# Patient Record
Sex: FEMALE | Race: ASIAN | NOT HISPANIC OR LATINO | Employment: UNEMPLOYED | URBAN - METROPOLITAN AREA
[De-identification: names, ages, dates, MRNs, and addresses within clinical notes are randomized per-mention and may not be internally consistent; named-entity substitution may affect disease eponyms.]

---

## 2019-06-28 ENCOUNTER — TRANSCRIBE ORDERS (OUTPATIENT)
Dept: ADMINISTRATIVE | Age: 29
End: 2019-06-28

## 2019-06-28 ENCOUNTER — APPOINTMENT (OUTPATIENT)
Dept: LAB | Age: 29
End: 2019-06-28
Attending: PREVENTIVE MEDICINE

## 2019-06-28 DIAGNOSIS — Z02.1 PHYSICAL EXAM, PRE-EMPLOYMENT: ICD-10-CM

## 2019-06-28 DIAGNOSIS — Z02.1 PHYSICAL EXAM, PRE-EMPLOYMENT: Primary | ICD-10-CM

## 2019-06-28 PROCEDURE — 36415 COLL VENOUS BLD VENIPUNCTURE: CPT

## 2019-06-28 PROCEDURE — 86787 VARICELLA-ZOSTER ANTIBODY: CPT

## 2019-06-28 PROCEDURE — 86480 TB TEST CELL IMMUN MEASURE: CPT

## 2019-07-01 LAB
GAMMA INTERFERON BACKGROUND BLD IA-ACNC: 0.03 IU/ML
M TB IFN-G BLD-IMP: NEGATIVE
M TB IFN-G CD4+ BCKGRND COR BLD-ACNC: -0.01 IU/ML
M TB IFN-G CD4+ BCKGRND COR BLD-ACNC: 0 IU/ML
MITOGEN IGNF BCKGRD COR BLD-ACNC: >10 IU/ML

## 2019-07-02 LAB — VZV IGG SER IA-ACNC: NORMAL

## 2019-10-24 ENCOUNTER — AMB VIDEO VISIT (OUTPATIENT)
Dept: OTHER | Facility: HOSPITAL | Age: 29
End: 2019-10-24
Payer: COMMERCIAL

## 2019-10-24 PROCEDURE — 99201 PR OFFICE OUTPATIENT NEW 10 MINUTES: CPT | Performed by: FAMILY MEDICINE

## 2019-10-25 NOTE — CARE ANYWHERE EVISITS
Visit Summary for Cesia Funez - Gender: Female - Date of Birth: [id number]  Date: [id number] - Duration: 3 minutes  Patient: Enedelia Padilla Ellakeena  Provider: Sarah Perez    Patient Contact Information  Address  [address]; [place] [address]      Visit Topics  Eyelid inflammation  [Added By: Self - 2019-10-24]    Conversation Transcripts  [0A][0A] [Notification] Naya Dougherty, [employer] Staff, will help you prepare for your visit  She is assisting Sarah Perez Family Physician [0A][Annie Kimmie Badder there, and thank you for connecting  While you are waiting for the doctor, are there any   questions I can answer? If you have questions about billing, insurance or technical issues, please contact customer service  [0A][Notification] Naya Dougherty has left the room  [0A][Notification] Naya Dougherty has left the room  [0A][Notification] You are   connected with Sarah Perez Family Physician [0A][Notification] Cesia Funez is located in [address] [place] [address]  [0A][Notification] Cesia Funez has shared health history  Art Alanis  [0A][Notification] Sarah Perez has added a diagnosis/procedure code (see the   "Visit Notes" tab)  [0A][Notification] Sarah Perez has added a prescription (see the "Visit Notes" tab)  [0A]    Diagnosis  Rash and other nonspecific skin eruption    Procedures    Medications Prescribed    prednisone  Dose : 2 tablets  Route : oral  Frequency : every day  Refills : 0  Instructions to the Pharmacist : Substitutions allowed      Provider Notes  [0A][0A] [0A]We strongly encourage you to share the following record of today's visit with your primary care physician  [0A][0A][0A][0A]Contact phone number[0A][0A]Mode of communication: [place]: For more than a day patient has had irritated   bilateral eyelid with dryness of lids crusting of lashes in the morning, and redness has been going on for 1 year  No visual disturbance, no pain in eye, itching or burning, no trauma or exposure to flying debris   No c/o fever or other URI symptom  Pt   does not wear contacts  [0A][0A][0A][0A]PMH:  None[0A][0A]PSH:  None[0A][0A]Meds:  Sulfa wash[0A][0A]Allergies: NKDA[0A][0A][0A][0A]PE: [0A][0A]Gen: Well appearing, NAD[0A][0A]Eyes: Visually there is no injection of bulbar or palpebral conjunctiva  bilateral upper and lower eyelid redness and dry skin Extraocular movements are intact  There is no discharge noted  [0A][0A][0A][0A]Assessment:  dermatitis eyelids[0A][0A][0A][0A]Plan: [0A][0A]1  Discussed treatment options  I am sending you a   prescription as described below  [0A]PRednisone 20mg 2 tabs daily for 5 days   [0A]2  Discussed precautions including soap and water hand washing   [0A][0A]Follow up:[0A][0A]1  If there are any questions or problems with the prescription, call   [phone] anytime for assistance  [0A][0A]2  Please re-connect for another online visit or see an in-person provider should symptoms worsen or persist for more than 2-3 days  [0A][0A]3  Please print a copy of this note and send it to your regular   doctor, or take it to your next visit so it may be included in your medical record   [0A][0A][0A][0A]Patient voiced understanding and agreement with plan [0A][0A]Please see your PCP on an annual basis   [0A]    Electronically signed by: Naresh Don(NPI [id number])

## 2020-08-03 ENCOUNTER — CLINICAL SUPPORT (OUTPATIENT)
Dept: FAMILY MEDICINE CLINIC | Facility: CLINIC | Age: 30
End: 2020-08-03
Payer: COMMERCIAL

## 2020-08-03 DIAGNOSIS — Z11.1 SCREENING-PULMONARY TB: Primary | ICD-10-CM

## 2020-08-03 PROCEDURE — 86580 TB INTRADERMAL TEST: CPT

## 2020-08-05 LAB
INDURATION: NORMAL MM
TB SKIN TEST: NEGATIVE

## 2020-10-29 ENCOUNTER — TELEMEDICINE (OUTPATIENT)
Dept: FAMILY MEDICINE CLINIC | Facility: CLINIC | Age: 30
End: 2020-10-29
Payer: COMMERCIAL

## 2020-10-29 DIAGNOSIS — E53.8 B12 DEFICIENCY: ICD-10-CM

## 2020-10-29 DIAGNOSIS — R01.1 MURMUR, CARDIAC: ICD-10-CM

## 2020-10-29 DIAGNOSIS — R76.8 POSITIVE ANA (ANTINUCLEAR ANTIBODY): Primary | ICD-10-CM

## 2020-10-29 DIAGNOSIS — R00.2 PALPITATION: ICD-10-CM

## 2020-10-29 DIAGNOSIS — E55.9 VITAMIN D DEFICIENCY: ICD-10-CM

## 2020-10-29 DIAGNOSIS — E61.1 IRON DEFICIENCY: ICD-10-CM

## 2020-10-29 PROCEDURE — 99213 OFFICE O/P EST LOW 20 MIN: CPT | Performed by: FAMILY MEDICINE

## 2020-11-02 ENCOUNTER — TELEPHONE (OUTPATIENT)
Dept: OBGYN CLINIC | Facility: CLINIC | Age: 30
End: 2020-11-02

## 2020-11-03 ENCOUNTER — OFFICE VISIT (OUTPATIENT)
Dept: OBGYN CLINIC | Facility: CLINIC | Age: 30
End: 2020-11-03
Payer: COMMERCIAL

## 2020-11-03 VITALS
DIASTOLIC BLOOD PRESSURE: 72 MMHG | TEMPERATURE: 98.3 F | HEIGHT: 63 IN | WEIGHT: 138 LBS | SYSTOLIC BLOOD PRESSURE: 122 MMHG | BODY MASS INDEX: 24.45 KG/M2

## 2020-11-03 DIAGNOSIS — Z12.4 SCREENING FOR MALIGNANT NEOPLASM OF THE CERVIX: ICD-10-CM

## 2020-11-03 DIAGNOSIS — N91.2 AMENORRHEA: ICD-10-CM

## 2020-11-03 DIAGNOSIS — Z01.419 WOMEN'S ANNUAL ROUTINE GYNECOLOGICAL EXAMINATION: Primary | ICD-10-CM

## 2020-11-03 LAB — SL AMB POCT URINE HCG: POSITIVE

## 2020-11-03 PROCEDURE — 81025 URINE PREGNANCY TEST: CPT | Performed by: OBSTETRICS & GYNECOLOGY

## 2020-11-03 PROCEDURE — G0145 SCR C/V CYTO,THINLAYER,RESCR: HCPCS | Performed by: OBSTETRICS & GYNECOLOGY

## 2020-11-03 PROCEDURE — 87624 HPV HI-RISK TYP POOLED RSLT: CPT | Performed by: OBSTETRICS & GYNECOLOGY

## 2020-11-03 PROCEDURE — 3008F BODY MASS INDEX DOCD: CPT | Performed by: FAMILY MEDICINE

## 2020-11-03 PROCEDURE — S0610 ANNUAL GYNECOLOGICAL EXAMINA: HCPCS | Performed by: OBSTETRICS & GYNECOLOGY

## 2020-11-03 RX ORDER — MULTIVIT-MIN/IRON FUM/FOLIC AC 7.5 MG-4
1 TABLET ORAL DAILY
COMMUNITY
End: 2022-03-09 | Stop reason: ALTCHOICE

## 2020-11-03 RX ORDER — CETIRIZINE HYDROCHLORIDE 10 MG/1
10 TABLET ORAL DAILY
COMMUNITY
End: 2022-03-09 | Stop reason: ALTCHOICE

## 2020-11-03 RX ORDER — CHLORAL HYDRATE 500 MG
1000 CAPSULE ORAL DAILY
COMMUNITY
End: 2022-03-09 | Stop reason: ALTCHOICE

## 2020-11-03 RX ORDER — VALACYCLOVIR HYDROCHLORIDE 500 MG/1
500 TABLET, FILM COATED ORAL AS NEEDED
COMMUNITY
End: 2022-08-03 | Stop reason: SDUPTHER

## 2020-11-04 LAB
HPV HR 12 DNA CVX QL NAA+PROBE: NEGATIVE
HPV16 DNA CVX QL NAA+PROBE: NEGATIVE
HPV18 DNA CVX QL NAA+PROBE: NEGATIVE

## 2020-11-05 ENCOUNTER — TELEPHONE (OUTPATIENT)
Dept: OBGYN CLINIC | Facility: CLINIC | Age: 30
End: 2020-11-05

## 2020-11-05 ENCOUNTER — LAB (OUTPATIENT)
Dept: LAB | Facility: CLINIC | Age: 30
End: 2020-11-05
Payer: COMMERCIAL

## 2020-11-05 DIAGNOSIS — E61.1 IRON DEFICIENCY: ICD-10-CM

## 2020-11-05 DIAGNOSIS — R00.2 PALPITATION: ICD-10-CM

## 2020-11-05 DIAGNOSIS — R76.8 POSITIVE ANA (ANTINUCLEAR ANTIBODY): ICD-10-CM

## 2020-11-05 DIAGNOSIS — N91.2 AMENORRHEA: Primary | ICD-10-CM

## 2020-11-05 DIAGNOSIS — R01.1 MURMUR, CARDIAC: ICD-10-CM

## 2020-11-05 DIAGNOSIS — E55.9 VITAMIN D DEFICIENCY: ICD-10-CM

## 2020-11-05 DIAGNOSIS — E53.8 B12 DEFICIENCY: ICD-10-CM

## 2020-11-05 DIAGNOSIS — Z34.81 ENCOUNTER FOR SUPERVISION OF OTHER NORMAL PREGNANCY, FIRST TRIMESTER: Primary | ICD-10-CM

## 2020-11-05 LAB
25(OH)D3 SERPL-MCNC: 21.5 NG/ML (ref 30–100)
ANION GAP SERPL CALCULATED.3IONS-SCNC: 11 MMOL/L (ref 4–13)
BUN SERPL-MCNC: 9 MG/DL (ref 5–25)
CALCIUM SERPL-MCNC: 9 MG/DL (ref 8.3–10.1)
CHLORIDE SERPL-SCNC: 99 MMOL/L (ref 100–108)
CO2 SERPL-SCNC: 24 MMOL/L (ref 21–32)
CREAT SERPL-MCNC: 0.6 MG/DL (ref 0.6–1.3)
FERRITIN SERPL-MCNC: 15 NG/ML (ref 8–388)
GFR SERPL CREATININE-BSD FRML MDRD: 121 ML/MIN/1.73SQ M
GLUCOSE P FAST SERPL-MCNC: 83 MG/DL (ref 65–99)
MAGNESIUM SERPL-MCNC: 1.9 MG/DL (ref 1.6–2.6)
POTASSIUM SERPL-SCNC: 3.5 MMOL/L (ref 3.5–5.3)
SODIUM SERPL-SCNC: 134 MMOL/L (ref 136–145)
VIT B12 SERPL-MCNC: 458 PG/ML (ref 100–900)

## 2020-11-05 PROCEDURE — 82306 VITAMIN D 25 HYDROXY: CPT

## 2020-11-05 PROCEDURE — 86039 ANTINUCLEAR ANTIBODIES (ANA): CPT

## 2020-11-05 PROCEDURE — 83735 ASSAY OF MAGNESIUM: CPT

## 2020-11-05 PROCEDURE — 82728 ASSAY OF FERRITIN: CPT

## 2020-11-05 PROCEDURE — 80048 BASIC METABOLIC PNL TOTAL CA: CPT

## 2020-11-05 PROCEDURE — 82607 VITAMIN B-12: CPT

## 2020-11-05 PROCEDURE — 36415 COLL VENOUS BLD VENIPUNCTURE: CPT

## 2020-11-05 PROCEDURE — 86038 ANTINUCLEAR ANTIBODIES: CPT

## 2020-11-05 RX ORDER — PNV NO.95/FERROUS FUM/FOLIC AC 28MG-0.8MG
1 TABLET ORAL DAILY
Qty: 30 TABLET | Refills: 12 | Status: CANCELLED | OUTPATIENT
Start: 2020-11-05

## 2020-11-05 RX ORDER — PNV NO.95/FERROUS FUM/FOLIC AC 28MG-0.8MG
1 TABLET ORAL DAILY
Qty: 100 TABLET | Refills: 3 | Status: SHIPPED | OUTPATIENT
Start: 2020-11-05 | End: 2021-11-01 | Stop reason: SDUPTHER

## 2020-11-06 ENCOUNTER — TELEPHONE (OUTPATIENT)
Dept: OBGYN CLINIC | Facility: CLINIC | Age: 30
End: 2020-11-06

## 2020-11-06 DIAGNOSIS — N91.2 AMENORRHEA: ICD-10-CM

## 2020-11-06 LAB
ANA HOMOGEN SER QL IF: NORMAL
ANA HOMOGEN TITR SER: NORMAL {TITER}
RYE IGE QN: POSITIVE

## 2020-11-09 ENCOUNTER — TELEPHONE (OUTPATIENT)
Dept: OBGYN CLINIC | Facility: CLINIC | Age: 30
End: 2020-11-09

## 2020-11-09 LAB
LAB AP GYN PRIMARY INTERPRETATION: NORMAL
LAB AP LMP: NORMAL
Lab: NORMAL

## 2020-11-10 RX ORDER — PNV NO.95/FERROUS FUM/FOLIC AC 28MG-0.8MG
1 TABLET ORAL DAILY
Qty: 100 TABLET | Refills: 3 | Status: CANCELLED | OUTPATIENT
Start: 2020-11-10 | End: 2021-02-18

## 2020-11-10 RX ORDER — PRENATAL VIT 49/IRON FUM/FOLIC 6.75-0.2MG
TABLET ORAL
Status: CANCELLED | OUTPATIENT
Start: 2020-11-10

## 2020-11-17 ENCOUNTER — TELEPHONE (OUTPATIENT)
Dept: OBGYN CLINIC | Facility: CLINIC | Age: 30
End: 2020-11-17

## 2020-11-17 DIAGNOSIS — O21.9 NAUSEA/VOMITING IN PREGNANCY: Primary | ICD-10-CM

## 2020-11-17 RX ORDER — DOXYLAMINE SUCCINATE AND PYRIDOXINE HYDROCHLORIDE, DELAYED RELEASE TABLETS 10 MG/10 MG 10; 10 MG/1; MG/1
2 TABLET, DELAYED RELEASE ORAL
Qty: 100 TABLET | Refills: 1 | Status: SHIPPED | OUTPATIENT
Start: 2020-11-17 | End: 2021-01-04 | Stop reason: ALTCHOICE

## 2020-11-18 ENCOUNTER — TELEPHONE (OUTPATIENT)
Dept: OBGYN CLINIC | Facility: CLINIC | Age: 30
End: 2020-11-18

## 2020-11-18 DIAGNOSIS — O21.9 NAUSEA/VOMITING IN PREGNANCY: Primary | ICD-10-CM

## 2020-11-18 RX ORDER — METOCLOPRAMIDE 10 MG/1
10 TABLET ORAL 3 TIMES DAILY
Qty: 30 TABLET | Refills: 1 | Status: SHIPPED | OUTPATIENT
Start: 2020-11-18 | End: 2020-12-10

## 2020-11-19 ENCOUNTER — HOSPITAL ENCOUNTER (OUTPATIENT)
Dept: ULTRASOUND IMAGING | Facility: HOSPITAL | Age: 30
Discharge: HOME | End: 2020-11-19
Attending: OBSTETRICS & GYNECOLOGY
Payer: COMMERCIAL

## 2020-11-19 DIAGNOSIS — N91.2 AMENORRHEA: ICD-10-CM

## 2020-11-19 PROCEDURE — 76801 OB US < 14 WKS SINGLE FETUS: CPT

## 2020-11-23 ENCOUNTER — TELEPHONE (OUTPATIENT)
Dept: OBGYN CLINIC | Facility: CLINIC | Age: 30
End: 2020-11-23

## 2020-11-23 DIAGNOSIS — O20.0 THREATENED ABORTION: Primary | ICD-10-CM

## 2020-11-25 ENCOUNTER — LAB (OUTPATIENT)
Dept: LAB | Facility: CLINIC | Age: 30
End: 2020-11-25
Payer: COMMERCIAL

## 2020-11-25 DIAGNOSIS — O20.0 THREATENED ABORTION: ICD-10-CM

## 2020-11-25 LAB
B-HCG SERPL-ACNC: ABNORMAL MIU/ML
PROGEST SERPL-MCNC: 16.2 NG/ML

## 2020-11-25 PROCEDURE — 84144 ASSAY OF PROGESTERONE: CPT

## 2020-11-25 PROCEDURE — 36415 COLL VENOUS BLD VENIPUNCTURE: CPT

## 2020-11-25 PROCEDURE — 84702 CHORIONIC GONADOTROPIN TEST: CPT

## 2020-12-10 DIAGNOSIS — O21.9 NAUSEA/VOMITING IN PREGNANCY: ICD-10-CM

## 2020-12-10 RX ORDER — METOCLOPRAMIDE 10 MG/1
TABLET ORAL
Qty: 30 TABLET | Refills: 1 | Status: SHIPPED | OUTPATIENT
Start: 2020-12-10 | End: 2020-12-11 | Stop reason: SDUPTHER

## 2020-12-11 DIAGNOSIS — O21.9 NAUSEA/VOMITING IN PREGNANCY: ICD-10-CM

## 2020-12-12 RX ORDER — METOCLOPRAMIDE 10 MG/1
5 TABLET ORAL 3 TIMES DAILY
Qty: 30 TABLET | Refills: 1 | Status: SHIPPED | OUTPATIENT
Start: 2020-12-12 | End: 2020-12-27

## 2020-12-16 ENCOUNTER — TELEPHONE (OUTPATIENT)
Dept: OBGYN CLINIC | Facility: CLINIC | Age: 30
End: 2020-12-16

## 2020-12-16 ENCOUNTER — HOSPITAL ENCOUNTER (OUTPATIENT)
Dept: ULTRASOUND IMAGING | Facility: HOSPITAL | Age: 30
Discharge: HOME | End: 2020-12-16
Attending: OBSTETRICS & GYNECOLOGY
Payer: COMMERCIAL

## 2020-12-16 ENCOUNTER — DOCUMENTATION (OUTPATIENT)
Dept: OTHER | Facility: HOSPITAL | Age: 30
End: 2020-12-16

## 2020-12-16 DIAGNOSIS — O20.0 THREATENED ABORTION: ICD-10-CM

## 2020-12-16 PROCEDURE — 76817 TRANSVAGINAL US OBSTETRIC: CPT

## 2020-12-16 PROCEDURE — 76816 OB US FOLLOW-UP PER FETUS: CPT

## 2020-12-16 NOTE — TELEPHONE ENCOUNTER
Patient called and is wondering if you still want her to go get blood work done for her blood type even though she does not want to get a d & c done

## 2020-12-16 NOTE — TELEPHONE ENCOUNTER
Spoke with the patient results discussed option given for D&C versus repeat ultrasound in 10 days if still confirm nonviable pregnancy then to return to office to discuss option possible medical or surgical management for her missed AB to have an ultrasound repeated December 28 to confirm status of the pregnancy and to be seen at 1:00 p m   To discuss management option all patient questions answer

## 2020-12-16 NOTE — TELEPHONE ENCOUNTER
Patient called stating that she reviewed her ultrasound and noticed that she does not have a viable pregnancy  She stated it has been around 10 weeks since she had her period, and was not sure what she needs to do  Asked to be called back to discuss

## 2020-12-17 ENCOUNTER — NURSE TRIAGE (OUTPATIENT)
Dept: OTHER | Facility: OTHER | Age: 30
End: 2020-12-17

## 2020-12-18 ENCOUNTER — APPOINTMENT (OUTPATIENT)
Dept: LAB | Facility: CLINIC | Age: 30
End: 2020-12-18
Payer: COMMERCIAL

## 2020-12-18 DIAGNOSIS — O03.9 MISCARRIAGE: Primary | ICD-10-CM

## 2020-12-18 DIAGNOSIS — O03.9 MISCARRIAGE: ICD-10-CM

## 2020-12-18 LAB
ABO GROUP BLD: NORMAL
B-HCG SERPL-ACNC: 9159 MIU/ML
BLD GP AB SCN SERPL QL: NEGATIVE
RH BLD: POSITIVE
SPECIMEN EXPIRATION DATE: NORMAL

## 2020-12-18 PROCEDURE — 86850 RBC ANTIBODY SCREEN: CPT

## 2020-12-18 PROCEDURE — 84702 CHORIONIC GONADOTROPIN TEST: CPT

## 2020-12-18 PROCEDURE — 86901 BLOOD TYPING SEROLOGIC RH(D): CPT

## 2020-12-18 PROCEDURE — 36415 COLL VENOUS BLD VENIPUNCTURE: CPT

## 2020-12-18 PROCEDURE — 86900 BLOOD TYPING SEROLOGIC ABO: CPT

## 2020-12-18 NOTE — TELEPHONE ENCOUNTER
Spoke with pt and order put in for type and screen  She will try and go today to have done  She states she is only having spotting at this time  Advised to call if any further question or concerns

## 2020-12-21 ENCOUNTER — OFFICE VISIT (OUTPATIENT)
Dept: OBGYN CLINIC | Facility: CLINIC | Age: 30
End: 2020-12-21
Payer: COMMERCIAL

## 2020-12-21 VITALS
WEIGHT: 138.4 LBS | BODY MASS INDEX: 24.52 KG/M2 | SYSTOLIC BLOOD PRESSURE: 120 MMHG | DIASTOLIC BLOOD PRESSURE: 76 MMHG | HEIGHT: 63 IN

## 2020-12-21 DIAGNOSIS — O03.4 INCOMPLETE MISCARRIAGE: Primary | ICD-10-CM

## 2020-12-21 PROCEDURE — 3008F BODY MASS INDEX DOCD: CPT | Performed by: OBSTETRICS & GYNECOLOGY

## 2020-12-21 PROCEDURE — 1036F TOBACCO NON-USER: CPT | Performed by: OBSTETRICS & GYNECOLOGY

## 2020-12-21 PROCEDURE — 99213 OFFICE O/P EST LOW 20 MIN: CPT | Performed by: OBSTETRICS & GYNECOLOGY

## 2020-12-21 RX ORDER — MISOPROSTOL 200 UG/1
TABLET ORAL
Qty: 4 TABLET | Refills: 0 | Status: SHIPPED | OUTPATIENT
Start: 2020-12-21 | End: 2021-01-04 | Stop reason: ALTCHOICE

## 2020-12-30 ENCOUNTER — TRANSCRIBE ORDERS (OUTPATIENT)
Dept: LAB | Facility: CLINIC | Age: 30
End: 2020-12-30

## 2020-12-30 ENCOUNTER — HOSPITAL ENCOUNTER (OUTPATIENT)
Dept: NON INVASIVE DIAGNOSTICS | Facility: CLINIC | Age: 30
Discharge: HOME | End: 2020-12-30
Payer: COMMERCIAL

## 2020-12-30 DIAGNOSIS — R01.1 MURMUR, CARDIAC: ICD-10-CM

## 2020-12-30 DIAGNOSIS — O03.4 INCOMPLETE MISCARRIAGE: Primary | ICD-10-CM

## 2020-12-30 PROCEDURE — 93306 TTE W/DOPPLER COMPLETE: CPT

## 2020-12-30 PROCEDURE — 93306 TTE W/DOPPLER COMPLETE: CPT | Performed by: INTERNAL MEDICINE

## 2021-01-04 ENCOUNTER — OFFICE VISIT (OUTPATIENT)
Dept: OBGYN CLINIC | Facility: CLINIC | Age: 31
End: 2021-01-04
Payer: COMMERCIAL

## 2021-01-04 VITALS
WEIGHT: 142 LBS | DIASTOLIC BLOOD PRESSURE: 76 MMHG | BODY MASS INDEX: 25.16 KG/M2 | TEMPERATURE: 97.9 F | SYSTOLIC BLOOD PRESSURE: 122 MMHG | HEIGHT: 63 IN

## 2021-01-04 DIAGNOSIS — O03.9 COMPLETE MISCARRIAGE: Primary | ICD-10-CM

## 2021-01-04 PROCEDURE — 99213 OFFICE O/P EST LOW 20 MIN: CPT | Performed by: OBSTETRICS & GYNECOLOGY

## 2021-01-04 RX ORDER — PYRIDOXINE HCL (VITAMIN B6) 50 MG
50 TABLET ORAL DAILY
COMMUNITY
End: 2022-03-09 | Stop reason: ALTCHOICE

## 2021-01-04 NOTE — PROGRESS NOTES
Assessment/Plan:     Diagnoses and all orders for this visit:    Complete miscarriage    Other orders  -     VITAMIN D PO; Take by mouth  -     vitamin B-12 (CYANOCOBALAMIN) 100 MCG TABS; Take 50 mcg by mouth daily  -     Iron-Vitamin C (IRON 100/C PO); Take by mouth       57-year-old female  complete miscarriage  Acne/eczema  Vegetarian  Blood type  positive  Plan  Desired natural planning for contraception  To continue prenatal vitamin daily  Return to office for annual exam    Subjective:      Patient ID: Todd Castro is a 26 y o  female      HPI  57-year-old female presents to the office today follow-up on miscarriage  Patient was seen December 21 given misoprostol desire medical miscarriage patient has spontaneous miscarriage and passed on her own product of conception December 22nd  Started to have heavy bleeding passing clots then her bleeding slowed down denies any active bleeding currently denies any pelvic pain denies any nausea vomiting diarrhea or constipation denies any urgency frequency or dysuria blood type is positive  Bedside ultrasound performed confirm passing products of conception  Speculum apply cervix closed long posterior no active bleeding noted  Option given for contraception different contraception option explained and discussed with patient in details with risk benefit side effect patient is considering possible pregnancy in the next 3 months consider natural planning for contraception and to continue prenatal vitamin daily all questions answered and patient was satisfied  The following portions of the patient's history were reviewed and updated as appropriate: allergies, current medications, past family history, past medical history, past social history, past surgical history and problem list     Review of Systems      Objective:      /76 (BP Location: Left arm, Patient Position: Sitting, Cuff Size: Adult)   Temp 97 9 °F (36 6 °C) (Temporal)   Ht 5' 3" (1 6 m)   Wt 64 4 kg (142 lb)   BMI 25 15 kg/m²          Physical Exam  Vitals signs and nursing note reviewed  Constitutional:       Appearance: Normal appearance  She is well-developed  Abdominal:      Palpations: Abdomen is soft  Hernia: No hernia is present  Genitourinary:     Vagina: Normal  No vaginal discharge, erythema, tenderness or bleeding  Cervix: No cervical motion tenderness, discharge or friability  Uterus: Not enlarged and not tender  Adnexa:         Right: No mass or tenderness  Left: No mass or tenderness  Neurological:      Mental Status: She is alert and oriented to person, place, and time

## 2021-01-25 ENCOUNTER — OFFICE VISIT (OUTPATIENT)
Dept: FAMILY MEDICINE CLINIC | Facility: CLINIC | Age: 31
End: 2021-01-25
Payer: COMMERCIAL

## 2021-01-25 ENCOUNTER — OFFICE VISIT (OUTPATIENT)
Dept: DERMATOLOGY | Facility: CLINIC | Age: 31
End: 2021-01-25
Payer: COMMERCIAL

## 2021-01-25 VITALS
TEMPERATURE: 98.8 F | HEART RATE: 82 BPM | WEIGHT: 143 LBS | OXYGEN SATURATION: 99 % | BODY MASS INDEX: 25.34 KG/M2 | DIASTOLIC BLOOD PRESSURE: 82 MMHG | RESPIRATION RATE: 16 BRPM | SYSTOLIC BLOOD PRESSURE: 126 MMHG | HEIGHT: 63 IN

## 2021-01-25 VITALS
SYSTOLIC BLOOD PRESSURE: 112 MMHG | TEMPERATURE: 96.8 F | WEIGHT: 141.9 LBS | DIASTOLIC BLOOD PRESSURE: 80 MMHG | BODY MASS INDEX: 25.14 KG/M2 | HEIGHT: 63 IN

## 2021-01-25 DIAGNOSIS — L70.0 ACNE CYSTICA: Primary | ICD-10-CM

## 2021-01-25 DIAGNOSIS — R35.0 URINARY FREQUENCY: Primary | ICD-10-CM

## 2021-01-25 LAB
SL AMB  POCT GLUCOSE, UA: NORMAL
SL AMB LEUKOCYTE ESTERASE,UA: NORMAL
SL AMB POCT BILIRUBIN,UA: NORMAL
SL AMB POCT BLOOD,UA: NORMAL
SL AMB POCT CLARITY,UA: CLEAR
SL AMB POCT COLOR,UA: YELLOW
SL AMB POCT KETONES,UA: NORMAL
SL AMB POCT NITRITE,UA: NORMAL
SL AMB POCT PH,UA: 8
SL AMB POCT SPECIFIC GRAVITY,UA: 1.01
SL AMB POCT URINE HCG: NEGATIVE
SL AMB POCT URINE PROTEIN: NORMAL
SL AMB POCT UROBILINOGEN: NORMAL

## 2021-01-25 PROCEDURE — 81025 URINE PREGNANCY TEST: CPT | Performed by: DERMATOLOGY

## 2021-01-25 PROCEDURE — 3008F BODY MASS INDEX DOCD: CPT | Performed by: NURSE PRACTITIONER

## 2021-01-25 PROCEDURE — 81003 URINALYSIS AUTO W/O SCOPE: CPT | Performed by: NURSE PRACTITIONER

## 2021-01-25 PROCEDURE — 1036F TOBACCO NON-USER: CPT | Performed by: NURSE PRACTITIONER

## 2021-01-25 PROCEDURE — 99204 OFFICE O/P NEW MOD 45 MIN: CPT | Performed by: DERMATOLOGY

## 2021-01-25 PROCEDURE — 99213 OFFICE O/P EST LOW 20 MIN: CPT | Performed by: NURSE PRACTITIONER

## 2021-01-25 RX ORDER — METOCLOPRAMIDE 10 MG/1
TABLET ORAL
COMMUNITY
Start: 2020-11-18 | End: 2022-01-17 | Stop reason: SDUPTHER

## 2021-01-25 RX ORDER — SPIRONOLACTONE 25 MG/1
25 TABLET ORAL DAILY
Qty: 30 TABLET | Refills: 2 | Status: SHIPPED | OUTPATIENT
Start: 2021-01-25 | End: 2022-02-18

## 2021-01-25 NOTE — PROGRESS NOTES
Assessment/Plan:      Diagnoses and all orders for this visit:    Urinary frequency  -     POCT urine dip  -     TSH, 3rd generation with Free T4 reflex; Future      Patient reports increased urinary frequency and urgency for the past 4 days  Denies any fever, dysuria, hematuria, pelvic pain, or lower back pain  Urinalysis done and reviewed in the office and was normal    Patient had a urine HCG done this am at the dermatologist and it was negative  Dermatologist ordered a CBC and CMP  TSH ordered  Will follow-up results with the patient  Patient instructed to follow-up sooner if symptoms get worse or do not get better  Subjective:     Patient ID: Watson Guo is a 33 y o  female  Patient reports increased urinary frequency and urgency for the past 4 days  Denies any fever, dysuria, hematuria, pelvic pain, or lower back pain  Patient denies taking anything OTC for her symptoms  Patient reports that her symptoms are not going away  Review of Systems   Constitutional: Negative for chills and fever  HENT: Negative for congestion, ear pain and sore throat  Respiratory: Negative for cough, chest tightness and shortness of breath  Cardiovascular: Negative for chest pain  Gastrointestinal: Negative for abdominal pain, diarrhea, nausea and vomiting  Genitourinary:        As noted in HPI  Skin: Negative for rash  Neurological: Negative for dizziness, syncope, light-headedness and headaches  Objective:     Physical Exam  Vitals signs reviewed  Constitutional:       General: She is not in acute distress  Appearance: She is not diaphoretic  Cardiovascular:      Rate and Rhythm: Normal rate and regular rhythm  Pulses: Normal pulses  Heart sounds: Normal heart sounds  Pulmonary:      Effort: Pulmonary effort is normal  No respiratory distress  Breath sounds: Normal breath sounds  No wheezing  Abdominal:      General: There is no distension  Palpations: Abdomen is soft  There is no mass  Tenderness: There is no abdominal tenderness  Comments: No flank tenderness noted on palpation bilaterally  Musculoskeletal:      Comments: Gait wnl  Skin:     Findings: No rash  Neurological:      Mental Status: She is alert and oriented to person, place, and time     Psychiatric:         Mood and Affect: Mood normal

## 2021-01-25 NOTE — PROGRESS NOTES
[facility] Note     Patient Name: Verner Leech  Encounter Date: 1/25/2021     Have you been cared for by a [facility] Dermatologist in the last 3 years and, if so, which one? No    · Have you traveled outside of the [address] [place] [address] in the past 3 months or outside of the [facility] area in the last 2 weeks? No     May we call your Preferred Phone number to discuss your specific medical information? Yes     May we leave a detailed message that includes your specific medical information? Yes      Today's Chief Concerns:   Concern #1: Acne    Past Medical History:  Have you personally ever had or currently have any of the following? · Skin cancer (such as Melanoma, Basal Cell Carcinoma, Squamous Cell Carcinoma? (If Yes, please provide more detail)- No  · Eczema: YES  · Psoriasis: No  · HIV/AIDS: No  · Hepatitis B or C: No  · Tuberculosis: No  · Systemic Immunosuppression such as Diabetes, Biologic or Immunotherapy, Chemotherapy, Organ Transplantation, Bone Marrow Transplantation (If YES, please provide more detail): No  · Radiation Treatment (If YES, please provide more detail): No  · Any other major medical conditions/concerns? (If Yes, which types)- No    Social History:     What is/was your primary occupation? student     What are your hobbies/past-times? denies    Family History:  Have any of your "first degree relatives" (parent, brother, sister, or child) had any of the following       · Skin cancer such as Melanoma or Merkel Cell Carcinoma or Pancreatic Cancer? No  · Eczema, Asthma, Hay Fever or Seasonal Allergies: YES, eczema: father, allergies: sister  · Psoriasis or Psoriatic Arthritis: No  · Do any other medical conditions seem to run in your family? If Yes, what condition and which relatives?   YES, hypertension: father    Current Medications:       Current Outpatient Medications:     cetirizine (ZyrTEC) 10 mg tablet, Take 10 mg by mouth daily, Disp: , Rfl:     Iron-Vitamin C (IRON 100/C PO), Take by mouth, Disp: , Rfl:     Multiple Vitamins-Minerals (multivitamin with minerals) tablet, Take 1 tablet by mouth daily, Disp: , Rfl:     Omega-3 Fatty Acids (fish oil) 1,000 mg, Take 1,000 mg by mouth daily, Disp: , Rfl:     Prenatal Vit-Fe Fumarate-FA (prenatal vitamin) 28-0 8 mg, Take 1 tablet by mouth daily, Disp: 100 tablet, Rfl: 3    valACYclovir (VALTREX) 500 mg tablet, Take 500 mg by mouth 2 (two) times a day, Disp: , Rfl:     vitamin B-12 (CYANOCOBALAMIN) 100 MCG TABS, Take 50 mcg by mouth daily, Disp: , Rfl:     VITAMIN D PO, Take by mouth, Disp: , Rfl:       Review of Systems:  Have you recently had or currently have any of the following? If YES, what are you doing for the problem? · Fever, chills or unintended weight loss: No  · Sudden loss or change in your vision: No  · Nausea, vomiting or blood in your stool: No  · Painful or swollen joints: No  · Wheezing or cough: No  · Changing mole or non-healing wound: No  · Nosebleeds: No  · Excessive sweating: No  · Easy or prolonged bleeding? No  · Over the last 2 weeks, how often have you been bothered by the following problems? · Taking little interest or pleasure in doing things: 1 - Not at All  · Feeling down, depressed, or hopeless: 1 - Not at All  · Rapid heartbeat with epinephrine:  No    · FEMALES ONLY:    · Are you pregnant or planning to become pregnant? No  · Are you currently or planning to be nursing or breast feeding? No    · Any known allergies? · No Known Allergies      Physical Exam:     Was a chaperone (Derm Clinical Assistant) present throughout the entire Physical Exam? Yes     Did the Dermatology Team specifically  the patient on the importance of a Full Skin Exam to be sure that nothing is missed clinically?  Yes}  o Did the patient ultimately request or accept a Full Skin Exam?  NO  o Did the patient specifically refuse to have the areas "under-the-bra" examined by the Dermatologist? Charlene galan Did the patient specifically refuse to have the areas "under-the-underwear" examined by the Dermatologist? YES    CONSTITUTIONAL:   Vitals:    01/25/21 0930   Temp: (!) 96 8 °F (36 °C)   TempSrc: Temporal   Weight: 64 4 kg (141 lb 14 4 oz)   Height: 5' 3" (1 6 m)       PSYCH: Normal mood and affect  EYES: Normal conjunctiva  ENT: Normal lips and oral mucosa  CARDIOVASCULAR: No edema  RESPIRATORY: Normal respirations  HEME/LYMPH/IMMUNO:  No regional lymphadenopathy except as noted below in "ASSESSMENT AND PLAN BY DIAGNOSIS"    SKIN:  FULL ORGAN SYSTEM EXAM  Hair, Scalp, Ears, Face Normal except as noted below in Assessment   Neck Normal except as noted below in Assessment   Right Arm/Hand/Fingers Normal except as noted below in Assessment   Left Arm/Hand/Fingers Normal except as noted below in Assessment   Groin/Genitalia/Buttocks NOT EXAMINED   Right Leg Normal except as noted below in Assessment   Left Leg Normal except as noted below in Assessment        Assessment and Plan by Diagnosis:    History of Present Condition:     Duration:  How long has this been an issue for you?    o  10 years   Location Affected:  Where on the body is this affecting you?    o  face   Quality:  Is there any bleeding, pain, itch, burning/irritation, or redness associated with the skin lesion? o  denies   Severity:  Describe any bleeding, pain, itch, burning/irritation, or redness on a scale of 1 to 10 (with 10 being the worst)  o  1   Timing:  Does this condition seem to be there pretty constantly or do you notice it more at specific times throughout the day? o  constant   Context:  Have you ever noticed that this condition seems to be associated with specific activities you do?    o  menstrual cycle   Modifying Factors:    o Anything that seems to make the condition worse?    -  denies  o What have you tried to do to make the condition better?     -  IPL laser treatment   Associated Signs and Symptoms:  Does this skin lesion seem to be associated with any of the following:  o  SL AMB DERM SIGNS AND SYMPTOMS: Redness and Skin color changes     1  ACNE VULGARIS ("COMMON ACNE")    Physical Exam:   Psychiatric/Mood: normal   Anatomic Location Affected:  face   Morphological Description:  o Open/Closed Comedones:  - Many ("Severe")  o Inflammatory Papules/Pustules:  - Many ("Severe")  o Nodules:  - Many ("Severe")  o Scarring:  - Several ("Moderate")  o Excoriations:  - Few ("Mild")  o Local Skin Redness/Erythema:  - Several ("Moderate")  o Local Skin Dryness/Scaling:  - Few ("Mild")  o Local Skin Dyspigmentation:  - Several ("Moderate")   Pertinent Positives:   Pertinent Negatives: Additional History of Present Condition:  Patient states she has been struggling with acne for about 10 years  She has tried various face washes and topicals on the skin with minimal success  Patient has had IPL treatments in 2012 which have helped  Chemical peels do not seem to help her skin  Patient has tried topical clindamycin and an oral antibiotic  She has also tried getting facials and dermabrasions done as well as topical retinoids and spironolactone  Assessment and Plan:   We reviewed the causes of acne, the kinds of acne, and the expected clinical course   We discussed treatment options ranging from over-the-counter products, topical retinoids, antibiotics, BP, hormonal therapies (OCPs/spironolactone), and isotretinoin (Accutane)   We reviewed specific over-the-counter interventions and medications  Recommended typical hygiene measures including water-based facial products, washing regularly with mild cleanser, and refraining from picking and popping any pimples   Recommended non-comedogenic sunscreen use daily   Expectations of therapy discussed  Side effects, risks and benefits of medications discussed   A comprehensive handout on Acne was provided     The phone number to call in case of questions or concerns (and instructions to stop medications in such a scenario) was provided   After lengthy discussion of etiology and treatment options, we decided to implement the following personalized treatment plan:    Based on a thorough discussion of this condition and the management approach to it (including a comprehensive discussion of the known risks, side effects and potential benefits of treatment), the patient (family) agrees to implement the following specific plan:    --------------------------------------------------------------------------------------  YOUR PERSONALIZED ACNE ACTION PLAN    1) ORAL CONTRACEPTIVE PILL:   Screening Questions:  o Do you smoke? No  o Are you pregnant, or could be pregnant, or trying to become pregnant? No  o Do you have a personal history of breast cancer? No  o Do you have any artificial hardware or implants? No  o Do you have a condition called Factor 5 Leiden deficiency? No  o Do you have any known family history of clotting problems including Factor 5 Leiden deficiency or pulmonary embolus or deep vein thrombosus? No  o Do you have regularly have migraine headaches specifically with aura or due to flashing lights? No  o Do you have any vaginal bleeding other than that associated with your menstrual cycle? No   Spironolactone 25 mg   Females:  o Point of care urine pregnancy test:  Done TODAY  - Results: Negative  o Blood pressure checked:    - Done TODAY; Results: 112/80    2) ORAL ISOTRETINOIN:     Isotretinoin 10 mg daily, 20 mg daily, 40 mg daily, 60 mg daily, 80 mg daily   Labs reviewed   o Reviewed TODAY  - Within normal limits and reviewed with patient  - Abnormal   Females:  o Point of care urine pregnancy test:  Done TODAY  - Results: Negative    Patient confirmed in [employer]:  #[id number]  o Done TODAY  - 2 forms of contraception:  Oral contraceptives and male condom      ACNE:  WHAT ZIT ALL ABOUT? WHY DO I HAVE ACNE/PIMPLES?   Your skin is made of layers  To keep the skin from becoming dry and cracked, the skin needs oil  The oil is made in little wells in the deeper layers in the skin  People with acne have glands that make more oil and are more easily plugged, causing the glands to swell  Hormones, bacteria and your inherited tendency to have acne all play a role  The medical term for pimples is acne or acne vulgaris (vulgaris means common)  Most people get some acne  Acne does not come from being dirty  Instead, it is an expected consequence of changes that occur during normal growth and development  Hormones, bacteria, and your family's tendency to have acne may all play a role  Whiteheads or blackheads are openings of the glands (glands are the oil factories) onto the surface of the skin  Blackheads are not caused by dirt blocking the pores; instead, they result from the oxidation reaction of oil and skin in the pores with the air (like a rust reaction)  WHAT ABOUT STRESS? Stress does not cause acne but it can make it worse  Make sure you get enough sleep and daily exercise! WHAT ABOUT FOODS/DIET? Try to eat a balanced, healthy diet  Some people feel that certain foods worsen their acne  While there aren't many studies available on this question, severe dietary changes are unlikely to help your acne and may be harmful to the health of your skin  If you find that a certain food seems to aggravate your acne, you may consider avoiding that food  Discuss this with your physician! WHAT CAUSES MY ACNE? There are four contributors to acne--the body's natural oil (sebum), clogged pores, bacteria (with the scientific name Propionibacterium acnes, or P  acnes, for short), and the body's reaction to the bacteria living in the clogged pores (which causes inflammation)   Here's what happens:     Sebum is produced in the normal oil-making glands in the deeper layers of the skin and reaches the surface through the skin's pores  An increase in certain hormones occurs around the time of puberty, and these hormones trigger the oil glands to produce increased amounts of sebum   Pores with excess oil tend to become clogged more easily   At the same time, P  acnes--one of the many types of bacteria that normally live on everyone's skin--thrives in the excess oil and causes a skin reaction (inflammation)   If a pore is clogged close to the surface, there is little inflammation  However, this results in the formation of whiteheads (closed comedones) or blackheads (open comedones) at the surface of the skin   A plug that extends to, or forms a little deeper in the pore, or one that enlarges or ruptures may cause more inflammation  The result is red bumps (papules) and pus-filled pimples (pustules)   If plugging happens in the deepest skin layer, the inflammation may be even more severe, resulting in the formation of nodules or cysts  When these types of acne heal, they may leave behind discolored areas or true scars  SKIN HYGIENE:  HOW SHOULD I [address] MY SKIN? Acne does not come from being dirty, however, washing your face is part of taking good care of your skin and will help keep your face clear  Good skin hygiene is, therefore, critical to support any acne treatment plan  Here are several specific suggestions for practicing good skin hygiene and keeping your skin looking its best:     You should wash acne-prone skin TWICE A DAY: Once in the morning and once in the evening  This does include any showers you take that day, so do not overdo it!  Do not scrub the skin with a washcloth or loofah as these can irritate and inflame your acne  Acne does not come from dirt, so it is not necessary to scrub the skin clean  In fact, scrubbing may lead to dryness and irritation that makes the acne even worse and harder for patients to tolerate acne medications      Use a gentle facial moisturizing cleanser (Cetaphil Moisturizing Cleanser or Dove Fragrance-Free bar)  Avoid using soaps like Kaykay Gay, [address], [address], or soft/liquid soaps as these products will dry your skin   Do not use any over-the-counter acne washes without your doctor's specific instruction to do so  These products often contain salicylic acid or benzoyl peroxide  These ingredients can be helpful in clearing oil from the skin and reducing bacteria, but they may also be drying and can add to irritation   Do not use exfoliating products with microbeads or brushes as these can cause irritation to the skin   Facials and other treatments to remove, squeeze, or clean out pores are not recommended  Manipulating the skin in this way can make acne worse and can lead to severe infections and/or scarring  It also increases the likelihood that the skin will not be able to tolerate acne medications   Try not to pop pimples or pick at your acne as this can delay healing and may result in scarring or skin color changes (dark spots) that are often more noticeable than the acne itself  Picking/popping acne can also cause a serious skin infection   Wash or change your pillow case once to twice a week, especially if you use products in your hair   Wash the skin as soon as possible after playing sports or other activities that cause a lot of sweating  Also, pay attention to how your sports equipment (shoulder pads, helmet strap, etc ) might be making your acne worse   When you use makeup, moisturizer, or sunscreen make sure that these products are labeled non-comedogenic, or won't clog pores, or won't cause acne         SHOULD I TREAT MY ACNE? There are a number of other skin conditions that can look like acne   If there is any question about the diagnosis, then the person should be evaluated by a board certified pediatric and adolescent dermatologist   A physician should examine any child with acne who is between the ages of 3and 9years of age, as acne in this mid-childhood age group is not normal and may signal an underlying problem  If a preadolescent (9to 6years of age) or adolescent (15to 25years of age) has mild acne and the condition is not bothersome to the individual, proper and regular skin care (what your doctor may call skin hygiene) may be all that is needed at this point  Many people do, however, need specific acne medications to help their skin look and feel its best  Your doctor will tell you if you are one of these people  If so, you may be advised to use an over-the-counter or prescription medication that is applied to the skin (a topical medication) or if the addition of an oral medication (a medication taken by [employer]) is needed  The good news is that the medications work well when used properly! Some specific factors that may influence the choice of acne therapy include:     Severity  The number and type of skin lesions (papules or comedones) and the degree of inflammation (mild, moderate or severe)   Scarring  Scarring is most common when acne is severe, but it can happen even in children with mild acne   Impact  If a child is experiencing emotional complications because of the acne or is experiencing negative comments from other children   Cost of the acne medications  An acne expert can help to keep out of pocket costs to a minimum by utilizing the correct medications and the least expensive options   The patient's skin type (oily versus dry or combination skin, for example)   Potential side effects of the medication   The ease or overall complexity of the treatment plan or medication  WHAT ACNE TREATMENTS ARE AVAILABLE? Medications for acne try to stop the formation of new pimples by reducing or removing the oil, bacteria, and other things (like dead skin cells) that clog the pores  They can also decrease the inflammation or irritation response of the skin to bacteria   It may take from 6 to 8 weeks (about 2 months!) before you see any improvement and know if the medication is effective  It takes the layers of skin this long to regenerate  Remember, these medications do not cure the condition--the acne improves because of the medication  Therefore, treatment must be continued in order to prevent the return of acne lesions  There are many types of acne treatments  Some are applied to the skin (topical medications) and some are taken by mouth (oral medications)  In most cases of mild acne, the doctor will start with a topical medication  There are many different topical medications that are helpful for acne  If acne is more severe and it does not respond adequately to a topical medication, or if it covers large body surface areas such as the back and/or chest, oral antibiotics such as Doxycycline or Minocycline and/or oral hormone therapy such as Oral Contraceptive Pills or Spironolactone may be prescribed  In the most severe cases, isotretinoin (Accutane) may be used  In general, it is usually best to start with acne medications that are least likely to cause side effects but are at the same time capable of addressing the specific causes for the acne  Some patients have a good result with just one medication, but many will need to use a combination of treatments: two or more different topical agents or an oral medication plus a topical medication  Another treatment used for acne may include corticosteroid injections, which are used to help relieve pain, decrease the size, and encourage the healing of large, inflamed acne nodules  Also, dermatologists sometimes perform acne surgery, using a fine needle, a pointed blade, or an instrument known as a comedone extractor to mechanically clean out clogged pores  One must always weigh the risk for inducing a scar with the potential benefits of any procedure   Prior treatment with topical retinoids can loosen whiteheads and blackheads and make it easier to physically remove such lesions  Heat-based devices, and light and laser therapy are being studied to see whether there is any role for such treatments in mild to moderate acne  At this time, there is not enough evidence to make general recommendations about their use  TOPICAL ACNE MEDICATIONS    WHAT KIND OF TOPICALS ARE THERE?  Benzoyl peroxide (BP) helps to fight inflammation and is anti-microbial (kills bacteria, viruses, and other microorganisms) and is believed to help prevent resistance of bacteria to topical antibiotics  A benzoyl peroxide wash may be recommended for use on large areas such as the chest and/or back  Mild irritation and dryness are common when first using benzoyl peroxide-containing products  Be careful because benzoyl peroxide can bleach towels and clothing!  Retinoids (such as adapalene, tretinoin, or tazarotene) unplug the oil glands by helping peel away the layers of skin and other things plugging the opening of the glands  Mild irritation and dryness are common when first using these products  Facial waxing and other skin procedures can lead to excessive irritation and should be avoided during retinoid therapy   Antibiotics fight bacteria and help decrease inflammation  Topical antibiotics commonly used in acne include clindamycin, erythromycin, and combination agents (such as clindamycin/benzoyl peroxide or erythromycin/benzoyl peroxide)  Mild irritation and dryness are common when first using these products  Typically, topical antibiotics should not be used alone as treatment for acne   Other topical agents include salicylic acid, azelaic acid, dapsone, and sulfacetamide  Mild irritation and dryness can also occur when first using these products  USING YOUR TOPICAL TREATMENTS LIKE A PRO   Apply topical medications only to clean, dry skin  Topical medications may lead to significant dryness of the affected areas   To minimize this, wait 15-20 minutes after washing before applying your topical medication   These medications work deep in the skin to prevent new breakouts  Spot treatment of individual pimples does not do much  When applying topical medications to the face, use the 5-dot method  Start by placing a small pea-sized amount of the medication on your finger  Then, place dots in each of five locations of your face: Mid-forehead, each cheek, nose, and chin  Next, rub the medication into the entire area of skin - not just on individual pimples! Try to avoid the delicate skin around your eyes and corners of your mouth   The medications are not magic! They take weeks if not months to work  Be patient and use your medicine on a daily basis or as directed for six weeks before asking if your skin looks better  Try not to miss more than one or two days each week when using your medications   If you are starting a new medication, then try using it every other night or even every third night   Gradually work up to [place] a day    This will give your skin time to adjust    The same medications often come in various forms or formulations: Creams, ointments, lotions, gels, microspheres, or foams  Use the formulation that has been recommended and don't switch to other forms unless instructed  Some forms (such as alcohol based gels) may be more drying and less tolerable for certain skin types   Sometimes individual medications are not as effective as a combination of two or more agents  The doctor may need to try several medications or combinations before finding the one that is best for that patient   Moisturizer, sunscreen, and make-up may be used in conjunction with topical acne medications  In general, acne medications are applied first so they may directly contact the skin  Ask your physician to review specific application instructions!  It is especially important to always use sunscreen when using a topical retinoid or oral antibiotic   These drugs can make your skin more sensitive to the sun  In general, sunscreen gets applied AFTER any acne medications   Don't stop using your acne medications just because your acne got better  Remember, the acne is better because of the medication, and prevention is the key to treatment  HORMONAL THERAPY  Hormonal treatment is used only in females and usually consists of oral contraceptives (birth control pills)  Spironolactone is also sometimes used  ORAL CONTRACEPTIVE PILLS   This medication is also known as the Birth Control Pill    We use it for hormonal regulation of acne  Take this medication as directed on the medication packet  NOTE: Try to find a regular time in your day to take the pill so that you don't forget  The best time is about half an hour after a meal or snack, or at bedtime  If you do forget to take your daily pill at the regular time, take one as soon as you remember and take the next at your regular scheduled time  WARNING: Do not take this medication until discussing it with your physician if you smoke, are pregnant (or trying to become pregnant or could be pregnant), have a personal history of breast cancer, have any artificial hardware or implants, have a condition called Factor 5 Leiden deficiency, have a family history of clotting problems, regularly have migraine headaches (especially with aura or due to flashing lights), or have any vaginal bleeding other than that associated with your menstrual cycle  ORAL ISOTRETINOIN (used to be called the brand name Jack Thomas)  Isotretinoin, a derivative of vitamin A, is a powerful drug with several significant potential side effects  It is reserved for acne which is severe or when other medications have not worked well enough  It used to be sold under the brand name Accutane but now several versions exist       HAVING PROBLEMS WITH ANY OF YOUR TREATMENTS? You should not be able to see any of the medicines on your face   If you can see a white film on your skin after you apply the medication, there is too much medicine in that area and you need to apply a thinner coat and make sure it is spread evenly on your face  If your skin gets too dry, you can apply a light (non-comedogenic) moisturizer on top of your medicine or you may switch to using the medicine every other day instead of every day  If your skin is still too irritated, you may need to switch to a milder medication  If your skin is red and very itchy, you may be allergic to the medication and you should stop using it  COMMON POSSIBLE SIDE EFFECTS OF MEDICATIONS     Birth Control Pills - nausea; headaches; breast tenderness; feeling bloated; mood changes   Spotting between periods may occur for the first three weeks of the medication, but this is not serious  It may last for two or three cycles  Please call us if the bleeding is heavier than a light flow or lasts for more than a few days  WHEN AND WHERE TO CALL WITH CONCERNS  We are here to help! If you experience any unusual symptoms, then stop taking or using the medication and call our office at [phone] (SKIN)  It is better to be safe than to be sorry! 2  [place] ("SKIN TAG")    Physical Exam:   Anatomic Location Affected:  L inner thigh   Morphological Description:  Pedunculated papule   Pertinent Positives:   Pertinent Negatives: Additional History of Present Condition:  Patient was curious in the removal process for the skin tag      Assessment and Plan:  Based on a thorough discussion of this condition and the management approach to it (including a comprehensive discussion of the known risks, side effects and potential benefits of treatment), the patient (family) agrees to implement the following specific plan:   Discussed cosmetic procedure in skin tag removal    Skin tags are common, soft, harmless skin lesions that are also called, in the appropriate settings, papillomas, fibroepithelial polyps, and soft fibromas  They are made up of loosely arranged collagen fibers and blood vessels surrounded by a thickened or thinned-out epidermis  Skin tags tend to develop in both men and women as we grow older  They are usually found on the skin folds (neck, armpits, groin)  It is not known what specifically causes skin tags  Certain factors, though, do appear to play a role:   Chaffing and irritation from skin rubbing together   High levels of growth factors (as seen, for example, in pregnancy or in acromegaly/gigantism)   Insulin resistance   Human papillomavirus (wart virus)    We discussed that most skin tags do not need to be treated unless they are specifically causing the patient physical distress or limitation or pose a risk for a larger problem such as an infection that forms secondary to excoriation or chronic irritation      We had a thorough discussion of treatment options and specific risks (including that any procedural treatment may not be covered by insurance and would then be the patient's responsibility) and benefits/alternatives including but not limited to the following:   Cryotherapy (freezing)   Shave removal   Surgical excision (snip excision with scissors)   Electrosurgery   Ligation (we do not do this procedure and counseled against it due to risk of tissue necrosis and infection)      Scribe Attestation    I,:  Martha Leger am acting as a scribe while in the presence of the attending physician :       I,:  Yasmin Draper MD personally performed the services described in this documentation    as scribed in my presence :

## 2021-01-29 ENCOUNTER — APPOINTMENT (OUTPATIENT)
Dept: LAB | Facility: CLINIC | Age: 31
End: 2021-01-29
Payer: COMMERCIAL

## 2021-01-29 DIAGNOSIS — L70.0 ACNE CYSTICA: ICD-10-CM

## 2021-01-29 DIAGNOSIS — R35.0 URINARY FREQUENCY: ICD-10-CM

## 2021-01-29 LAB
ALBUMIN SERPL BCP-MCNC: 3.7 G/DL (ref 3.5–5)
ALP SERPL-CCNC: 64 U/L (ref 46–116)
ALT SERPL W P-5'-P-CCNC: 19 U/L (ref 12–78)
ANION GAP SERPL CALCULATED.3IONS-SCNC: 7 MMOL/L (ref 4–13)
AST SERPL W P-5'-P-CCNC: 11 U/L (ref 5–45)
B-HCG SERPL-ACNC: <2 MIU/ML
BASOPHILS # BLD AUTO: 0.03 THOUSANDS/ΜL (ref 0–0.1)
BASOPHILS NFR BLD AUTO: 1 % (ref 0–1)
BILIRUB SERPL-MCNC: 0.19 MG/DL (ref 0.2–1)
BUN SERPL-MCNC: 10 MG/DL (ref 5–25)
CALCIUM SERPL-MCNC: 8.8 MG/DL (ref 8.3–10.1)
CHLORIDE SERPL-SCNC: 104 MMOL/L (ref 100–108)
CHOLEST SERPL-MCNC: 197 MG/DL (ref 50–200)
CO2 SERPL-SCNC: 26 MMOL/L (ref 21–32)
CREAT SERPL-MCNC: 0.66 MG/DL (ref 0.6–1.3)
EOSINOPHIL # BLD AUTO: 0.12 THOUSAND/ΜL (ref 0–0.61)
EOSINOPHIL NFR BLD AUTO: 2 % (ref 0–6)
ERYTHROCYTE [DISTWIDTH] IN BLOOD BY AUTOMATED COUNT: 13.2 % (ref 11.6–15.1)
GFR SERPL CREATININE-BSD FRML MDRD: 116 ML/MIN/1.73SQ M
GLUCOSE P FAST SERPL-MCNC: 93 MG/DL (ref 65–99)
HCT VFR BLD AUTO: 37.5 % (ref 34.8–46.1)
HDLC SERPL-MCNC: 94 MG/DL
HGB BLD-MCNC: 11.8 G/DL (ref 11.5–15.4)
IMM GRANULOCYTES # BLD AUTO: 0.02 THOUSAND/UL (ref 0–0.2)
IMM GRANULOCYTES NFR BLD AUTO: 0 % (ref 0–2)
LDLC SERPL CALC-MCNC: 92 MG/DL (ref 0–100)
LYMPHOCYTES # BLD AUTO: 2.23 THOUSANDS/ΜL (ref 0.6–4.47)
LYMPHOCYTES NFR BLD AUTO: 34 % (ref 14–44)
MCH RBC QN AUTO: 28.4 PG (ref 26.8–34.3)
MCHC RBC AUTO-ENTMCNC: 31.5 G/DL (ref 31.4–37.4)
MCV RBC AUTO: 90 FL (ref 82–98)
MONOCYTES # BLD AUTO: 0.37 THOUSAND/ΜL (ref 0.17–1.22)
MONOCYTES NFR BLD AUTO: 6 % (ref 4–12)
NEUTROPHILS # BLD AUTO: 3.87 THOUSANDS/ΜL (ref 1.85–7.62)
NEUTS SEG NFR BLD AUTO: 57 % (ref 43–75)
NONHDLC SERPL-MCNC: 103 MG/DL
NRBC BLD AUTO-RTO: 0 /100 WBCS
PLATELET # BLD AUTO: 288 THOUSANDS/UL (ref 149–390)
PMV BLD AUTO: 10.2 FL (ref 8.9–12.7)
POTASSIUM SERPL-SCNC: 4.4 MMOL/L (ref 3.5–5.3)
PROT SERPL-MCNC: 7.3 G/DL (ref 6.4–8.2)
RBC # BLD AUTO: 4.15 MILLION/UL (ref 3.81–5.12)
SODIUM SERPL-SCNC: 137 MMOL/L (ref 136–145)
TRIGL SERPL-MCNC: 55 MG/DL
TSH SERPL DL<=0.05 MIU/L-ACNC: 2.56 UIU/ML (ref 0.36–3.74)
WBC # BLD AUTO: 6.64 THOUSAND/UL (ref 4.31–10.16)

## 2021-01-29 PROCEDURE — 80053 COMPREHEN METABOLIC PANEL: CPT

## 2021-01-29 PROCEDURE — 84443 ASSAY THYROID STIM HORMONE: CPT

## 2021-01-29 PROCEDURE — 85025 COMPLETE CBC W/AUTO DIFF WBC: CPT

## 2021-01-29 PROCEDURE — 84702 CHORIONIC GONADOTROPIN TEST: CPT

## 2021-01-29 PROCEDURE — 80061 LIPID PANEL: CPT

## 2021-01-29 PROCEDURE — 36415 COLL VENOUS BLD VENIPUNCTURE: CPT

## 2021-02-03 ENCOUNTER — TELEPHONE (OUTPATIENT)
Dept: DERMATOLOGY | Facility: CLINIC | Age: 31
End: 2021-02-03

## 2021-02-03 NOTE — TELEPHONE ENCOUNTER
Patient calling to see if Provider had a chance to look at her lab results and send in her prescription for accutane, I did not see an order for accutane in her chart and advised her that we would get back to her some time today, her labs were drawn pn 1/29/2021  She would like a call back today with an update of when her script will be sent in  Physical Therapy Daily Progress Note      Visit # 11      Subjective Evaluation    History of Present Illness    Subjective comment: Pt reports that the top of his foot does not hurt as much as it has been.       Objective   See Exercise, Manual, and Modality Logs for complete treatment.       Assessment & Plan     Assessment  Assessment details: Pt tolerated treatment very well. He had no c/o pain on any exercise preformed today. He was able to progress reps on several strengthening exercises without issue.  He is able to work through his exercises with minimal cues.                       Manual Therapy:    0     mins  89799;  Therapeutic Exercise:    40     mins  30539;     Neuromuscular Lakhwinder:    0    mins  06201;    Therapeutic Activity:     0     mins  99116;     Gait Trainin     mins  04164;     Ultrasound:     0     mins  03021;    Work Hardening           0      mins 40971  Iontophoresis               0   mins 07357  E-Stim                          _0_ mins 35875 ( )    Timed Treatment:   40   mins   Total Treatment:     45   mins    Rufus Worthy PTA  Physical Therapist Assistant

## 2021-02-03 NOTE — TELEPHONE ENCOUNTER
Please remind her that females have a 30 day waiting period and need a second negative urine pregnancy test before they can start accutane    Should have a follow up the end of this month

## 2021-02-03 NOTE — TELEPHONE ENCOUNTER
I called and spoke with patient, she confirmed understanding and scheduled an appointment for 3/1/2021

## 2021-03-01 ENCOUNTER — OFFICE VISIT (OUTPATIENT)
Dept: DERMATOLOGY | Facility: CLINIC | Age: 31
End: 2021-03-01
Payer: COMMERCIAL

## 2021-03-01 VITALS — BODY MASS INDEX: 25.33 KG/M2 | TEMPERATURE: 98.3 F | WEIGHT: 143 LBS

## 2021-03-01 DIAGNOSIS — Z79.899 HIGH RISK MEDICATION USE: ICD-10-CM

## 2021-03-01 DIAGNOSIS — Z51.81 MEDICATION MONITORING ENCOUNTER: ICD-10-CM

## 2021-03-01 DIAGNOSIS — L70.0 ACNE VULGARIS: Primary | ICD-10-CM

## 2021-03-01 DIAGNOSIS — L85.3 XEROSIS OF SKIN: ICD-10-CM

## 2021-03-01 DIAGNOSIS — L91.8 INFLAMED SKIN TAG: ICD-10-CM

## 2021-03-01 LAB — SL AMB POCT URINE HCG: NEGATIVE

## 2021-03-01 PROCEDURE — 99213 OFFICE O/P EST LOW 20 MIN: CPT | Performed by: DERMATOLOGY

## 2021-03-01 PROCEDURE — 1036F TOBACCO NON-USER: CPT | Performed by: DERMATOLOGY

## 2021-03-01 PROCEDURE — 81025 URINE PREGNANCY TEST: CPT | Performed by: DERMATOLOGY

## 2021-03-01 PROCEDURE — NC001 PR NO CHARGE: Performed by: DERMATOLOGY

## 2021-03-01 RX ORDER — NORGESTIMATE AND ETHINYL ESTRADIOL 0.25-0.035
KIT ORAL
Qty: 30 TABLET | Refills: 3 | Status: SHIPPED | OUTPATIENT
Start: 2021-03-01 | End: 2021-03-03 | Stop reason: CLARIF

## 2021-03-01 RX ORDER — ISOTRETINOIN 20 MG/1
CAPSULE ORAL
Qty: 60 CAPSULE | Refills: 0 | Status: SHIPPED | OUTPATIENT
Start: 2021-03-01 | End: 2021-03-03 | Stop reason: CLARIF

## 2021-03-01 NOTE — PROGRESS NOTES
[facility] Follow Up Note    Patient Name: Amber Siddiqui  Encounter Date: 03/1/2021    Today's Chief Concerns:  Jovita Gates Concern #1: Acne     Current Medications:    Current Outpatient Medications:     cetirizine (ZyrTEC) 10 mg tablet, Take 10 mg by mouth daily, Disp: , Rfl:     Iron-Vitamin C (IRON 100/C PO), Take by mouth, Disp: , Rfl:     metoclopramide (Reglan) 10 mg tablet, , Disp: , Rfl:     Multiple Vitamins-Minerals (multivitamin with minerals) tablet, Take 1 tablet by mouth daily, Disp: , Rfl:     Omega-3 Fatty Acids (fish oil) 1,000 mg, Take 1,000 mg by mouth daily, Disp: , Rfl:     Prenatal Vit-Fe Fumarate-FA (prenatal vitamin) 28-0 8 mg, Take 1 tablet by mouth daily, Disp: 100 tablet, Rfl: 3    spironolactone (ALDACTONE) 25 mg tablet, Take 1 tablet (25 mg total) by mouth daily, Disp: 30 tablet, Rfl: 2    valACYclovir (VALTREX) 500 mg tablet, Take 500 mg by mouth 2 (two) times a day, Disp: , Rfl:     vitamin B-12 (CYANOCOBALAMIN) 100 MCG TABS, Take 50 mcg by mouth daily, Disp: , Rfl:     VITAMIN D PO, Take by mouth, Disp: , Rfl:     CONSTITUTIONAL:   There were no vitals filed for this visit  Specific Alerts:    Have you been seen by a [facility] Dermatologist in the last 3 years? YES    Are you pregnant or planning to become pregnant? No    Are you currently or planning to be nursing or breast feeding? No    No Known Allergies    May we call your Preferred Phone number to discuss your specific medical information? YES    May we leave a detailed message that includes your specific medical information? YES    Have you traveled outside of the [facility] in the past 3 months? No    Do you currently have a pacemaker or defibrillator? No    Do you have any artificial heart valves, joints, plates, screws, rods, stents, pins, etc? No    Do you require any medications prior to a surgical procedure?  No    Are you taking any medications that cause you to bleed more easily ("blood thinners") No    Have you ever experienced a rapid heartbeat with epinephrine? No      Review of Systems:  Have you recently had or currently have any of the following? · Fever or chills: No  · Night Sweats: No  · Headaches: No  · Weight Gain: No  · Weight Loss: No  · Blurry Vision: No  · Nausea: No  · Vomiting: No  · Diarrhea: No  · Blood in Stool: No  · Abdominal Pain: No  · Itchy Skin: No  · Painful Joints: No  · Swollen Joints: No  · Muscle Pain: No  · Irregular Mole: No  · Sun Burn: No  · Dry Skin: No  · Skin Color Changes: No  · Scar or Keloid: No  · Cold Sores/Fever Blisters: No  · Bacterial Infections/MRSA: No  · Anxiety: No  · Depression: No  · Suicidal or Homicidal Thoughts: No    PSYCH: Normal mood and affect  EYES: Normal conjunctiva  ENT: Normal lips and oral mucosa  CARDIOVASCULAR: No edema  RESPIRATORY: Normal respirations  HEME/LYMPH/IMMUNO:  No regional lymphadenopathy except as noted below in ASSESSMENT AND PLAN BY DIAGNOSIS    FULL ORGAN SYSTEM SKIN EXAM (SKIN)   Hair, Scalp, Ears, Face Normal except as noted below in Assessment       ISOTRETINOIN "ACCUTANE": FEMALE    Age: 33 y o  Weight (in KILOgrams): 143 0lb    Ipledge number: [id number]  Last 4 digits SS: [id number]    Contraception Type 1: hormonal combination oral contraceptive pill  Contraception Type 2: male latex condom  Patient reminded that this must be listed in same order on iPledge   Yes       "Goal Dose" in mg (125 mg x weight in kg): 8,113 mg    Interim month   "Daily Dose" of Isotretinoin the patient has actually been taking since last visit (this is usually what was prescribed): 0 mg   "Total # of Days" patient took Isotretinoin since last visit (this is usually 30):  0 days   "Total Monthly Dose" in mg since last visit (this equals "Daily Dose" x "Total # of Days"): 0 mg    Cumulative dosage   "Total cumulative Dose" in mg (add the above "Total Monthly Dose" with "Total Cumulative Dose" from last visit: 0 mg        Symptoms:Patient has not yet started accutane  Physical Exam   Psychiatric/Mood: Good    Anatomic Location Affected: Face   Morphological Description:  o Open/Closed Comedones:  - Several ("Moderate")  o Inflammatory Papules/Pustules:  - Several ("Moderate")  o Nodules:  - Several ("Moderate")  o Scarring:  - Many ("Severe")  o Excoriations:  - Rare ("Almost Clear")  o Local Skin Redness/Erythema:  - Several ("Moderate")  o Local Skin Dryness/Scaling:  - Few ("Mild")  o Local Skin Dyspigmentation:  - Few ("Mild")   Pertinent Positives:   Pertinent Negatives:    Labs   Date of last labs: 1/29/2021   Completed: YES   Labs reviewed: within normal limits   Pregnancy test: urine pregnancy in office  - Result: negative  - Interpretation- pregnant: No    DIAGNOSES:     Acne Vulgaris   High Risk Medication   Medication Monitoring   Xerosis (see below for patient education)    Assessment and Plan:   Target Total Dose per KILOgram:  125 mg/KILOgram (this may change this on a patient-by-patient basis)   Planned daily dose for next 30 days: 20 mg twice daily (total of 40 mg)   Return to clinic in about 1 month, please review ipledge guidelines in terms of timing (see below for patient education)   Get labs 1-2 days before next appointment: YES   Patient confirmed in iPledge: NO, system down   Patients counseled:  - Cannot donate blood while taking isotretinoin and for 1 month after completing therapy  YES  - Do not share medication with anyone  YES  - Possible side effects discussed, including sun sensitivity, dry lips/eyes/skin, headaches, blurry vision (pseudotumor cerebri), muscle/joint aches, GI upset, bloody stools (IBD including Crohns/Ulcerative Colitis), jaundice, liver dysfunction, lipid abnormalities, bone marrow dysfunction, mood effects, thoughts of hurting oneself or others, and flaring of acne (acne fulminans/SAPPHO)   YES  - Females cannot get pregnant and must be on two forms of birth control while on therapy and at least one month after  YES  - Report any side effects of mood swings or depression and stop medication upon occurrence  YES  - Patient needs to confirm counseling in iPledge prior to picking up prescription  YES      Isotretinoin for Acne   Isotretinoin is a retinoid medication that is taken by mouth to treat severe nodular acne  Typically, it is used once other acne treatments have not worked, such as oral antibiotics  Usually isotretinoin is taken for 4 to 6 months, although the length of treatment can vary from person to person  While most patients acne improves and may even clear with this medication, in 20% of patients acne can come back  This requires additional acne treatment or even a second cycle of isotretinoin  How should I take isotretinoin?  Isotretinoin dosing is weight-based and should be taken exactly as prescribed   If you miss a dose, skip that dose  Do not take 2 doses at the same time   Take with food to help with absorption   All instructions in the iPLEDGE program packet (www [employer]) that was provided must be followed (see below for highlights)   You will get a 30 day supply of isotretinoin at a time  It cannot be automatically refilled  Make certain that you have been given enough medication to last 30 days as pharmacists are unable to refill or make changes within a month   You must return to your dermatologist every month to make sure you are not having any serious side effects from isotretinoin  For female patients, this visit will always include a monthly pregnancy test  Other laboratory studies, including liver function tests, cholesterol and triglycerides, must also be conducted before and during treatment  What should I avoid while taking isotretinoin?  Do not get pregnant from one month prior or 1 month following taking any isotretinoin      Do not donate blood while take isotretinoin or until 1 months after coming off the medication   Do not have cosmetic procedures to smooth your skin, including waxing, dermabrasion, or laser procedures, while taking this medication and for at least 6 months after you stop   Do not share isotretinoin with any other people  It can cause birth defects and other serious health problems   Do not use any other acne medications, including medicated washes, cleansers, creams or antibiotic pills during your treatment with isotretinoin unless expressly directed to by your dermatologist      Initiating isotretinoin & the iPLEDGE Program    The iPLEDGE Program is a strict, government-required program to prevent females from becoming pregnant while on isotretinoin  All females and males must participate  Note: Your provider must follow this program and cannot change any of the requirements   Before starting isotretinoin, your provider will talk to you about the safe use of this medication and you will need to sign consent forms in order to receive treatment   If you fail to keep appointments, you will be unable to get your prescription filled  Calin Duarte For females of childbearing age:      o [address] 21 must be on two specific forms of birth control before starting isotretinoin  Your provider must get 2 negative pregnancy tests, at least 30 days apart, before you can proceed with the medication  The second pregnancy test must be obtained within 5 days of the menstrual cycle  If you choose not to be sexually active during treatment, you still must have the 2 negative pregnancy tests    o You must answer a series of questions either online or by phone every month prior to getting the prescription  o Monthly prescriptions must be filled within 7 days of that month's pregnancy test   It is important that you notify your physician well before the 7th day if there are any unforeseen delays, such as prior authorizations    o For more information, visit: [email or web address]    What are the possible side effects of isotretinoin? What should I do? Most side effects from isotretinoin are mild and can be easily improved with simple remedies  Others are more concerning   Dry skin and eyes, chapped lips and dry nose that may lead to nosebleeds  o Dry Skin: Apply sensitive skin moisturizers to dry skin at least two times a day  You may need sunscreen (SPF 30) in the morning and to reapply when outside  o Dry Eyes: Use saline eye drops or artificial tears  o Dry Nose/Nosebleeds: Use saline nasal spray (ex  Ayr) during the day and at bedtime  To stop nosebleeds, hold pressure  If this does not work, call your dermatologist     o Chapped lips: apply petrolatum-based lip balms routinely  Avoid anything medicated   Contact your dermatologist for excessive dryness, cracks, tenderness or pain   Increased blood fats and cholesterol (usually in patients with a personal or family history of cholesterol or triglyceride problems)   Vision problems affecting your ability to see in the dark and drive at night   Bone, muscle and tendon aches can occur  Additional stretching before and after activities may help relieve aches  If you are otherwise healthy, consider the use of ibuprofen or naproxen  If you are unsure if you can use these pain medications, ask your doctor first  Also, call your doctor if you experience severe back pain, joint pain, or a broken bone    Changes in mood, including anxiety, depressive symptoms or suicidal thoughts which may or may not be temporary  Notify your doctor if your or a family member have suffered from these conditions or if you have any concerns during treatment   Serious birth defects or miscarriage can occur while taking this medication and for one month after taking your last dose of isotretinoin  You must not get pregnant while taking isotretinoin   Once the medication is out of your system, 30 days, there is no effect on the baby   Increased pressure in the brain  Call your doctor right away if you experience bad headache, blurred vision, dizziness, nausea or vomiting, or seizures   Skin rash - call your doctor right away if you develop any rashes or blisters on the skin   Liver damage - call your doctor right away if you experience severe stomach, chest or bowel pain, painful swallowing, diarrhea, blood in your stool, yellowing of your skin or eyes, or dark urine   Gastrointestinal bleeding  If you experience unusual abdominal pain or red or black/tarry stools, call your doctor immediately  You should also notify your doctor if you or a family member has a history of ulcerative colitis or Crohns disease   Worsening acne  Mild worsening of acne can occur in the first few weeks of using isotreinoin  If your acne is getting significantly worse, call your doctor  This may require temporarily stopping the isotretinoin and possibly adding other medications  XEROSIS ("DRY SKIN")    Dry skin refers to skin that feels dry to touch  Dry skin has a dull surface with a rough, scaly quality  The skin is less pliable and cracked  When dryness is severe, the skin may become inflamed and fissured  Although any body site can be dry, dry skin tends to affect the shins more than any other site  Dry skin is lacking moisture in the outer horny cell layer (stratum corneum) and this results in cracks in the skin surface  Dry skin is also called xerosis, xeroderma or asteatosis (lack of fat)  It can affect males and females of all ages  There is some racial variability in water and lipid content of the skin   Dry skin that starts in early childhood may be one of about 20 types of ichthyosis (fish-scale skin)  There is often a family history of dry skin   Dry skin is commonly seen in people with atopic dermatitis   Nearly everyone > 60 years has dry skin      Dry skin that begins later may be seen in people with certain diseases and conditions   Postmenopausal women   Hypothyroidism   Chronic renal disease    Malnutrition and weight loss    Subclinical dermatitis    Treatment with certain drugs such as oral retinoids, diuretics and epidermal growth factor receptor inhibitors    People exposed to a dry environment may experience dry skin   Low humidity: in desert climates or cool, windy conditions    Excessive air conditioning    Direct heat from a fire or fan heater    Excessive bathing    Contact with soap, detergents and solvents    Inappropriate topical agents such as alcohol    Frictional irritation from rough clothing or abrasives    Dry skin is due to abnormalities in the integrity of the barrier function of the stratum corneum, which is made up of corneocytes   There is an overall reduction in the lipids in the stratum corneum   Ratio of ceramides, cholesterol and free fatty acids may be normal or altered   There may be a reduction in the proliferation of keratinocytes   Keratinocyte subtypes change in dry skin with a decrease in keratins K1, K10 and increase in K5, K14     Involucrin (a protein) may be expressed early, increasing cell stiffness   The result is retention of corneocytes and reduced water-holding capacity  What is the treatment for dry skin? The mainstay of treatment of dry skin and ichthyosis is moisturisers/emollients  They should be applied liberally and often enough to:   Reduce itch    Improve the barrier function    Prevent entry of irritants, bacteria    Reduce transepidermal water loss  When considering which emollient is most suitable, consider:   Severity of the dryness    Tolerance    Personal preference    Cost and availability  Emollients generally work best if applied to damp skin, if pH is below 7 (acidic), and if containing humectants such as urea or propylene glycol    Additional treatments include:   Topical steroid if itchy or there is dermatitis -- choose an emollient base    Topical calcineurin inhibitors if topical steroids are unsuitable  How can dry skin be prevented? Eliminate aggravating factors:   Reduce the frequency of bathing   A humidifier in winter and air conditioner in summer    Compare having a short shower with a prolonged soak in a bath   Use lukewarm, not hot, water   Replace standard soap with a substitute such as a synthetic detergent cleanser, water-miscible emollient, bath oil, anti-pruritic tar oil, colloidal oatmeal etc     Apply an emollient liberally and often, particularly shortly after bathing, and when itchy  The drier the skin, the thicker this should be, especially on the hands  What is the outlook for dry skin? A tendency to dry skin may persist life-long, or it may improve once contributing factors are controlled  INFLAMED ACROCHORDON ("SKIN TAG")    Physical Exam:   Anatomic Location Affected:  Left thigh    Morphological Description:  Red papule   Pertinent Positives:   Pertinent Negatives: Additional History of Present Condition:  Patient following up with an irritated skin tag  Assessment and Plan:  Based on a thorough discussion of this condition and the management approach to it (including a comprehensive discussion of the known risks, side effects and potential benefits of treatment), the patient (family) agrees to implement the following specific plan:   Cryotherapy preformed at visit  Skin tags are common, soft, harmless skin lesions that are also called, in the appropriate settings, papillomas, fibroepithelial polyps, and soft fibromas  They are made up of loosely arranged collagen fibers and blood vessels surrounded by a thickened or thinned-out epidermis  Skin tags tend to develop in both men and women as we grow older  They are usually found on the skin folds (neck, armpits, groin)    It is not known what specifically causes skin tags  Certain factors, though, do appear to play a role:   Chaffing and irritation from skin rubbing together   High levels of growth factors (as seen, for example, in pregnancy or in acromegaly/gigantism)   Insulin resistance   Human papillomavirus (wart virus)    We discussed that most skin tags do not need to be treated unless they are specifically causing the patient physical distress or limitation or pose a risk for a larger problem such as an infection that forms secondary to excoriation or chronic irritation  We had a thorough discussion of treatment options and specific risks (including that any procedural treatment may not be covered by insurance and would then be the patient's responsibility) and benefits/alternatives including but not limited to the following:   Cryotherapy (freezing)   Shave removal   Surgical excision (snip excision with scissors)   Electrosurgery   Ligation (we do not do this procedure and counseled against it due to risk of tissue necrosis and infection)      PROCEDURE:  DESTRUCTION OF BENIGN LESIONS  After a thorough discussion of treatment options and risk/benefits/alternatives (including but not limited to local pain, scarring, dyspigmentation, blistering, and possible superinfection), verbal and written consent were obtained and the aforementioned lesions were treated on with cryotherapy using liquid nitrogen x 1 cycle for 5-10 seconds   TOTAL NUMBER of 1 lesions were treated today on the ANATOMIC LOCATION: Left thigh  The patient tolerated the procedure well, and after-care instructions were provided        Scribe Attestation    I,:  Juan Nur MA am acting as a scribe while in the presence of the attending physician :       I,:  Consuelo Maloney MD personally performed the services described in this documentation    as scribed in my presence :

## 2021-03-03 ENCOUNTER — TELEPHONE (OUTPATIENT)
Dept: DERMATOLOGY | Facility: CLINIC | Age: 31
End: 2021-03-03

## 2021-03-03 DIAGNOSIS — L70.0 ACNE VULGARIS: ICD-10-CM

## 2021-03-03 DIAGNOSIS — Z79.899 HIGH RISK MEDICATION USE: Primary | ICD-10-CM

## 2021-03-03 DIAGNOSIS — Z79.899 HIGH RISK MEDICATION USE: ICD-10-CM

## 2021-03-03 RX ORDER — NORGESTIMATE AND ETHINYL ESTRADIOL 0.25-0.035
KIT ORAL
Qty: 30 TABLET | Refills: 3 | Status: SHIPPED | OUTPATIENT
Start: 2021-03-03 | End: 2021-03-03 | Stop reason: SDUPTHER

## 2021-03-03 RX ORDER — ISOTRETINOIN 20 MG/1
CAPSULE ORAL
Qty: 60 CAPSULE | Refills: 0 | Status: SHIPPED | OUTPATIENT
Start: 2021-03-03 | End: 2021-03-03 | Stop reason: CLARIF

## 2021-03-03 RX ORDER — ISOTRETINOIN 20 MG/1
CAPSULE ORAL
Qty: 60 CAPSULE | Refills: 0 | Status: SHIPPED | OUTPATIENT
Start: 2021-03-03 | End: 2021-03-03 | Stop reason: SDUPTHER

## 2021-03-03 RX ORDER — NORGESTIMATE AND ETHINYL ESTRADIOL 0.25-0.035
KIT ORAL
Qty: 30 TABLET | Refills: 3 | Status: SHIPPED | OUTPATIENT
Start: 2021-03-03 | End: 2022-02-18

## 2021-03-03 RX ORDER — NORGESTIMATE AND ETHINYL ESTRADIOL 0.25-0.035
KIT ORAL
Qty: 30 TABLET | Refills: 3 | Status: SHIPPED | OUTPATIENT
Start: 2021-03-03 | End: 2021-03-03 | Stop reason: CLARIF

## 2021-03-03 RX ORDER — ISOTRETINOIN 20 MG/1
CAPSULE ORAL
Qty: 60 CAPSULE | Refills: 0 | Status: SHIPPED | OUTPATIENT
Start: 2021-03-03 | End: 2021-04-26 | Stop reason: SDUPTHER

## 2021-03-03 NOTE — TELEPHONE ENCOUNTER
Pt called saying she was sorry for calling back again She asked that RX for Accutane be sent to the [place] [id number] pharmacy  She said her insurance has changed and this is the reason for the confusion

## 2021-03-03 NOTE — TELEPHONE ENCOUNTER
Patient changed pharmacies, I have discontinued that previous order and have  Added the medication to the correct pharmacy, Please sign

## 2021-03-03 NOTE — TELEPHONE ENCOUNTER
Pt called requesting that her medication accutane and birth control be sent to the [facility] in [place] she stated that she's having problems with [facility] pharmacy

## 2021-03-05 NOTE — TELEPHONE ENCOUNTER
Fax received from [address] [place] [address] Meds Prior Auth for Absorica 20MG Caps it has been scanned into pt chart  Please review

## 2021-03-17 NOTE — PROGRESS NOTES
Clinical Trial Consent Note  Clinical Trial: GE Ultrasound Performance Evaluation study  : Dr Ander MANCIA (Jjessica)  Mirta Villanueva was approached on 012/16/2020 for consent to enroll into the GE Ultrasound Performance Evaluation study  Consent form was reviewed with the patient, patient was given the opportunity to ask questions  All the patient's questions were answered  The patient verbalized understanding of the study, agreed to proceed with study activities, and signed & dated the informed consent  A copy was provided to the patient for their own future reference  The patient will now begin study as outlined in protocol  See additional documentation for associated visit activities completed  Documentation of Informed Consent Process for Clinical Research Study    Element of Informed Consent Discussed Date Initials/ Person obtaining consent   A statement that the study involves research, an explanation of the purposes of the research and the expected duration of the subject's participation, a description of the procedures to be followed, and identification of any procedures which are experimental 12/16/2020 [address]   A description of any reasonably foreseeable risks or discomforts to the subject  12/16/2020 [facility]   A description of any benefits to the subject or to others which may reasonably be expected from the research  12/16/2020 [address]   A disclosure of appropriate alternative procedures or courses of treatment, if any, that might be advantageous to the subject   12/16/2020 [facility]   A statement describing the extent, if any, to which confidentiality of records identifying the subject will be maintained and that notes the possibility that the Food and Drug Administration may inspect the records 12/16/2020 [address]   For research involving more than minimal risk, an explanation as to whether any compensation and an explanation as to whether any medical treatments are available if injury occurs and, if so, what they consist of, or where further information may be obtained  12/16/2020 [facility]   An explanation of whom to contact for answers to pertinent questions about the research and research subjects' rights, and whom to contact in the event12/16/2020 of a research-related injury to the subject  12/16/2020 [address]   A statement that participation is voluntary, that refusal to participate will involve no penalty or loss of benefits to which the subject is otherwise entitled, and that the subject may discontinue participation at any time without penalty or loss of benefits to which the subject is otherwise entitled  12/16/2020 [facility]   A statement that the particular treatment or procedure may involve risks to the subject (or to the embryo or fetus, if the subject is or may become pregnant) which are currently unforeseeable 12/16/2020 [address]   Anticipated circumstances under which the subject's participation may be terminated by the investigator without regard to the subject's consent  12/16/2020 [facility]   Any additional costs to the subject that may result from participation in the research 12/16/2020 [address]   The consequences of a subjects' decision to withdraw from the research and procedures for orderly termination of participation by the subject  12/16/2020 [facility]   A statement that significant new findings developed during the course of the research which may relate to the subject's willingness to continue participation will be provided to the subject  12/16/2020 [facility]   The approximate number of subjects involved in the study  [address]       Study title:      GE Ultrasound Performance Evaluation Study   This signed and dated document shall serve as certification that all of the below listed required elements of informed consent were provided to the subject or legally authorized representative signing the actual Informed Consent Document, both in written and verbal format       The HIPAA consent is contained within the Informed Consent document and has also been discussed  A copy of the actual signed and dated Informed Consent has also been provided to the subject or legally authorized representative, and the original signed document shall be maintained in the research shadow chart  The patient speaks, reads and understands English?  _X_ Y__N     If NO, Name of :___________________________________      speaks, reads, and understands English?     _X_ Y __N     Process utilized to obtain consent:____________________________________________________    Subject meets eligibility criteria as outline in the current protocol? _X_Y __N    __ N/A - Reason: ___________________________________________________________    The protocol consent was reviewed with the patient and all questions were addressed and answered?    _X_ Y __N      The subject agreed to participate and the consent was signed and dated? __Y __N      Consent was obtained prior to any research procedures being performed?     __Y __N        Date  ICF signed ___[id number]___________________________          Certification of Person Conducting the Consent Process:                                                                                _____SUSAN HAHN________________________________  Printed Name      ___Susan Hahn________________________ __12/16/2020_______  Signature      Date

## 2021-04-01 ENCOUNTER — TELEPHONE (OUTPATIENT)
Dept: DERMATOLOGY | Facility: CLINIC | Age: 31
End: 2021-04-01

## 2021-04-01 ENCOUNTER — APPOINTMENT (OUTPATIENT)
Dept: LAB | Facility: AMBULARY SURGERY CENTER | Age: 31
End: 2021-04-01
Attending: OBSTETRICS & GYNECOLOGY

## 2021-04-01 DIAGNOSIS — O03.4 INCOMPLETE MISCARRIAGE: ICD-10-CM

## 2021-04-01 DIAGNOSIS — L70.0 ACNE VULGARIS: Primary | ICD-10-CM

## 2021-04-01 DIAGNOSIS — Z79.899 HIGH RISK MEDICATION USE: ICD-10-CM

## 2021-04-01 NOTE — TELEPHONE ENCOUNTER
Spoke with patient advised that the additional blood work orders are in her chart  Patient will have blood work done  Patient asking for clarification of dose of Accutane  Advised patient that prescription is written at Accutane 20 mg twice daily  Pt verbalized understanding  Patient also requesting appointment for 4/5/2021 be changed from [facility] to [facility]  I did check and there are no openings  Patient will keep appointment for CV

## 2021-04-01 NOTE — TELEPHONE ENCOUNTER
Received voicemail from patient stating she went for her blood work for Accutane and the only order in her chart was for hCG  Patient is asking if she needs other blood work done for her appointment on Monday? I don't see anything documented at last visit

## 2021-04-01 NOTE — TELEPHONE ENCOUNTER
Yes  there should have been other labs  Probably the assistant forgot to put in as standing orders  I put in the orders for liver function and cholesterol and made them standing orders so she should be good for next time, too

## 2021-04-02 ENCOUNTER — APPOINTMENT (OUTPATIENT)
Dept: LAB | Facility: AMBULARY SURGERY CENTER | Age: 31
End: 2021-04-02
Attending: OBSTETRICS & GYNECOLOGY
Payer: COMMERCIAL

## 2021-04-02 LAB
ALT SERPL W P-5'-P-CCNC: 16 U/L (ref 12–78)
AST SERPL W P-5'-P-CCNC: 18 U/L (ref 5–45)
B-HCG SERPL-ACNC: <2 MIU/ML
CHOLEST SERPL-MCNC: 208 MG/DL (ref 50–200)
TRIGL SERPL-MCNC: 46 MG/DL

## 2021-04-02 PROCEDURE — 84460 ALANINE AMINO (ALT) (SGPT): CPT | Performed by: DERMATOLOGY

## 2021-04-02 PROCEDURE — 82465 ASSAY BLD/SERUM CHOLESTEROL: CPT | Performed by: DERMATOLOGY

## 2021-04-02 PROCEDURE — 36415 COLL VENOUS BLD VENIPUNCTURE: CPT | Performed by: DERMATOLOGY

## 2021-04-02 PROCEDURE — 84478 ASSAY OF TRIGLYCERIDES: CPT | Performed by: DERMATOLOGY

## 2021-04-02 PROCEDURE — 84450 TRANSFERASE (AST) (SGOT): CPT | Performed by: DERMATOLOGY

## 2021-04-02 PROCEDURE — 84702 CHORIONIC GONADOTROPIN TEST: CPT

## 2021-04-05 ENCOUNTER — OFFICE VISIT (OUTPATIENT)
Dept: DERMATOLOGY | Facility: CLINIC | Age: 31
End: 2021-04-05
Payer: COMMERCIAL

## 2021-04-05 ENCOUNTER — DOCUMENTATION (OUTPATIENT)
Dept: DERMATOLOGY | Facility: CLINIC | Age: 31
End: 2021-04-05

## 2021-04-05 VITALS — HEIGHT: 62 IN | WEIGHT: 137 LBS | TEMPERATURE: 98.2 F | BODY MASS INDEX: 25.21 KG/M2

## 2021-04-05 DIAGNOSIS — L70.0 ACNE VULGARIS: Primary | ICD-10-CM

## 2021-04-05 PROCEDURE — 99214 OFFICE O/P EST MOD 30 MIN: CPT | Performed by: STUDENT IN AN ORGANIZED HEALTH CARE EDUCATION/TRAINING PROGRAM

## 2021-04-05 RX ORDER — ISOTRETINOIN 40 MG/1
40 CAPSULE ORAL 2 TIMES DAILY
Qty: 60 CAPSULE | Refills: 0 | Status: SHIPPED | OUTPATIENT
Start: 2021-04-05 | End: 2021-06-23

## 2021-04-05 NOTE — PATIENT INSTRUCTIONS
DIAGNOSES:     Acne Vulgaris   High Risk Medication   Medication Monitoring   Xerosis (see below for patient education)    Assessment and Plan:   Target Total Dose per KILOgram:  125 mg/KILOgram (this may change this on a patient-by-patient basis)   Planned daily dose for next 30 days: 80 mg/day   (please remember to add patient's "Ipledge number" to actual prescription):  [id number]   Return to clinic in about 1 month, please review ipledge guidelines in terms of timing (see below for patient education)   Get labs 1-2 days before next appointment: YES   Patient confirmed in iPledge: YES   Patients counseled:  - Cannot donate blood while taking isotretinoin and for 1 month after completing therapy  YES  - Do not share medication with anyone  YES  - Possible side effects discussed, including sun sensitivity, dry lips/eyes/skin, headaches, blurry vision (pseudotumor cerebri), muscle/joint aches, GI upset, bloody stools (IBD including Crohns/Ulcerative Colitis), jaundice, liver dysfunction, lipid abnormalities, bone marrow dysfunction, mood effects, thoughts of hurting oneself or others, and flaring of acne (acne fulminans/SAPPHO)  YES  - Females cannot get pregnant and must be on two forms of birth control while on therapy and at least one month after  YES  - Report any side effects of mood swings or depression and stop medication upon occurrence  YES  - Patient needs to confirm counseling in iPledge prior to picking up prescription  YES      Isotretinoin for Acne   Isotretinoin is a retinoid medication that is taken by mouth to treat severe nodular acne  Typically, it is used once other acne treatments have not worked, such as oral antibiotics  Usually isotretinoin is taken for 4 to 6 months, although the length of treatment can vary from person to person  While most patients acne improves and may even clear with this medication, in 20% of patients acne can come back   This requires additional acne treatment or even a second cycle of isotretinoin  How should I take isotretinoin?  Isotretinoin dosing is weight-based and should be taken exactly as prescribed   If you miss a dose, skip that dose  Do not take 2 doses at the same time   Take with food to help with absorption   All instructions in the iPLEDGE program packet (www [employer]) that was provided must be followed (see below for highlights)   You will get a 30 day supply of isotretinoin at a time  It cannot be automatically refilled  Make certain that you have been given enough medication to last 30 days as pharmacists are unable to refill or make changes within a month   You must return to your dermatologist every month to make sure you are not having any serious side effects from isotretinoin  For female patients, this visit will always include a monthly pregnancy test  Other laboratory studies, including liver function tests, cholesterol and triglycerides, must also be conducted before and during treatment  What should I avoid while taking isotretinoin?  Do not get pregnant from one month prior or 1 month following taking any isotretinoin   Do not donate blood while take isotretinoin or until 1 months after coming off the medication   Do not have cosmetic procedures to smooth your skin, including waxing, dermabrasion, or laser procedures, while taking this medication and for at least 6 months after you stop   Do not share isotretinoin with any other people  It can cause birth defects and other serious health problems   Do not use any other acne medications, including medicated washes, cleansers, creams or antibiotic pills during your treatment with isotretinoin unless expressly directed to by your dermatologist      Initiating isotretinoin & the iPLEDGE Program    The iPLEDGE Program is a strict, government-required program to prevent females from becoming pregnant while on isotretinoin   All females and males must participate  Note: Your provider must follow this program and cannot change any of the requirements   Before starting isotretinoin, your provider will talk to you about the safe use of this medication and you will need to sign consent forms in order to receive treatment   If you fail to keep appointments, you will be unable to get your prescription filled  Pricila Courser For females of childbearing age:      o Hussein Leblanc must be on two specific forms of birth control before starting isotretinoin  Your provider must get 2 negative pregnancy tests, at least 30 days apart, before you can proceed with the medication  The second pregnancy test must be obtained within 5 days of the menstrual cycle  If you choose not to be sexually active during treatment, you still must have the 2 negative pregnancy tests    o You must answer a series of questions either online or by phone every month prior to getting the prescription  o Monthly prescriptions must be filled within 7 days of that month's pregnancy test   It is important that you notify your physician well before the 7th day if there are any unforeseen delays, such as prior authorizations  o For more information, visit: [email or web address]    What are the possible side effects of isotretinoin? What should I do? Most side effects from isotretinoin are mild and can be easily improved with simple remedies  Others are more concerning   Dry skin and eyes, chapped lips and dry nose that may lead to nosebleeds  o Dry Skin: Apply sensitive skin moisturizers to dry skin at least two times a day  You may need sunscreen (SPF 30) in the morning and to reapply when outside  o Dry Eyes: Use saline eye drops or artificial tears  o Dry Nose/Nosebleeds: Use saline nasal spray (ex  Ayr) during the day and at bedtime  To stop nosebleeds, hold pressure    If this does not work, call your dermatologist     o Chapped lips: apply petrolatum-based lip balms routinely  Avoid anything medicated   Contact your dermatologist for excessive dryness, cracks, tenderness or pain   Increased blood fats and cholesterol (usually in patients with a personal or family history of cholesterol or triglyceride problems)   Vision problems affecting your ability to see in the dark and drive at night   Bone, muscle and tendon aches can occur  Additional stretching before and after activities may help relieve aches  If you are otherwise healthy, consider the use of ibuprofen or naproxen  If you are unsure if you can use these pain medications, ask your doctor first  Also, call your doctor if you experience severe back pain, joint pain, or a broken bone    Changes in mood, including anxiety, depressive symptoms or suicidal thoughts which may or may not be temporary  Notify your doctor if your or a family member have suffered from these conditions or if you have any concerns during treatment   Serious birth defects or miscarriage can occur while taking this medication and for one month after taking your last dose of isotretinoin  You must not get pregnant while taking isotretinoin  Once the medication is out of your system, 30 days, there is no effect on the baby   Increased pressure in the brain  Call your doctor right away if you experience bad headache, blurred vision, dizziness, nausea or vomiting, or seizures   Skin rash - call your doctor right away if you develop any rashes or blisters on the skin   Liver damage - call your doctor right away if you experience severe stomach, chest or bowel pain, painful swallowing, diarrhea, blood in your stool, yellowing of your skin or eyes, or dark urine   Gastrointestinal bleeding  If you experience unusual abdominal pain or red or black/tarry stools, call your doctor immediately   You should also notify your doctor if you or a family member has a history of ulcerative colitis or Crohns disease   Worsening acne  Mild worsening of acne can occur in the first few weeks of using isotreinoin  If your acne is getting significantly worse, call your doctor  This may require temporarily stopping the isotretinoin and possibly adding other medications  XEROSIS ("DRY SKIN")    Dry skin refers to skin that feels dry to touch  Dry skin has a dull surface with a rough, scaly quality  The skin is less pliable and cracked  When dryness is severe, the skin may become inflamed and fissured  Although any body site can be dry, dry skin tends to affect the shins more than any other site  Dry skin is lacking moisture in the outer horny cell layer (stratum corneum) and this results in cracks in the skin surface  Dry skin is also called xerosis, xeroderma or asteatosis (lack of fat)  It can affect males and females of all ages  There is some racial variability in water and lipid content of the skin   Dry skin that starts in early childhood may be one of about 20 types of ichthyosis (fish-scale skin)  There is often a family history of dry skin   Dry skin is commonly seen in people with atopic dermatitis   Nearly everyone > 60 years has dry skin  Dry skin that begins later may be seen in people with certain diseases and conditions   Postmenopausal women   Hypothyroidism   Chronic renal disease    Malnutrition and weight loss    Subclinical dermatitis    Treatment with certain drugs such as oral retinoids, diuretics and epidermal growth factor receptor inhibitors    People exposed to a dry environment may experience dry skin     Low humidity: in desert climates or cool, windy conditions    Excessive air conditioning    Direct heat from a fire or fan heater    Excessive bathing    Contact with soap, detergents and solvents    Inappropriate topical agents such as alcohol    Frictional irritation from rough clothing or abrasives    Dry skin is due to abnormalities in the integrity of the barrier function of the stratum corneum, which is made up of corneocytes   There is an overall reduction in the lipids in the stratum corneum   Ratio of ceramides, cholesterol and free fatty acids may be normal or altered   There may be a reduction in the proliferation of keratinocytes   Keratinocyte subtypes change in dry skin with a decrease in keratins K1, K10 and increase in K5, K14     Involucrin (a protein) may be expressed early, increasing cell stiffness   The result is retention of corneocytes and reduced water-holding capacity  What is the treatment for dry skin? The mainstay of treatment of dry skin and ichthyosis is moisturisers/emollients  They should be applied liberally and often enough to:   Reduce itch    Improve the barrier function    Prevent entry of irritants, bacteria    Reduce transepidermal water loss  When considering which emollient is most suitable, consider:   Severity of the dryness    Tolerance    Personal preference    Cost and availability  Emollients generally work best if applied to damp skin, if pH is below 7 (acidic), and if containing humectants such as urea or propylene glycol  Additional treatments include:   Topical steroid if itchy or there is dermatitis -- choose an emollient base    Topical calcineurin inhibitors if topical steroids are unsuitable  How can dry skin be prevented? Eliminate aggravating factors:   Reduce the frequency of bathing   A humidifier in winter and air conditioner in summer    Compare having a short shower with a prolonged soak in a bath   Use lukewarm, not hot, water   Replace standard soap with a substitute such as a synthetic detergent cleanser, water-miscible emollient, bath oil, anti-pruritic tar oil, colloidal oatmeal etc     Apply an emollient liberally and often, particularly shortly after bathing, and when itchy   The drier the skin, the thicker this should be, especially on the hands     What is the outlook for dry skin? A tendency to dry skin may persist life-long, or it may improve once contributing factors are controlled      SKIN CARE RECOMMENDATIONS     Neutrogena Extra Dry Skin Hydroboost  Bo Posay Hyaluronic Acid Serum  Cerave Nightly Moisturizer

## 2021-04-05 NOTE — PROGRESS NOTES
[facility] Follow Up Note    Patient Name: Eliecer Burns  Encounter Date: 4/5/2021    Today's Chief Concerns:  Nishi Clunes Concern #1:  Accutane Follow Up  Nishi Clunes Concern #2:    Nishi Clunes Concern #3:      Current Medications:    Current Outpatient Medications:     cetirizine (ZyrTEC) 10 mg tablet, Take 10 mg by mouth daily, Disp: , Rfl:     Iron-Vitamin C (IRON 100/C PO), Take by mouth, Disp: , Rfl:     ISOtretinoin (ACCUTANE) 20 MG capsule, IPLEDGE NUMBER: [id number] Take 2 pills by mouth daily  , Disp: 60 capsule, Rfl: 0    Multiple Vitamins-Minerals (multivitamin with minerals) tablet, Take 1 tablet by mouth daily, Disp: , Rfl:     norgestimate-ethinyl estradiol (ORTHO-CYCLEN) 0 25-35 MG-MCG per tablet, Take one pill by mouth daily  , Disp: 30 tablet, Rfl: 3    valACYclovir (VALTREX) 500 mg tablet, Take 500 mg by mouth 2 (two) times a day, Disp: , Rfl:     vitamin B-12 (CYANOCOBALAMIN) 100 MCG TABS, Take 50 mcg by mouth daily, Disp: , Rfl:     VITAMIN D PO, Take by mouth, Disp: , Rfl:     metoclopramide (Reglan) 10 mg tablet, , Disp: , Rfl:     Omega-3 Fatty Acids (fish oil) 1,000 mg, Take 1,000 mg by mouth daily, Disp: , Rfl:     Prenatal Vit-Fe Fumarate-FA (prenatal vitamin) 28-0 8 mg, Take 1 tablet by mouth daily, Disp: 100 tablet, Rfl: 3    spironolactone (ALDACTONE) 25 mg tablet, Take 1 tablet (25 mg total) by mouth daily (Patient not taking: Reported on 3/1/2021), Disp: 30 tablet, Rfl: 2    CONSTITUTIONAL:   Vitals:    04/05/21 0853   Temp: 98 2 °F (36 8 °C)   Weight: 62 1 kg (137 lb)   Height: 5' 2" (1 575 m)       Specific Alerts:    Have you been seen by a [facility] Dermatologist in the last 3 years? YES    Are you pregnant or planning to become pregnant? No    Are you currently or planning to be nursing or breast feeding? No    No Known Allergies    May we call your Preferred Phone number to discuss your specific medical information?  YES    May we leave a detailed message that includes your specific medical information? YES    Have you traveled outside of the [facility] in the past 3 months? No    Do you currently have a pacemaker or defibrillator? No    Do you have any artificial heart valves, joints, plates, screws, rods, stents, pins, etc? No   - If Yes, were any placed within the last 2 years? Do you require any medications prior to a surgical procedure? No   - If Yes, for which procedure? - If Yes, what medications to you require? Are you taking any medications that cause you to bleed more easily ("blood thinners") No    Have you ever experienced a rapid heartbeat with epinephrine? No    Have you ever been treated with "gold" (gold sodium thiomalate) therapy? No    [facility] with wrinkles, "laugh lines," facial volume loss, "double chin," "love handles," age spots, and more  Are you interested in learning today about some of the skin enhancement procedures that we offer? (If Yes, please provide more detail) No    Review of Systems:  Have you recently had or currently have any of the following?     · Fever or chills: No  · Night Sweats: No  · Headaches: No  · Weight Gain: No  · Weight Loss: No  · Blurry Vision: No  · Nausea: No  · Vomiting: No  · Diarrhea: No  · Blood in Stool: No  · Abdominal Pain: No  · Itchy Skin: No  · Painful Joints: No  · Swollen Joints: No  · Muscle Pain: No  · Irregular Mole: No  · Sun Burn: No  · Dry Skin: No  · Skin Color Changes: No  · Scar or Keloid: No  · Cold Sores/Fever Blisters: No  · Bacterial Infections/MRSA: No  · Anxiety: No  · Depression: No  · Suicidal or Homicidal Thoughts: No      PSYCH: Normal mood and affect  EYES: Normal conjunctiva  ENT: Normal lips and oral mucosa  CARDIOVASCULAR: No edema  RESPIRATORY: Normal respirations  HEME/LYMPH/IMMUNO:  No regional lymphadenopathy except as noted below in ASSESSMENT AND PLAN BY DIAGNOSIS    FULL ORGAN SYSTEM SKIN EXAM (SKIN)   Hair, Scalp, Ears, Face Normal except as noted below in Assessment       ISOTRETINOIN "ACCUTANE": FEMALE    Age: 33 y o  Weight (in KILOgrams): 62 1  Pulse: 72  Blood Pressure: 122/78    Ipledge number: [id number]  Last 4 digits SS: [id number]    Contraception Type 1: hormonal combination oral contraceptive pill  Contraception Type 2: male latex condom  Patient reminded that this must be listed in same order on iPledge  Yes       "Goal Dose" in mg (125 mg x weight in kg): 7,762 5 mg    Interim month   "Daily Dose" of Isotretinoin the patient has actually been taking since last visit (this is usually what was prescribed): 40 mg   "Total # of Days" patient took Isotretinoin since last visit (this is usually 30):  30 days   "Total Monthly Dose" in mg since last visit (this equals "Daily Dose" x "Total # of Days"): 1200 mg    Cumulative dosage   "Total cumulative Dose" in mg (add the above "Total Monthly Dose" with "Total Cumulative Dose" from last visit: 1200 mg        Symptoms   Conjunctivitis: No   Night Blindness/ Issues with night vision: No   Focusing Problems: No   Dry Lips/Eyes: No   Dry Skin: No   Rash: No   Nose Bleeds: No   Angular cheilitis (cracking in corner of lips): No   Headaches: No   Photosensitivity: No   Dry Anus: No   Depression: No   Mood Changes: No   Suicidal thoughts: No   Fatigue: No   Weight Loss: No   Muscle Pain/Stiffness: No   Abdominal pain: No   Diarrhea: No   Bloody stools: No    Physical Exam   Psychiatric/Mood: Normal   Anatomic Location Affected:   Face   Morphological Description:  o Open/Closed Comedones:  - Few ("Mild")  o Inflammatory Papules/Pustules:  - Few ("Mild")  o Nodules:  - Few ("Mild")  o Scarring:  - Several ("Moderate")  o Excoriations:  - Rare ("Almost Clear")  o Local Skin Redness/Erythema:  - Few ("Mild")  o Local Skin Dryness/Scaling:  - Few ("Mild")  o Local Skin Dyspigmentation:  - Several ("Moderate")   Pertinent Positives:   Pertinent Negatives:    Labs   Date of last labs: 4/2/2021   Completed: YES   Labs reviewed: within normal limits   Pregnancy test: serum quantitative  - Result: negative  - Interpretation- pregnant: No      Assessment and Plan:    1  Acne Vulgaris  2  High Risk Medication  3  Medication Monitoring  4  Xerosis (see below for patient education)     Target Total Dose per KILOgram:  125 mg/KILOgram (this may change this on a patient-by-patient basis)   Treatment month: 2nd    Planned daily dose for next 30 days: 80 mg/day (40 mg twice daily, increased from 20 mg BID at 1st month)   I-pledge number (please remember to add patient's "Ipledge number" to actual prescription):  [id number]   Return to clinic in about 1 month, please review ipledge guidelines in terms of timing (see below for patient education)   Get labs 1-2 days before next appointment: YES   Patient confirmed in iPledge: YES   Patients counseled:  - Cannot donate blood while taking isotretinoin and for 1 month after completing therapy  YES  - Do not share medication with anyone  YES  - Possible side effects discussed, including sun sensitivity, dry lips/eyes/skin, headaches, blurry vision (pseudotumor cerebri), muscle/joint aches, GI upset, bloody stools (IBD including Crohns/Ulcerative Colitis), jaundice, liver dysfunction, lipid abnormalities, bone marrow dysfunction, mood effects, thoughts of hurting oneself or others, and flaring of acne (acne fulminans/SAPPHO)  YES  - Females cannot get pregnant and must be on two forms of birth control while on therapy and at least one month after  YES  - Report any side effects of mood swings or depression and stop medication upon occurrence  YES  - Patient needs to confirm counseling in iPledge prior to picking up prescription  YES      PATIENT INFORMATION    Isotretinoin for Acne   Isotretinoin is a retinoid medication that is taken by mouth to treat severe nodular acne   Typically, it is used once other acne treatments have not worked, such as oral antibiotics  Usually isotretinoin is taken for 4 to 6 months, although the length of treatment can vary from person to person  While most patients acne improves and may even clear with this medication, in 20% of patients acne can come back  This requires additional acne treatment or even a second cycle of isotretinoin  How should I take isotretinoin?  Isotretinoin dosing is weight-based and should be taken exactly as prescribed   If you miss a dose, skip that dose  Do not take 2 doses at the same time   Take with food to help with absorption   All instructions in the iPLEDGE program packet (www [employer]) that was provided must be followed (see below for highlights)   You will get a 30 day supply of isotretinoin at a time  It cannot be automatically refilled  Make certain that you have been given enough medication to last 30 days as pharmacists are unable to refill or make changes within a month   You must return to your dermatologist every month to make sure you are not having any serious side effects from isotretinoin  For female patients, this visit will always include a monthly pregnancy test  Other laboratory studies, including liver function tests, cholesterol and triglycerides, must also be conducted before and during treatment  What should I avoid while taking isotretinoin?  Do not get pregnant from one month prior or 1 month following taking any isotretinoin   Do not donate blood while take isotretinoin or until 1 months after coming off the medication   Do not have cosmetic procedures to smooth your skin, including waxing, dermabrasion, or laser procedures, while taking this medication and for at least 6 months after you stop   Do not share isotretinoin with any other people  It can cause birth defects and other serious health problems      Do not use any other acne medications, including medicated washes, cleansers, creams or antibiotic pills during your treatment with isotretinoin unless expressly directed to by your dermatologist      Initiating isotretinoin & the iPLEDGE Program    The [facility] Program is a strict, government-required program to prevent females from becoming pregnant while on isotretinoin  All females and males must participate  Note: Your provider must follow this program and cannot change any of the requirements   Before starting isotretinoin, your provider will talk to you about the safe use of this medication and you will need to sign consent forms in order to receive treatment   If you fail to keep appointments, you will be unable to get your prescription filled  Andrea Mora For females of childbearing age:      o Treva Albert must be on two specific forms of birth control before starting isotretinoin  Your provider must get 2 negative pregnancy tests, at least 30 days apart, before you can proceed with the medication  The second pregnancy test must be obtained within 5 days of the menstrual cycle  If you choose not to be sexually active during treatment, you still must have the 2 negative pregnancy tests    o You must answer a series of questions either online or by phone every month prior to getting the prescription  o Monthly prescriptions must be filled within 7 days of that month's pregnancy test   It is important that you notify your physician well before the 7th day if there are any unforeseen delays, such as prior authorizations  o For more information, visit: [email or web address]    What are the possible side effects of isotretinoin? What should I do? Most side effects from isotretinoin are mild and can be easily improved with simple remedies  Others are more concerning   Dry skin and eyes, chapped lips and dry nose that may lead to nosebleeds  o Dry Skin: Apply sensitive skin moisturizers to dry skin at least two times a day   You may need sunscreen (SPF 30) in the morning and to reapply when outside  o Dry Eyes: Use saline eye drops or artificial tears  o Dry Nose/Nosebleeds: Use saline nasal spray (ex  Ayr) during the day and at bedtime  To stop nosebleeds, hold pressure  If this does not work, call your dermatologist     o Chapped lips: apply petrolatum-based lip balms routinely  Avoid anything medicated   Contact your dermatologist for excessive dryness, cracks, tenderness or pain   Increased blood fats and cholesterol (usually in patients with a personal or family history of cholesterol or triglyceride problems)   Vision problems affecting your ability to see in the dark and drive at night   Bone, muscle and tendon aches can occur  Additional stretching before and after activities may help relieve aches  If you are otherwise healthy, consider the use of ibuprofen or naproxen  If you are unsure if you can use these pain medications, ask your doctor first  Also, call your doctor if you experience severe back pain, joint pain, or a broken bone    Changes in mood, including anxiety, depressive symptoms or suicidal thoughts which may or may not be temporary  Notify your doctor if your or a family member have suffered from these conditions or if you have any concerns during treatment   Serious birth defects or miscarriage can occur while taking this medication and for one month after taking your last dose of isotretinoin  You must not get pregnant while taking isotretinoin  Once the medication is out of your system, 30 days, there is no effect on the baby   Increased pressure in the brain  Call your doctor right away if you experience bad headache, blurred vision, dizziness, nausea or vomiting, or seizures   Skin rash - call your doctor right away if you develop any rashes or blisters on the skin      Liver damage - call your doctor right away if you experience severe stomach, chest or bowel pain, painful swallowing, diarrhea, blood in your stool, yellowing of your skin or eyes, or dark urine   Gastrointestinal bleeding  If you experience unusual abdominal pain or red or black/tarry stools, call your doctor immediately  You should also notify your doctor if you or a family member has a history of ulcerative colitis or Crohns disease   Worsening acne  Mild worsening of acne can occur in the first few weeks of using isotreinoin  If your acne is getting significantly worse, call your doctor  This may require temporarily stopping the isotretinoin and possibly adding other medications  XEROSIS ("DRY SKIN")    Dry skin refers to skin that feels dry to touch  Dry skin has a dull surface with a rough, scaly quality  The skin is less pliable and cracked  When dryness is severe, the skin may become inflamed and fissured  Although any body site can be dry, dry skin tends to affect the shins more than any other site  Dry skin is lacking moisture in the outer horny cell layer (stratum corneum) and this results in cracks in the skin surface  Dry skin is also called xerosis, xeroderma or asteatosis (lack of fat)  It can affect males and females of all ages  There is some racial variability in water and lipid content of the skin   Dry skin that starts in early childhood may be one of about 20 types of ichthyosis (fish-scale skin)  There is often a family history of dry skin   Dry skin is commonly seen in people with atopic dermatitis   Nearly everyone > 60 years has dry skin  Dry skin that begins later may be seen in people with certain diseases and conditions   Postmenopausal women   Hypothyroidism   Chronic renal disease    Malnutrition and weight loss    Subclinical dermatitis    Treatment with certain drugs such as oral retinoids, diuretics and epidermal growth factor receptor inhibitors    People exposed to a dry environment may experience dry skin     Low humidity: in desert climates or cool, windy conditions    Excessive air conditioning    Direct heat from a fire or fan heater    Excessive bathing    Contact with soap, detergents and solvents    Inappropriate topical agents such as alcohol    Frictional irritation from rough clothing or abrasives    Dry skin is due to abnormalities in the integrity of the barrier function of the stratum corneum, which is made up of corneocytes   There is an overall reduction in the lipids in the stratum corneum   Ratio of ceramides, cholesterol and free fatty acids may be normal or altered   There may be a reduction in the proliferation of keratinocytes   Keratinocyte subtypes change in dry skin with a decrease in keratins K1, K10 and increase in K5, K14     Involucrin (a protein) may be expressed early, increasing cell stiffness   The result is retention of corneocytes and reduced water-holding capacity  What is the treatment for dry skin? The mainstay of treatment of dry skin and ichthyosis is moisturisers/emollients  They should be applied liberally and often enough to:   Reduce itch    Improve the barrier function    Prevent entry of irritants, bacteria    Reduce transepidermal water loss  When considering which emollient is most suitable, consider:   Severity of the dryness    Tolerance    Personal preference    Cost and availability  Emollients generally work best if applied to damp skin, if pH is below 7 (acidic), and if containing humectants such as urea or propylene glycol  Additional treatments include:   Topical steroid if itchy or there is dermatitis -- choose an emollient base    Topical calcineurin inhibitors if topical steroids are unsuitable  How can dry skin be prevented? Eliminate aggravating factors:   Reduce the frequency of bathing   A humidifier in winter and air conditioner in summer    Compare having a short shower with a prolonged soak in a bath   Use lukewarm, not hot, water      Replace standard soap with a substitute such as a synthetic detergent cleanser, water-miscible emollient, bath oil, anti-pruritic tar oil, colloidal oatmeal etc     Apply an emollient liberally and often, particularly shortly after bathing, and when itchy  The drier the skin, the thicker this should be, especially on the hands  What is the outlook for dry skin? A tendency to dry skin may persist life-long, or it may improve once contributing factors are controlled          Scribe Attestation    I,:  Margarito Wills am acting as a scribe while in the presence of the attending physician :       I,:  Beau Joya MD personally performed the services described in this documentation    as scribed in my presence :

## 2021-04-09 ENCOUNTER — TELEPHONE (OUTPATIENT)
Dept: DERMATOLOGY | Facility: CLINIC | Age: 31
End: 2021-04-09

## 2021-04-26 ENCOUNTER — OFFICE VISIT (OUTPATIENT)
Dept: DERMATOLOGY | Facility: CLINIC | Age: 31
End: 2021-04-26
Payer: COMMERCIAL

## 2021-04-26 ENCOUNTER — LAB (OUTPATIENT)
Dept: LAB | Facility: CLINIC | Age: 31
End: 2021-04-26
Payer: COMMERCIAL

## 2021-04-26 ENCOUNTER — TRANSCRIBE ORDERS (OUTPATIENT)
Dept: LAB | Facility: CLINIC | Age: 31
End: 2021-04-26

## 2021-04-26 ENCOUNTER — TELEPHONE (OUTPATIENT)
Dept: DERMATOLOGY | Facility: CLINIC | Age: 31
End: 2021-04-26

## 2021-04-26 VITALS — WEIGHT: 138 LBS | HEIGHT: 62 IN | TEMPERATURE: 98.3 F | BODY MASS INDEX: 25.4 KG/M2

## 2021-04-26 DIAGNOSIS — L85.3 XEROSIS CUTIS: ICD-10-CM

## 2021-04-26 DIAGNOSIS — L70.0 ACNE VULGARIS: ICD-10-CM

## 2021-04-26 DIAGNOSIS — L70.0 ACNE VULGARIS: Primary | ICD-10-CM

## 2021-04-26 LAB
ALBUMIN SERPL BCP-MCNC: 3.4 G/DL (ref 3.5–5)
ALP SERPL-CCNC: 71 U/L (ref 46–116)
ALT SERPL W P-5'-P-CCNC: 18 U/L (ref 12–78)
AST SERPL W P-5'-P-CCNC: 15 U/L (ref 5–45)
BILIRUB DIRECT SERPL-MCNC: 0.04 MG/DL (ref 0–0.2)
BILIRUB SERPL-MCNC: 0.22 MG/DL (ref 0.2–1)
CHOLEST SERPL-MCNC: 226 MG/DL (ref 50–200)
HCG SERPL QL: NEGATIVE
HDLC SERPL-MCNC: 87 MG/DL
LDLC SERPL CALC-MCNC: 109 MG/DL (ref 0–100)
NONHDLC SERPL-MCNC: 139 MG/DL
PROT SERPL-MCNC: 7.3 G/DL (ref 6.4–8.2)
TRIGL SERPL-MCNC: 149 MG/DL

## 2021-04-26 PROCEDURE — 80076 HEPATIC FUNCTION PANEL: CPT

## 2021-04-26 PROCEDURE — 3008F BODY MASS INDEX DOCD: CPT | Performed by: DERMATOLOGY

## 2021-04-26 PROCEDURE — 84703 CHORIONIC GONADOTROPIN ASSAY: CPT

## 2021-04-26 PROCEDURE — 80061 LIPID PANEL: CPT

## 2021-04-26 PROCEDURE — 1036F TOBACCO NON-USER: CPT | Performed by: DERMATOLOGY

## 2021-04-26 PROCEDURE — 99214 OFFICE O/P EST MOD 30 MIN: CPT | Performed by: DERMATOLOGY

## 2021-04-26 PROCEDURE — 36415 COLL VENOUS BLD VENIPUNCTURE: CPT

## 2021-04-26 RX ORDER — ISOTRETINOIN 30 MG/1
CAPSULE ORAL
Qty: 60 CAPSULE | Refills: 0 | Status: SHIPPED | OUTPATIENT
Start: 2021-04-26 | End: 2021-05-27

## 2021-04-26 NOTE — PATIENT INSTRUCTIONS
ISOTRETINOIN "ACCUTANE": FEMALE:     Acne Vulgaris   High Risk Medication   Medication Monitoring   Xerosis (see below for patient education)    Assessment and Plan:   Target Total Dose per KILOgram:  125 mg/KILOgram (this may change this on a patient-by-patient basis)   Planned daily dose for next 30 days: 60 mg    (please remember to add patient's "Ipledge number" to actual prescription):  [id number]   Return to clinic in about 1 month, please review ipledge guidelines in terms of timing (see below for patient education)   Get labs 1-2 days before next appointment: YES   Patient confirmed in iPledge: YES   Patients counseled:  - Cannot donate blood while taking isotretinoin and for 1 month after completing therapy  YES  - Do not share medication with anyone  YES  - Possible side effects discussed, including sun sensitivity, dry lips/eyes/skin, headaches, blurry vision (pseudotumor cerebri), muscle/joint aches, GI upset, bloody stools (IBD including Crohns/Ulcerative Colitis), jaundice, liver dysfunction, lipid abnormalities, bone marrow dysfunction, mood effects, thoughts of hurting oneself or others, and flaring of acne (acne fulminans/SAPPHO)  YES  - Females cannot get pregnant and must be on two forms of birth control while on therapy and at least one month after  YES  - Report any side effects of mood swings or depression and stop medication upon occurrence  YES  - Patient needs to confirm counseling in iPledge prior to picking up prescription  YES    PLEASE DO HOME PREGNANCY TEST AND SHOW ON CAMERA AT VIRTUAL VISIT       Isotretinoin for Acne   Isotretinoin is a retinoid medication that is taken by mouth to treat severe nodular acne  Typically, it is used once other acne treatments have not worked, such as oral antibiotics  Usually isotretinoin is taken for 4 to 6 months, although the length of treatment can vary from person to person    While most patients acne improves and may even clear with this medication, in 20% of patients acne can come back  This requires additional acne treatment or even a second cycle of isotretinoin  How should I take isotretinoin?  Isotretinoin dosing is weight-based and should be taken exactly as prescribed   If you miss a dose, skip that dose  Do not take 2 doses at the same time   Take with food to help with absorption   All instructions in the iPLEDGE program packet (www [employer]) that was provided must be followed (see below for highlights)   You will get a 30 day supply of isotretinoin at a time  It cannot be automatically refilled  Make certain that you have been given enough medication to last 30 days as pharmacists are unable to refill or make changes within a month   You must return to your dermatologist every month to make sure you are not having any serious side effects from isotretinoin  For female patients, this visit will always include a monthly pregnancy test  Other laboratory studies, including liver function tests, cholesterol and triglycerides, must also be conducted before and during treatment  What should I avoid while taking isotretinoin?  Do not get pregnant from one month prior or 1 month following taking any isotretinoin   Do not donate blood while take isotretinoin or until 1 months after coming off the medication   Do not have cosmetic procedures to smooth your skin, including waxing, dermabrasion, or laser procedures, while taking this medication and for at least 6 months after you stop   Do not share isotretinoin with any other people  It can cause birth defects and other serious health problems      Do not use any other acne medications, including medicated washes, cleansers, creams or antibiotic pills during your treatment with isotretinoin unless expressly directed to by your dermatologist      Initiating isotretinoin & the [place] [facility] Program is a strict, government-required program to prevent females from becoming pregnant while on isotretinoin  All females and males must participate  Note: Your provider must follow this program and cannot change any of the requirements   Before starting isotretinoin, your provider will talk to you about the safe use of this medication and you will need to sign consent forms in order to receive treatment   If you fail to keep appointments, you will be unable to get your prescription filled  Butch Goddard For females of childbearing age:      o Telma Asa must be on two specific forms of birth control before starting isotretinoin  Your provider must get 2 negative pregnancy tests, at least 30 days apart, before you can proceed with the medication  The second pregnancy test must be obtained within 5 days of the menstrual cycle  If you choose not to be sexually active during treatment, you still must have the 2 negative pregnancy tests    o You must answer a series of questions either online or by phone every month prior to getting the prescription  o Monthly prescriptions must be filled within 7 days of that month's pregnancy test   It is important that you notify your physician well before the 7th day if there are any unforeseen delays, such as prior authorizations  o For more information, visit: [email or web address]    What are the possible side effects of isotretinoin? What should I do? Most side effects from isotretinoin are mild and can be easily improved with simple remedies  Others are more concerning   Dry skin and eyes, chapped lips and dry nose that may lead to nosebleeds  o Dry Skin: Apply sensitive skin moisturizers to dry skin at least two times a day  You may need sunscreen (SPF 30) in the morning and to reapply when outside  o Dry Eyes: Use saline eye drops or artificial tears  o Dry Nose/Nosebleeds: Use saline nasal spray (ex  Ayr) during the day and at bedtime  To stop nosebleeds, hold pressure  If this does not work, call your dermatologist     o Chapped lips: apply petrolatum-based lip balms routinely  Avoid anything medicated   Contact your dermatologist for excessive dryness, cracks, tenderness or pain   Increased blood fats and cholesterol (usually in patients with a personal or family history of cholesterol or triglyceride problems)   Vision problems affecting your ability to see in the dark and drive at night   Bone, muscle and tendon aches can occur  Additional stretching before and after activities may help relieve aches  If you are otherwise healthy, consider the use of ibuprofen or naproxen  If you are unsure if you can use these pain medications, ask your doctor first  Also, call your doctor if you experience severe back pain, joint pain, or a broken bone    Changes in mood, including anxiety, depressive symptoms or suicidal thoughts which may or may not be temporary  Notify your doctor if your or a family member have suffered from these conditions or if you have any concerns during treatment   Serious birth defects or miscarriage can occur while taking this medication and for one month after taking your last dose of isotretinoin  You must not get pregnant while taking isotretinoin  Once the medication is out of your system, 30 days, there is no effect on the baby   Increased pressure in the brain  Call your doctor right away if you experience bad headache, blurred vision, dizziness, nausea or vomiting, or seizures   Skin rash - call your doctor right away if you develop any rashes or blisters on the skin   Liver damage - call your doctor right away if you experience severe stomach, chest or bowel pain, painful swallowing, diarrhea, blood in your stool, yellowing of your skin or eyes, or dark urine   Gastrointestinal bleeding  If you experience unusual abdominal pain or red or black/tarry stools, call your doctor immediately   You should also notify your doctor if you or a family member has a history of ulcerative colitis or Crohns disease   Worsening acne  Mild worsening of acne can occur in the first few weeks of using isotreinoin  If your acne is getting significantly worse, call your doctor  This may require temporarily stopping the isotretinoin and possibly adding other medications  XEROSIS ("DRY SKIN")    Dry skin refers to skin that feels dry to touch  Dry skin has a dull surface with a rough, scaly quality  The skin is less pliable and cracked  When dryness is severe, the skin may become inflamed and fissured  Although any body site can be dry, dry skin tends to affect the shins more than any other site  Dry skin is lacking moisture in the outer horny cell layer (stratum corneum) and this results in cracks in the skin surface  Dry skin is also called xerosis, xeroderma or asteatosis (lack of fat)  It can affect males and females of all ages  There is some racial variability in water and lipid content of the skin   Dry skin that starts in early childhood may be one of about 20 types of ichthyosis (fish-scale skin)  There is often a family history of dry skin   Dry skin is commonly seen in people with atopic dermatitis   Nearly everyone > 60 years has dry skin  Dry skin that begins later may be seen in people with certain diseases and conditions   Postmenopausal women   Hypothyroidism   Chronic renal disease    Malnutrition and weight loss    Subclinical dermatitis    Treatment with certain drugs such as oral retinoids, diuretics and epidermal growth factor receptor inhibitors    People exposed to a dry environment may experience dry skin     Low humidity: in desert climates or cool, windy conditions    Excessive air conditioning    Direct heat from a fire or fan heater    Excessive bathing    Contact with soap, detergents and solvents    Inappropriate topical agents such as alcohol    Frictional irritation from rough clothing or abrasives    Dry skin is due to abnormalities in the integrity of the barrier function of the stratum corneum, which is made up of corneocytes   There is an overall reduction in the lipids in the stratum corneum   Ratio of ceramides, cholesterol and free fatty acids may be normal or altered   There may be a reduction in the proliferation of keratinocytes   Keratinocyte subtypes change in dry skin with a decrease in keratins K1, K10 and increase in K5, K14     Involucrin (a protein) may be expressed early, increasing cell stiffness   The result is retention of corneocytes and reduced water-holding capacity  What is the treatment for dry skin? The mainstay of treatment of dry skin and ichthyosis is moisturisers/emollients  They should be applied liberally and often enough to:   Reduce itch    Improve the barrier function    Prevent entry of irritants, bacteria    Reduce transepidermal water loss  When considering which emollient is most suitable, consider:   Severity of the dryness    Tolerance    Personal preference    Cost and availability  Emollients generally work best if applied to damp skin, if pH is below 7 (acidic), and if containing humectants such as urea or propylene glycol  Additional treatments include:   Topical steroid if itchy or there is dermatitis -- choose an emollient base    Topical calcineurin inhibitors if topical steroids are unsuitable  How can dry skin be prevented? Eliminate aggravating factors:   Reduce the frequency of bathing   A humidifier in winter and air conditioner in summer    Compare having a short shower with a prolonged soak in a bath   Use lukewarm, not hot, water   Replace standard soap with a substitute such as a synthetic detergent cleanser, water-miscible emollient, bath oil, anti-pruritic tar oil, colloidal oatmeal etc     Apply an emollient liberally and often, particularly shortly after bathing, and when itchy   The drier the skin, the thicker this should be, especially on the hands  What is the outlook for dry skin? A tendency to dry skin may persist life-long, or it may improve once contributing factors are controlled

## 2021-04-26 NOTE — PROGRESS NOTES
[facility] Follow Up Note    Patient Name: Anita Meléndez  Encounter Date: 04/26/2021    Today's Chief Concerns:  Zuly Wray Concern #1:  Accutane Follow up   Current Medications:    Current Outpatient Medications:     cetirizine (ZyrTEC) 10 mg tablet, Take 10 mg by mouth daily, Disp: , Rfl:     Iron-Vitamin C (IRON 100/C PO), Take by mouth, Disp: , Rfl:     ISOtretinoin (ACCUTANE) 40 MG capsule, Take 1 capsule (40 mg total) by mouth 2 (two) times a day IPledge: [id number], Disp: 60 capsule, Rfl: 0    Multiple Vitamins-Minerals (multivitamin with minerals) tablet, Take 1 tablet by mouth daily, Disp: , Rfl:     norgestimate-ethinyl estradiol (ORTHO-CYCLEN) 0 25-35 MG-MCG per tablet, Take one pill by mouth daily  , Disp: 30 tablet, Rfl: 3    Omega-3 Fatty Acids (fish oil) 1,000 mg, Take 1,000 mg by mouth daily, Disp: , Rfl:     valACYclovir (VALTREX) 500 mg tablet, Take 500 mg by mouth 2 (two) times a day, Disp: , Rfl:     vitamin B-12 (CYANOCOBALAMIN) 100 MCG TABS, Take 50 mcg by mouth daily, Disp: , Rfl:     VITAMIN D PO, Take by mouth, Disp: , Rfl:     ISOtretinoin (ACCUTANE) 20 MG capsule, IPLEDGE NUMBER: [id number] Take 2 pills by mouth daily  (Patient not taking: Reported on 4/26/2021), Disp: 60 capsule, Rfl: 0    metoclopramide (Reglan) 10 mg tablet, , Disp: , Rfl:     Prenatal Vit-Fe Fumarate-FA (prenatal vitamin) 28-0 8 mg, Take 1 tablet by mouth daily, Disp: 100 tablet, Rfl: 3    spironolactone (ALDACTONE) 25 mg tablet, Take 1 tablet (25 mg total) by mouth daily (Patient not taking: Reported on 3/1/2021), Disp: 30 tablet, Rfl: 2    CONSTITUTIONAL:   Vitals:    04/26/21 1301   Temp: 98 3 °F (36 8 °C)   TempSrc: Tympanic   Weight: 62 6 kg (138 lb)   Height: 5' 2" (1 575 m)           Specific Alerts:    Have you been seen by a [facility] in the last 3 years? YES    Are you pregnant or planning to become pregnant?  No    Are you currently or planning to be nursing or breast feeding? No    No Known Allergies    May we call your Preferred Phone number to discuss your specific medical information? YES    May we leave a detailed message that includes your specific medical information? YES    Have you traveled outside of the [facility] in the past 3 months? No    Do you currently have a pacemaker or defibrillator? No    Do you have any artificial heart valves, joints, plates, screws, rods, stents, pins, etc? No   - If Yes, were any placed within the last 2 years? Do you require any medications prior to a surgical procedure? No       Are you taking any medications that cause you to bleed more easily ("blood thinners") No    Have you ever experienced a rapid heartbeat with epinephrine? No    Have you ever been treated with "gold" (gold sodium thiomalate) therapy? No    [facility] can help with wrinkles, "laugh lines," facial volume loss, "double chin," "love handles," age spots, and more  Are you interested in learning today about some of the skin enhancement procedures that we offer? (If Yes, please provide more detail) No    Review of Systems:  Have you recently had or currently have any of the following?     · Fever or chills: No  · Night Sweats: No  · Headaches: No  · Weight Gain: No  · Weight Loss: No  · Blurry Vision: No  · Nausea: No  · Vomiting: No  · Diarrhea: No  · Blood in Stool: No  · Abdominal Pain: No  · Itchy Skin: No  · Painful Joints: YES  · Swollen Joints: No  · Muscle Pain: No  · Irregular Mole: No  · Sun Burn: No  · Dry Skin: YES  · Skin Color Changes: No  · Scar or Keloid: No  · Cold Sores/Fever Blisters: No  · Bacterial Infections/MRSA: No  · Anxiety: No  · Depression: No  · Suicidal or Homicidal Thoughts: No      PSYCH: Normal mood and affect  EYES: Normal conjunctiva  ENT: Normal lips and oral mucosa  CARDIOVASCULAR: No edema  RESPIRATORY: Normal respirations  HEME/LYMPH/IMMUNO:  No regional lymphadenopathy except as noted below in ASSESSMENT AND PLAN BY DIAGNOSIS    FULL ORGAN SYSTEM SKIN EXAM (SKIN)   Hair, Scalp, Ears, Face Normal except as noted below in Assessment   Neck, Cervical Chain Nodes Normal except as noted below in Assessment   Right Arm/Hand/Fingers Normal except as noted below in Assessment   Left Arm/Hand/Fingers Normal except as noted below in Assessment   Chest/Breasts/Axillae Viewed areas Normal except as noted below in Assessment   Abdomen, Umbilicus Normal except as noted below in Assessment   Back/Spine Normal except as noted below in Assessment   Groin/Genitalia/Buttocks Viewed areas Normal except as noted below in Assessment   Right Leg, Foot, Toes Normal except as noted below in Assessment   Left Leg, Foot, Toes Normal except as noted below in Assessment       ISOTRETINOIN "ACCUTANE": FEMALE    Age: 33 y o  Weight (in KILOgrams): 62 6 KG   Pulse: 72  Blood Pressure: 122/58    Ipledge number: [id number]  Last 4 digits SS: [id number]    Contraception Type 1: hormonal combination oral contraceptive pill  Contraception Type 2: male latex condom  Patient reminded that this must be listed in same order on iPledge  Yes       "Goal Dose" in mg (125 mg x weight in kg): 7,762 5 mg    Interim month   "Daily Dose" of Isotretinoin the patient has actually been taking since last visit (this is usually what was prescribed): 80 mg   "Total # of Days" patient took Isotretinoin since last visit (this is usually 30):  30 days   "Total Monthly Dose" in mg since last visit (this equals "Daily Dose" x "Total # of Days"): 2700 mg    Cumulative dosage   "Total cumulative Dose" in mg (add the above "Total Monthly Dose" with "Total Cumulative Dose" from last visit: 3900 mg        Symptoms   Conjunctivitis: No   Night Blindness/ Issues with night vision: No   Focusing Problems: No   Dry Lips/Eyes: YES   Dry Skin: YES   Rash: No   Nose Bleeds: YES   Angular cheilitis (cracking in corner of lips):  YES   Headaches: No   Photosensitivity: No   Dry Anus: No   Depression: No   Mood Changes: No   Suicidal thoughts: No   Fatigue: YES   Weight Loss: No   Muscle Pain/Stiffness: No   Abdominal pain: No   Diarrhea: No   Bloody stools: No    Physical Exam   Psychiatric/Mood: Normal    Anatomic Location Affected: Face    Morphological Description:  o Open/Closed Comedones:  - Several ("Moderate")  o Inflammatory Papules/Pustules:  - Few ("Mild")  o Nodules:  - Few ("Mild")  o Scarring:  - Several ("Moderate")  o Excoriations:  - No evidence ("Clear")  o Local Skin Redness/Erythema:  - Few ("Mild")  o Local Skin Dryness/Scaling:  - Few ("Mild")  o Local Skin Dyspigmentation:  - Few ("Mild")   Pertinent Positives: N/A   Pertinent Negatives: N/A    Labs   Date of last labs: 04/26/2021   Completed: YES   Labs reviewed: within normal limits   Pregnancy test: serum qualitative  - Result: negative  - Interpretation- pregnant: No      Additional History of Present Condition:  Patient is currently on 80 mg daily of accutane  Patient reports she is still having some break outs  DIAGNOSES:     Acne Vulgaris   High Risk Medication   Medication Monitoring   Xerosis (see below for patient education)    Assessment and Plan:   Target Total Dose per KILOgram:  125 mg/KILOgram (this may change this on a patient-by-patient basis)   Planned daily dose for next 30 days: 60 mg - dose reduced due to ache in the achilles tendon with activity    (please remember to add patient's "Ipledge number" to actual prescription):  [id number]   Return to clinic in about 1 month, please review ipledge guidelines in terms of timing (see below for patient education)   Patient confirmed in iPledge: YES   Patients counseled:  - Cannot donate blood while taking isotretinoin and for 1 month after completing therapy  YES  - Do not share medication with anyone   YES  - Possible side effects discussed, including sun sensitivity, dry lips/eyes/skin, headaches, blurry vision (pseudotumor cerebri), muscle/joint aches, GI upset, bloody stools (IBD including Crohns/Ulcerative Colitis), jaundice, liver dysfunction, lipid abnormalities, bone marrow dysfunction, mood effects, thoughts of hurting oneself or others, and flaring of acne (acne fulminans/SAPPHO)  YES  - Females cannot get pregnant and must be on two forms of birth control while on therapy and at least one month after  YES  - Report any side effects of mood swings or depression and stop medication upon occurrence  YES  - Patient needs to confirm counseling in iPledge prior to picking up prescription  YES    1 month virtual f/u with home urine pregnancy test     PLEASE DO HOME PREGNANCY TEST AND SHOW ON CAMERA AT VIRTUAL VISIT     Isotretinoin for Acne   Isotretinoin is a retinoid medication that is taken by mouth to treat severe nodular acne  Typically, it is used once other acne treatments have not worked, such as oral antibiotics  Usually isotretinoin is taken for 4 to 6 months, although the length of treatment can vary from person to person  While most patients acne improves and may even clear with this medication, in 20% of patients acne can come back  This requires additional acne treatment or even a second cycle of isotretinoin  How should I take isotretinoin?  Isotretinoin dosing is weight-based and should be taken exactly as prescribed   If you miss a dose, skip that dose  Do not take 2 doses at the same time   Take with food to help with absorption   All instructions in the iPLEDGE program packet (www [employer]) that was provided must be followed (see below for highlights)   You will get a 30 day supply of isotretinoin at a time  It cannot be automatically refilled  Make certain that you have been given enough medication to last 30 days as pharmacists are unable to refill or make changes within a month     You must return to your dermatologist every month to make sure you are not having any serious side effects from isotretinoin  For female patients, this visit will always include a monthly pregnancy test  Other laboratory studies, including liver function tests, cholesterol and triglycerides, must also be conducted before and during treatment  What should I avoid while taking isotretinoin?  Do not get pregnant from one month prior or 1 month following taking any isotretinoin   Do not donate blood while take isotretinoin or until 1 months after coming off the medication   Do not have cosmetic procedures to smooth your skin, including waxing, dermabrasion, or laser procedures, while taking this medication and for at least 6 months after you stop   Do not share isotretinoin with any other people  It can cause birth defects and other serious health problems   Do not use any other acne medications, including medicated washes, cleansers, creams or antibiotic pills during your treatment with isotretinoin unless expressly directed to by your dermatologist      Initiating isotretinoin & the iPLEDGE Program    The iPLEDGE Program is a strict, government-required program to prevent females from becoming pregnant while on isotretinoin  All females and males must participate  Note: Your provider must follow this program and cannot change any of the requirements   Before starting isotretinoin, your provider will talk to you about the safe use of this medication and you will need to sign consent forms in order to receive treatment   If you fail to keep appointments, you will be unable to get your prescription filled  Lissett Diones For females of childbearing age:      angelia Wyatt must be on two specific forms of birth control before starting isotretinoin  Your provider must get 2 negative pregnancy tests, at least 30 days apart, before you can proceed with the medication  The second pregnancy test must be obtained within 5 days of the menstrual cycle   If you choose not to be sexually active during treatment, you still must have the 2 negative pregnancy tests    o You must answer a series of questions either online or by phone every month prior to getting the prescription  o Monthly prescriptions must be filled within 7 days of that month's pregnancy test   It is important that you notify your physician well before the 7th day if there are any unforeseen delays, such as prior authorizations  o For more information, visit: [email or web address]    What are the possible side effects of isotretinoin? What should I do? Most side effects from isotretinoin are mild and can be easily improved with simple remedies  Others are more concerning   Dry skin and eyes, chapped lips and dry nose that may lead to nosebleeds  o Dry Skin: Apply sensitive skin moisturizers to dry skin at least two times a day  You may need sunscreen (SPF 30) in the morning and to reapply when outside  o Dry Eyes: Use saline eye drops or artificial tears  o Dry Nose/Nosebleeds: Use saline nasal spray (ex  Ayr) during the day and at bedtime  To stop nosebleeds, hold pressure  If this does not work, call your dermatologist     o Chapped lips: apply petrolatum-based lip balms routinely  Avoid anything medicated   Contact your dermatologist for excessive dryness, cracks, tenderness or pain   Increased blood fats and cholesterol (usually in patients with a personal or family history of cholesterol or triglyceride problems)   Vision problems affecting your ability to see in the dark and drive at night   Bone, muscle and tendon aches can occur  Additional stretching before and after activities may help relieve aches  If you are otherwise healthy, consider the use of ibuprofen or naproxen    If you are unsure if you can use these pain medications, ask your doctor first  Also, call your doctor if you experience severe back pain, joint pain, or a broken bone    Changes in mood, including anxiety, depressive symptoms or suicidal thoughts which may or may not be temporary  Notify your doctor if your or a family member have suffered from these conditions or if you have any concerns during treatment   Serious birth defects or miscarriage can occur while taking this medication and for one month after taking your last dose of isotretinoin  You must not get pregnant while taking isotretinoin  Once the medication is out of your system, 30 days, there is no effect on the baby   Increased pressure in the brain  Call your doctor right away if you experience bad headache, blurred vision, dizziness, nausea or vomiting, or seizures   Skin rash - call your doctor right away if you develop any rashes or blisters on the skin   Liver damage - call your doctor right away if you experience severe stomach, chest or bowel pain, painful swallowing, diarrhea, blood in your stool, yellowing of your skin or eyes, or dark urine   Gastrointestinal bleeding  If you experience unusual abdominal pain or red or black/tarry stools, call your doctor immediately  You should also notify your doctor if you or a family member has a history of ulcerative colitis or Crohns disease   Worsening acne  Mild worsening of acne can occur in the first few weeks of using isotreinoin  If your acne is getting significantly worse, call your doctor  This may require temporarily stopping the isotretinoin and possibly adding other medications  XEROSIS ("DRY SKIN")    Dry skin refers to skin that feels dry to touch  Dry skin has a dull surface with a rough, scaly quality  The skin is less pliable and cracked  When dryness is severe, the skin may become inflamed and fissured  Although any body site can be dry, dry skin tends to affect the shins more than any other site  Dry skin is lacking moisture in the outer horny cell layer (stratum corneum) and this results in cracks in the skin surface    Dry skin is also called xerosis, xeroderma or asteatosis (lack of fat)  It can affect males and females of all ages  There is some racial variability in water and lipid content of the skin   Dry skin that starts in early childhood may be one of about 20 types of ichthyosis (fish-scale skin)  There is often a family history of dry skin   Dry skin is commonly seen in people with atopic dermatitis   Nearly everyone > 60 years has dry skin  Dry skin that begins later may be seen in people with certain diseases and conditions   Postmenopausal women   Hypothyroidism   Chronic renal disease    Malnutrition and weight loss    Subclinical dermatitis    Treatment with certain drugs such as oral retinoids, diuretics and epidermal growth factor receptor inhibitors    People exposed to a dry environment may experience dry skin   Low humidity: in desert climates or cool, windy conditions    Excessive air conditioning    Direct heat from a fire or fan heater    Excessive bathing    Contact with soap, detergents and solvents    Inappropriate topical agents such as alcohol    Frictional irritation from rough clothing or abrasives    Dry skin is due to abnormalities in the integrity of the barrier function of the stratum corneum, which is made up of corneocytes   There is an overall reduction in the lipids in the stratum corneum   Ratio of ceramides, cholesterol and free fatty acids may be normal or altered   There may be a reduction in the proliferation of keratinocytes   Keratinocyte subtypes change in dry skin with a decrease in keratins K1, K10 and increase in K5, K14     Involucrin (a protein) may be expressed early, increasing cell stiffness   The result is retention of corneocytes and reduced water-holding capacity  What is the treatment for dry skin? The mainstay of treatment of dry skin and ichthyosis is moisturisers/emollients   They should be applied liberally and often enough to:   Reduce itch  Improve the barrier function    Prevent entry of irritants, bacteria    Reduce transepidermal water loss  When considering which emollient is most suitable, consider:   Severity of the dryness    Tolerance    Personal preference    Cost and availability  Emollients generally work best if applied to damp skin, if pH is below 7 (acidic), and if containing humectants such as urea or propylene glycol  Additional treatments include:   Topical steroid if itchy or there is dermatitis -- choose an emollient base    Topical calcineurin inhibitors if topical steroids are unsuitable  How can dry skin be prevented? Eliminate aggravating factors:   Reduce the frequency of bathing   A humidifier in winter and air conditioner in summer    Compare having a short shower with a prolonged soak in a bath   Use lukewarm, not hot, water   Replace standard soap with a substitute such as a synthetic detergent cleanser, water-miscible emollient, bath oil, anti-pruritic tar oil, colloidal oatmeal etc     Apply an emollient liberally and often, particularly shortly after bathing, and when itchy  The drier the skin, the thicker this should be, especially on the hands  What is the outlook for dry skin? A tendency to dry skin may persist life-long, or it may improve once contributing factors are controlled      Scribe Attestation    I,:  Enrike Durán am acting as a scribe while in the presence of the attending physician :       I,:  Fidencio Coleman MD personally performed the services described in this documentation    as scribed in my presence :

## 2021-04-26 NOTE — TELEPHONE ENCOUNTER
Spoke with patient and sent request to the insurance company to adjust 3/1/21 bill by removing CPT code for skin tag removal  Patient satisfied

## 2021-05-26 ENCOUNTER — TELEMEDICINE (OUTPATIENT)
Dept: DERMATOLOGY | Facility: CLINIC | Age: 31
End: 2021-05-26
Payer: COMMERCIAL

## 2021-05-26 VITALS — HEIGHT: 62 IN | BODY MASS INDEX: 25.4 KG/M2 | WEIGHT: 138 LBS

## 2021-05-26 DIAGNOSIS — L70.0 ACNE VULGARIS: Primary | ICD-10-CM

## 2021-05-26 DIAGNOSIS — L85.3 XEROSIS CUTIS: ICD-10-CM

## 2021-05-26 PROCEDURE — 1036F TOBACCO NON-USER: CPT | Performed by: DERMATOLOGY

## 2021-05-26 PROCEDURE — 3008F BODY MASS INDEX DOCD: CPT | Performed by: DERMATOLOGY

## 2021-05-26 PROCEDURE — 99214 OFFICE O/P EST MOD 30 MIN: CPT | Performed by: DERMATOLOGY

## 2021-05-26 NOTE — PATIENT INSTRUCTIONS
DIAGNOSES:     Acne Vulgaris   High Risk Medication   Medication Monitoring   Xerosis (see below for patient education)    Assessment and Plan:   Target Total Dose per KILOgram:  125 mg/KILOgram (this may change this on a patient-by-patient basis)   Planned daily dose for next 30 days: 60 mg    (please remember to add patient's "Ipledge number" to actual prescription):  [id number]   Return to clinic in about 1 month, please review ipledge guidelines in terms of timing (see below for patient education)   Get labs 1-2 days before next appointment: No   Patient confirmed in iPledge: YES   Patients counseled:  - Cannot donate blood while taking isotretinoin and for 1 month after completing therapy  YES  - Do not share medication with anyone  YES  - Possible side effects discussed, including sun sensitivity, dry lips/eyes/skin, headaches, blurry vision (pseudotumor cerebri), muscle/joint aches, GI upset, bloody stools (IBD including Crohns/Ulcerative Colitis), jaundice, liver dysfunction, lipid abnormalities, bone marrow dysfunction, mood effects, thoughts of hurting oneself or others, and flaring of acne (acne fulminans/SAPPHO)  YES  - Females cannot get pregnant and must be on two forms of birth control while on therapy and at least one month after  YES  - Report any side effects of mood swings or depression and stop medication upon occurrence  YES  - Patient needs to confirm counseling in iPledge prior to picking up prescription  YES      Isotretinoin for Acne   Isotretinoin is a retinoid medication that is taken by mouth to treat severe nodular acne  Typically, it is used once other acne treatments have not worked, such as oral antibiotics  Usually isotretinoin is taken for 4 to 6 months, although the length of treatment can vary from person to person  While most patients acne improves and may even clear with this medication, in 20% of patients acne can come back   This requires additional acne treatment or even a second cycle of isotretinoin  How should I take isotretinoin?  Isotretinoin dosing is weight-based and should be taken exactly as prescribed   If you miss a dose, skip that dose  Do not take 2 doses at the same time   Take with food to help with absorption   All instructions in the iPLEDGE program packet (www [employer]) that was provided must be followed (see below for highlights)   You will get a 30 day supply of isotretinoin at a time  It cannot be automatically refilled  Make certain that you have been given enough medication to last 30 days as pharmacists are unable to refill or make changes within a month   You must return to your dermatologist every month to make sure you are not having any serious side effects from isotretinoin  For female patients, this visit will always include a monthly pregnancy test  Other laboratory studies, including liver function tests, cholesterol and triglycerides, must also be conducted before and during treatment  What should I avoid while taking isotretinoin?  Do not get pregnant from one month prior or 1 month following taking any isotretinoin   Do not donate blood while take isotretinoin or until 1 months after coming off the medication   Do not have cosmetic procedures to smooth your skin, including waxing, dermabrasion, or laser procedures, while taking this medication and for at least 6 months after you stop   Do not share isotretinoin with any other people  It can cause birth defects and other serious health problems   Do not use any other acne medications, including medicated washes, cleansers, creams or antibiotic pills during your treatment with isotretinoin unless expressly directed to by your dermatologist      Initiating isotretinoin & the iPLEDGE Program    The iPLEDGE Program is a strict, government-required program to prevent females from becoming pregnant while on isotretinoin   All females and males must participate  Note: Your provider must follow this program and cannot change any of the requirements   Before starting isotretinoin, your provider will talk to you about the safe use of this medication and you will need to sign consent forms in order to receive treatment   If you fail to keep appointments, you will be unable to get your prescription filled  [employer] For females of childbearing age:      o Gabriella Kang must be on two specific forms of birth control before starting isotretinoin  Your provider must get 2 negative pregnancy tests, at least 30 days apart, before you can proceed with the medication  The second pregnancy test must be obtained within 5 days of the menstrual cycle  If you choose not to be sexually active during treatment, you still must have the 2 negative pregnancy tests    o You must answer a series of questions either online or by phone every month prior to getting the prescription  o Monthly prescriptions must be filled within 7 days of that month's pregnancy test   It is important that you notify your physician well before the 7th day if there are any unforeseen delays, such as prior authorizations  o For more information, visit: [email or web address]    What are the possible side effects of isotretinoin? What should I do? Most side effects from isotretinoin are mild and can be easily improved with simple remedies  Others are more concerning   Dry skin and eyes, chapped lips and dry nose that may lead to nosebleeds  o Dry Skin: Apply sensitive skin moisturizers to dry skin at least two times a day  You may need sunscreen (SPF 30) in the morning and to reapply when outside  o Dry Eyes: Use saline eye drops or artificial tears  o Dry Nose/Nosebleeds: Use saline nasal spray (ex  Ayr) during the day and at bedtime  To stop nosebleeds, hold pressure    If this does not work, call your dermatologist     o Chapped lips: apply petrolatum-based lip balms routinely  Avoid anything medicated   Contact your dermatologist for excessive dryness, cracks, tenderness or pain   Increased blood fats and cholesterol (usually in patients with a personal or family history of cholesterol or triglyceride problems)   Vision problems affecting your ability to see in the dark and drive at night   Bone, muscle and tendon aches can occur  Additional stretching before and after activities may help relieve aches  If you are otherwise healthy, consider the use of ibuprofen or naproxen  If you are unsure if you can use these pain medications, ask your doctor first  Also, call your doctor if you experience severe back pain, joint pain, or a broken bone    Changes in mood, including anxiety, depressive symptoms or suicidal thoughts which may or may not be temporary  Notify your doctor if your or a family member have suffered from these conditions or if you have any concerns during treatment   Serious birth defects or miscarriage can occur while taking this medication and for one month after taking your last dose of isotretinoin  You must not get pregnant while taking isotretinoin  Once the medication is out of your system, 30 days, there is no effect on the baby   Increased pressure in the brain  Call your doctor right away if you experience bad headache, blurred vision, dizziness, nausea or vomiting, or seizures   Skin rash - call your doctor right away if you develop any rashes or blisters on the skin   Liver damage - call your doctor right away if you experience severe stomach, chest or bowel pain, painful swallowing, diarrhea, blood in your stool, yellowing of your skin or eyes, or dark urine   Gastrointestinal bleeding  If you experience unusual abdominal pain or red or black/tarry stools, call your doctor immediately   You should also notify your doctor if you or a family member has a history of ulcerative colitis or Crohns disease   Worsening acne  Mild worsening of acne can occur in the first few weeks of using isotreinoin  If your acne is getting significantly worse, call your doctor  This may require temporarily stopping the isotretinoin and possibly adding other medications  XEROSIS ("DRY SKIN")    Dry skin refers to skin that feels dry to touch  Dry skin has a dull surface with a rough, scaly quality  The skin is less pliable and cracked  When dryness is severe, the skin may become inflamed and fissured  Although any body site can be dry, dry skin tends to affect the shins more than any other site  Dry skin is lacking moisture in the outer horny cell layer (stratum corneum) and this results in cracks in the skin surface  Dry skin is also called xerosis, xeroderma or asteatosis (lack of fat)  It can affect males and females of all ages  There is some racial variability in water and lipid content of the skin   Dry skin that starts in early childhood may be one of about 20 types of ichthyosis (fish-scale skin)  There is often a family history of dry skin   Dry skin is commonly seen in people with atopic dermatitis   Nearly everyone > 60 years has dry skin  Dry skin that begins later may be seen in people with certain diseases and conditions   Postmenopausal women   Hypothyroidism   Chronic renal disease    Malnutrition and weight loss    Subclinical dermatitis    Treatment with certain drugs such as oral retinoids, diuretics and epidermal growth factor receptor inhibitors    People exposed to a dry environment may experience dry skin     Low humidity: in desert climates or cool, windy conditions    Excessive air conditioning    Direct heat from a fire or fan heater    Excessive bathing    Contact with soap, detergents and solvents    Inappropriate topical agents such as alcohol    Frictional irritation from rough clothing or abrasives    Dry skin is due to abnormalities in the integrity of the barrier function of the stratum corneum, which is made up of corneocytes   There is an overall reduction in the lipids in the stratum corneum   Ratio of ceramides, cholesterol and free fatty acids may be normal or altered   There may be a reduction in the proliferation of keratinocytes   Keratinocyte subtypes change in dry skin with a decrease in keratins K1, K10 and increase in K5, K14     Involucrin (a protein) may be expressed early, increasing cell stiffness   The result is retention of corneocytes and reduced water-holding capacity  What is the treatment for dry skin? The mainstay of treatment of dry skin and ichthyosis is moisturisers/emollients  They should be applied liberally and often enough to:   Reduce itch    Improve the barrier function    Prevent entry of irritants, bacteria    Reduce transepidermal water loss  When considering which emollient is most suitable, consider:   Severity of the dryness    Tolerance    Personal preference    Cost and availability  Emollients generally work best if applied to damp skin, if pH is below 7 (acidic), and if containing humectants such as urea or propylene glycol  Additional treatments include:   Topical steroid if itchy or there is dermatitis -- choose an emollient base    Topical calcineurin inhibitors if topical steroids are unsuitable  How can dry skin be prevented? Eliminate aggravating factors:   Reduce the frequency of bathing   A humidifier in winter and air conditioner in summer    Compare having a short shower with a prolonged soak in a bath   Use lukewarm, not hot, water   Replace standard soap with a substitute such as a synthetic detergent cleanser, water-miscible emollient, bath oil, anti-pruritic tar oil, colloidal oatmeal etc     Apply an emollient liberally and often, particularly shortly after bathing, and when itchy   The drier the skin, the thicker this should be, especially on the hands     What is the outlook for dry skin? A tendency to dry skin may persist life-long, or it may improve once contributing factors are controlled

## 2021-05-26 NOTE — PROGRESS NOTES
Virtual Regular Visit      Assessment/Plan:    Problem List Items Addressed This Visit     None      Visit Diagnoses     Acne vulgaris    -  Primary    Xerosis cutis                   Reason for visit is   Chief Complaint   Patient presents with    Virtual Regular Visit        Encounter provider Esther Hawkins MD    Provider located at [address] [place] [address] [address] [place] [address]  [phone]      Recent Visits  No visits were found meeting these conditions  Showing recent visits within past 7 days and meeting all other requirements     Today's Visits  Date Type Provider Dept   05/26/21 Telemedicine Esther Hawkins MD [facility] [address] [place] [address] today's visits and meeting all other requirements     Future Appointments  No visits were found meeting these conditions  Showing future appointments within next 150 days and meeting all other requirements        The patient was identified by name and date of birth  Rati Hunter Favre was informed that this is a telemedicine visit and that the visit is being conducted through [employer] and patient was informed that this is a secure, HIPAA-compliant platform  She agrees to proceed     My office door was closed  The patient was notified the following individuals were present in the room 2  She acknowledged consent and understanding of privacy and security of the video platform  The patient has agreed to participate and understands they can discontinue the visit at any time  Patient is aware this is a billable service  Subjective  Rati Hunter Favre is a 33 y o  female Accutane         HPI     Past Medical History:   Diagnosis Date    Miscarriage        Past Surgical History:   Procedure Laterality Date    NO PAST SURGERIES         Current Outpatient Medications   Medication Sig Dispense Refill    cetirizine (ZyrTEC) 10 mg tablet Take 10 mg by mouth daily      Iron-Vitamin C (IRON 100/C PO) Take by mouth      ISOtretinoin (ACCUTANE) 30 MG capsule IPLEDGE NUMBER: [id number] Take 2 pills by mouth daily  60 capsule 0    metoclopramide (Reglan) 10 mg tablet       Multiple Vitamins-Minerals (multivitamin with minerals) tablet Take 1 tablet by mouth daily      norgestimate-ethinyl estradiol (ORTHO-CYCLEN) 0 25-35 MG-MCG per tablet Take one pill by mouth daily  30 tablet 3    Omega-3 Fatty Acids (fish oil) 1,000 mg Take 1,000 mg by mouth daily      valACYclovir (VALTREX) 500 mg tablet Take 500 mg by mouth 2 (two) times a day      vitamin B-12 (CYANOCOBALAMIN) 100 MCG TABS Take 50 mcg by mouth daily      VITAMIN D PO Take by mouth      ISOtretinoin (ACCUTANE) 40 MG capsule Take 1 capsule (40 mg total) by mouth 2 (two) times a day IPledge: [id number] (Patient not taking: Reported on 5/26/2021) 60 capsule 0    Prenatal Vit-Fe Fumarate-FA (prenatal vitamin) 28-0 8 mg Take 1 tablet by mouth daily (Patient not taking: Reported on 5/26/2021) 100 tablet 3    spironolactone (ALDACTONE) 25 mg tablet Take 1 tablet (25 mg total) by mouth daily (Patient not taking: Reported on 3/1/2021) 30 tablet 2     No current facility-administered medications for this visit  No Known Allergies    Review of Systems    Video Exam    Vitals:    05/26/21 1618   Weight: 62 6 kg (138 lb)   Height: 5' 2" (1 575 m)       Physical Exam     I spent 25 minutes directly with the patient during this visit      VIRTUAL VISIT DISCLAIMER    Cesia Scales acknowledges that she has consented to an online visit or consultation  She understands that the online visit is based solely on information provided by her, and that, in the absence of a face-to-face physical evaluation by the physician, the diagnosis she receives is both limited and provisional in terms of accuracy and completeness  This is not intended to replace a full medical face-to-face evaluation by the physician   Cesia Scales understands and accepts these [facility] Follow Up Note    Patient Name: Linette Robb  Encounter Date: 05/26/2021    Today's Chief Concerns:  Venancio Banegas Concern #1:  Accutane       Current Medications:    Current Outpatient Medications:     cetirizine (ZyrTEC) 10 mg tablet, Take 10 mg by mouth daily, Disp: , Rfl:     Iron-Vitamin C (IRON 100/C PO), Take by mouth, Disp: , Rfl:     ISOtretinoin (ACCUTANE) 30 MG capsule, IPLEDGE NUMBER: [id number] Take 2 pills by mouth daily  , Disp: 60 capsule, Rfl: 0    metoclopramide (Reglan) 10 mg tablet, , Disp: , Rfl:     Multiple Vitamins-Minerals (multivitamin with minerals) tablet, Take 1 tablet by mouth daily, Disp: , Rfl:     norgestimate-ethinyl estradiol (ORTHO-CYCLEN) 0 25-35 MG-MCG per tablet, Take one pill by mouth daily  , Disp: 30 tablet, Rfl: 3    Omega-3 Fatty Acids (fish oil) 1,000 mg, Take 1,000 mg by mouth daily, Disp: , Rfl:     valACYclovir (VALTREX) 500 mg tablet, Take 500 mg by mouth 2 (two) times a day, Disp: , Rfl:     vitamin B-12 (CYANOCOBALAMIN) 100 MCG TABS, Take 50 mcg by mouth daily, Disp: , Rfl:     VITAMIN D PO, Take by mouth, Disp: , Rfl:     ISOtretinoin (ACCUTANE) 40 MG capsule, Take 1 capsule (40 mg total) by mouth 2 (two) times a day IPledge: [id number] (Patient not taking: Reported on 5/26/2021), Disp: 60 capsule, Rfl: 0    Prenatal Vit-Fe Fumarate-FA (prenatal vitamin) 28-0 8 mg, Take 1 tablet by mouth daily (Patient not taking: Reported on 5/26/2021), Disp: 100 tablet, Rfl: 3    spironolactone (ALDACTONE) 25 mg tablet, Take 1 tablet (25 mg total) by mouth daily (Patient not taking: Reported on 3/1/2021), Disp: 30 tablet, Rfl: 2    CONSTITUTIONAL:   Vitals:    05/26/21 1618   Weight: 62 6 kg (138 lb)   Height: 5' 2" (1 575 m)         Specific Alerts:    Have you been seen by a [facility] in the last 3 years? YES    Are you pregnant or planning to become pregnant?  No    Are you currently or planning to be nursing or breast feeding? No    No Known Allergies    May we call your Preferred Phone number to discuss your specific medical information? YES    May we leave a detailed message that includes your specific medical information? YES    Have you traveled outside of the [facility] in the past 3 months? No    Do you currently have a pacemaker or defibrillator? No    Do you have any artificial heart valves, joints, plates, screws, rods, stents, pins, etc? No   - If Yes, were any placed within the last 2 years? Do you require any medications prior to a surgical procedure? No      Are you taking any medications that cause you to bleed more easily ("blood thinners") No    Have you ever experienced a rapid heartbeat with epinephrine? No    Have you ever been treated with "gold" (gold sodium thiomalate) therapy? No    [facility] can help with wrinkles, "laugh lines," facial volume loss, "double chin," "love handles," age spots, and more  Are you interested in learning today about some of the skin enhancement procedures that we offer? (If Yes, please provide more detail) No    Review of Systems:  Have you recently had or currently have any of the following?     · Fever or chills: No  · Night Sweats: No  · Headaches: No  · Weight Gain: No  · Weight Loss: No  · Blurry Vision: No  · Nausea: No  · Vomiting: No  · Diarrhea: No  · Blood in Stool: No  · Abdominal Pain: No  · Itchy Skin: No  · Painful Joints: YES  · Swollen Joints: No  · Muscle Pain: No  · Irregular Mole: No  · Sun Burn: No  · Dry Skin: YES  · Skin Color Changes: No  · Scar or Keloid: No  · Cold Sores/Fever Blisters: No  · Bacterial Infections/MRSA: No  · Anxiety: No  · Depression: No  · Suicidal or Homicidal Thoughts: No      PSYCH: Normal mood and affect  EYES: Normal conjunctiva  ENT: Normal lips and oral mucosa  CARDIOVASCULAR: No edema  RESPIRATORY: Normal respirations  HEME/LYMPH/IMMUNO:  No regional lymphadenopathy except as noted below in ASSESSMENT AND PLAN BY DIAGNOSIS    FULL ORGAN SYSTEM SKIN EXAM (SKIN)   Ears, Face, Lips  Normal except as noted below in Assessment   Neck, Cervical Chain Nodes Normal except as noted below in Assessment   Right Hand/Fingers Normal except as noted below in Assessment   Left Hand/Fingers Normal except as noted below in Assessment         ISOTRETINOIN "ACCUTANE": FEMALE    Age: 33 y o  Weight (in KILOgrams): 62 6 KG  Pulse: Virtual   Blood Pressure: Virtual     Ipledge number: [id number]  Last 4 digits SS: [id number]    Contraception Type 1: hormonal combination oral contraceptive pill  Contraception Type 2: male latex condom  Patient reminded that this must be listed in same order on iPledge  Yes       "Goal Dose" in mg (125 mg x weight in kg): 7,762 5 mg    Interim month   "Daily Dose" of Isotretinoin the patient has actually been taking since last visit (this is usually what was prescribed): 60 mg   "Total # of Days" patient took Isotretinoin since last visit (this is usually 30):  30 days   "Total Monthly Dose" in mg since last visit (this equals "Daily Dose" x "Total # of Days"): 900 mg    Cumulative dosage   "Total cumulative Dose" in mg (add the above "Total Monthly Dose" with "Total Cumulative Dose" from last visit: 4800 mg        Symptoms   Conjunctivitis: No   Night Blindness/ Issues with night vision: No   Focusing Problems: No   Dry Lips/Eyes: YES   Dry Skin: YES   Rash: No   Nose Bleeds: No   Angular cheilitis (cracking in corner of lips): YES   Headaches: YES   Photosensitivity: No   Dry Anus: No   Depression: No   Mood Changes: No   Suicidal thoughts: No   Fatigue: No   Weight Loss: No   Muscle Pain/Stiffness: YES   Abdominal pain: No   Diarrhea: No   Bloody stools: No    Physical Exam   Psychiatric/Mood: Normal    Anatomic Location Affected:   Face   Morphological Description:   Open and closed comedones and erythematous papules     Labs   Date of last labs: 05/26/2021   Completed: YES   Labs reviewed: within normal limits   Pregnancy test: urine pregnancy at home because of COVID 19  - Result: negative  - Interpretation- pregnant: No      Additional History of Present Condition:  Patient is currently on Accutane 30 mg twice daily  Patient reports some dryness and joint stiffness  DIAGNOSES:     Acne Vulgaris   High Risk Medication   Medication Monitoring   Xerosis (see below for patient education)    Assessment and Plan:   Target Total Dose per KILOgram:  125 mg/KILOgram (this may change this on a patient-by-patient basis)   Planned daily dose for next 30 days: 60 mg    (please remember to add patient's "Ipledge number" to actual prescription):  [id number]   Return to clinic in about 1 month, please review ipledge guidelines in terms of timing (see below for patient education)   Get labs 1-2 days before next appointment: No   Patient confirmed in iPledge: YES   Patients counseled:  - Cannot donate blood while taking isotretinoin and for 1 month after completing therapy  YES  - Do not share medication with anyone  YES  - Possible side effects discussed, including sun sensitivity, dry lips/eyes/skin, headaches, blurry vision (pseudotumor cerebri), muscle/joint aches, GI upset, bloody stools (IBD including Crohns/Ulcerative Colitis), jaundice, liver dysfunction, lipid abnormalities, bone marrow dysfunction, mood effects, thoughts of hurting oneself or others, and flaring of acne (acne fulminans/SAPPHO)  YES  - Females cannot get pregnant and must be on two forms of birth control while on therapy and at least one month after  YES  - Report any side effects of mood swings or depression and stop medication upon occurrence  YES  - Patient needs to confirm counseling in iPledge prior to picking up prescription  YES      Isotretinoin for Acne   Isotretinoin is a retinoid medication that is taken by mouth to treat severe nodular acne   Typically, it is used once other acne treatments have not worked, such as oral antibiotics  Usually isotretinoin is taken for 4 to 6 months, although the length of treatment can vary from person to person  While most patients acne improves and may even clear with this medication, in 20% of patients acne can come back  This requires additional acne treatment or even a second cycle of isotretinoin  How should I take isotretinoin?  Isotretinoin dosing is weight-based and should be taken exactly as prescribed   If you miss a dose, skip that dose  Do not take 2 doses at the same time   Take with food to help with absorption   All instructions in the iPLEDGE program packet (www [employer]) that was provided must be followed (see below for highlights)   You will get a 30 day supply of isotretinoin at a time  It cannot be automatically refilled  Make certain that you have been given enough medication to last 30 days as pharmacists are unable to refill or make changes within a month   You must return to your dermatologist every month to make sure you are not having any serious side effects from isotretinoin  For female patients, this visit will always include a monthly pregnancy test  Other laboratory studies, including liver function tests, cholesterol and triglycerides, must also be conducted before and during treatment  What should I avoid while taking isotretinoin?  Do not get pregnant from one month prior or 1 month following taking any isotretinoin   Do not donate blood while take isotretinoin or until 1 months after coming off the medication   Do not have cosmetic procedures to smooth your skin, including waxing, dermabrasion, or laser procedures, while taking this medication and for at least 6 months after you stop   Do not share isotretinoin with any other people  It can cause birth defects and other serious health problems      Do not use any other acne medications, including medicated washes, cleansers, creams or antibiotic pills during your treatment with isotretinoin unless expressly directed to by your dermatologist      Initiating isotretinoin & the [facility] Program is a strict, government-required program to prevent females from becoming pregnant while on isotretinoin  All females and males must participate  Note: Your provider must follow this program and cannot change any of the requirements   Before starting isotretinoin, your provider will talk to you about the safe use of this medication and you will need to sign consent forms in order to receive treatment   If you fail to keep appointments, you will be unable to get your prescription filled  [facility] For females of childbearing age:      o Ana De Santiago must be on two specific forms of birth control before starting isotretinoin  Your provider must get 2 negative pregnancy tests, at least 30 days apart, before you can proceed with the medication  The second pregnancy test must be obtained within 5 days of the menstrual cycle  If you choose not to be sexually active during treatment, you still must have the 2 negative pregnancy tests    o You must answer a series of questions either online or by phone every month prior to getting the prescription  o Monthly prescriptions must be filled within 7 days of that month's pregnancy test   It is important that you notify your physician well before the 7th day if there are any unforeseen delays, such as prior authorizations  o For more information, visit: [email or web address]    What are the possible side effects of isotretinoin? What should I do? Most side effects from isotretinoin are mild and can be easily improved with simple remedies  Others are more concerning   Dry skin and eyes, chapped lips and dry nose that may lead to nosebleeds  o Dry Skin: Apply sensitive skin moisturizers to dry skin at least two times a day   You may need sunscreen (SPF 27) in the morning and to reapply when outside  o Dry Eyes: Use saline eye drops or artificial tears  o Dry Nose/Nosebleeds: Use saline nasal spray (ex  Ayr) during the day and at bedtime  To stop nosebleeds, hold pressure  If this does not work, call your dermatologist     o Chapped lips: apply petrolatum-based lip balms routinely  Avoid anything medicated   Contact your dermatologist for excessive dryness, cracks, tenderness or pain   Increased blood fats and cholesterol (usually in patients with a personal or family history of cholesterol or triglyceride problems)   Vision problems affecting your ability to see in the dark and drive at night   Bone, muscle and tendon aches can occur  Additional stretching before and after activities may help relieve aches  If you are otherwise healthy, consider the use of ibuprofen or naproxen  If you are unsure if you can use these pain medications, ask your doctor first  Also, call your doctor if you experience severe back pain, joint pain, or a broken bone    Changes in mood, including anxiety, depressive symptoms or suicidal thoughts which may or may not be temporary  Notify your doctor if your or a family member have suffered from these conditions or if you have any concerns during treatment   Serious birth defects or miscarriage can occur while taking this medication and for one month after taking your last dose of isotretinoin  You must not get pregnant while taking isotretinoin  Once the medication is out of your system, 30 days, there is no effect on the baby   Increased pressure in the brain  Call your doctor right away if you experience bad headache, blurred vision, dizziness, nausea or vomiting, or seizures   Skin rash - call your doctor right away if you develop any rashes or blisters on the skin      Liver damage - call your doctor right away if you experience severe stomach, chest or bowel pain, painful swallowing, diarrhea, blood in your stool, yellowing of your skin or eyes, or dark urine   Gastrointestinal bleeding  If you experience unusual abdominal pain or red or black/tarry stools, call your doctor immediately  You should also notify your doctor if you or a family member has a history of ulcerative colitis or Crohns disease   Worsening acne  Mild worsening of acne can occur in the first few weeks of using isotreinoin  If your acne is getting significantly worse, call your doctor  This may require temporarily stopping the isotretinoin and possibly adding other medications  XEROSIS ("DRY SKIN")    Dry skin refers to skin that feels dry to touch  Dry skin has a dull surface with a rough, scaly quality  The skin is less pliable and cracked  When dryness is severe, the skin may become inflamed and fissured  Although any body site can be dry, dry skin tends to affect the shins more than any other site  Dry skin is lacking moisture in the outer horny cell layer (stratum corneum) and this results in cracks in the skin surface  Dry skin is also called xerosis, xeroderma or asteatosis (lack of fat)  It can affect males and females of all ages  There is some racial variability in water and lipid content of the skin   Dry skin that starts in early childhood may be one of about 20 types of ichthyosis (fish-scale skin)  There is often a family history of dry skin   Dry skin is commonly seen in people with atopic dermatitis   Nearly everyone > 60 years has dry skin  Dry skin that begins later may be seen in people with certain diseases and conditions   Postmenopausal women   Hypothyroidism   Chronic renal disease    Malnutrition and weight loss    Subclinical dermatitis    Treatment with certain drugs such as oral retinoids, diuretics and epidermal growth factor receptor inhibitors    People exposed to a dry environment may experience dry skin     Low humidity: in desert climates or cool, windy conditions    Excessive air conditioning    Direct heat from a fire or fan heater    Excessive bathing    Contact with soap, detergents and solvents    Inappropriate topical agents such as alcohol    Frictional irritation from rough clothing or abrasives    Dry skin is due to abnormalities in the integrity of the barrier function of the stratum corneum, which is made up of corneocytes   There is an overall reduction in the lipids in the stratum corneum   Ratio of ceramides, cholesterol and free fatty acids may be normal or altered   There may be a reduction in the proliferation of keratinocytes   Keratinocyte subtypes change in dry skin with a decrease in keratins K1, K10 and increase in K5, K14     Involucrin (a protein) may be expressed early, increasing cell stiffness   The result is retention of corneocytes and reduced water-holding capacity  What is the treatment for dry skin? The mainstay of treatment of dry skin and ichthyosis is moisturisers/emollients  They should be applied liberally and often enough to:   Reduce itch    Improve the barrier function    Prevent entry of irritants, bacteria    Reduce transepidermal water loss  When considering which emollient is most suitable, consider:   Severity of the dryness    Tolerance    Personal preference    Cost and availability  Emollients generally work best if applied to damp skin, if pH is below 7 (acidic), and if containing humectants such as urea or propylene glycol  Additional treatments include:   Topical steroid if itchy or there is dermatitis -- choose an emollient base    Topical calcineurin inhibitors if topical steroids are unsuitable  How can dry skin be prevented? Eliminate aggravating factors:   Reduce the frequency of bathing   A humidifier in winter and air conditioner in summer    Compare having a short shower with a prolonged soak in a bath   Use lukewarm, not hot, water      Replace standard soap with a substitute such as a synthetic detergent cleanser, water-miscible emollient, bath oil, anti-pruritic tar oil, colloidal oatmeal etc     Apply an emollient liberally and often, particularly shortly after bathing, and when itchy  The drier the skin, the thicker this should be, especially on the hands  What is the outlook for dry skin? A tendency to dry skin may persist life-long, or it may improve once contributing factors are controlled      Scribe Attestation    I,:  Oseas Schmitt am acting as a scribe while in the presence of the attending physician :       I,:  Mo Randolph MD personally performed the services described in this documentation    as scribed in my presence :

## 2021-05-27 DIAGNOSIS — L70.0 ACNE VULGARIS: ICD-10-CM

## 2021-05-27 RX ORDER — ISOTRETINOIN 30 MG/1
CAPSULE ORAL
Qty: 60 CAPSULE | Refills: 0 | Status: SHIPPED | OUTPATIENT
Start: 2021-05-27 | End: 2021-06-23 | Stop reason: SDUPTHER

## 2021-06-23 ENCOUNTER — TELEMEDICINE (OUTPATIENT)
Dept: DERMATOLOGY | Facility: CLINIC | Age: 31
End: 2021-06-23
Payer: COMMERCIAL

## 2021-06-23 DIAGNOSIS — Z79.899 HIGH RISK MEDICATION USE: ICD-10-CM

## 2021-06-23 DIAGNOSIS — L70.0 ACNE VULGARIS: Primary | ICD-10-CM

## 2021-06-23 DIAGNOSIS — Z51.81 MEDICATION MONITORING ENCOUNTER: ICD-10-CM

## 2021-06-23 DIAGNOSIS — H02.729 HYPOTRICHOSIS OF EYELID, UNSPECIFIED LATERALITY: ICD-10-CM

## 2021-06-23 DIAGNOSIS — L85.3 XEROSIS OF SKIN: ICD-10-CM

## 2021-06-23 PROCEDURE — 99214 OFFICE O/P EST MOD 30 MIN: CPT | Performed by: DERMATOLOGY

## 2021-06-23 PROCEDURE — 1036F TOBACCO NON-USER: CPT | Performed by: DERMATOLOGY

## 2021-06-23 RX ORDER — BIMATOPROST 3 UG/ML
SOLUTION TOPICAL
Qty: 5 ML | Refills: 2 | Status: SHIPPED | OUTPATIENT
Start: 2021-06-23 | End: 2022-03-09 | Stop reason: ALTCHOICE

## 2021-06-23 RX ORDER — ISOTRETINOIN 30 MG/1
CAPSULE ORAL
Qty: 60 CAPSULE | Refills: 0 | Status: SHIPPED | OUTPATIENT
Start: 2021-06-23 | End: 2022-02-18

## 2021-06-23 NOTE — PROGRESS NOTES
Virtual Regular Visit      Assessment/Plan:    Problem List Items Addressed This Visit     None      Visit Diagnoses     Acne vulgaris    -  Primary    Relevant Medications    bimatoprost (LATISSE) 0 03 % ophthalmic solution    ISOtretinoin (Myorisan) 30 MG capsule    Xerosis of skin        Relevant Medications    bimatoprost (LATISSE) 0 03 % ophthalmic solution    ISOtretinoin (Myorisan) 30 MG capsule    High risk medication use        Relevant Medications    ISOtretinoin (Myorisan) 30 MG capsule    Medication monitoring encounter        Relevant Medications    ISOtretinoin (Myorisan) 30 MG capsule    Hypotrichosis of eyelid, unspecified laterality        Relevant Medications    bimatoprost (LATISSE) 0 03 % ophthalmic solution    ISOtretinoin (Myorisan) 30 MG capsule               Reason for visit is   Chief Complaint   Patient presents with    Virtual Regular Visit        Encounter provider Tyson Mercado MD    Provider located at [address]  [phone]      Recent Visits  No visits were found meeting these conditions  Showing recent visits within past 7 days and meeting all other requirements  Today's Visits  Date Type Provider Dept   06/23/21 Telemedicine Tyson Mercado MD [facility] [address] [place] [address] today's visits and meeting all other requirements  Future Appointments  No visits were found meeting these conditions  Showing future appointments within next 150 days and meeting all other requirements       The patient was identified by name and date of birth  Rati Umang Ornelas was informed that this is a telemedicine visit and that the visit is being conducted through [address] [place] [address] and patient was informed that this is not a secure, HIPAA-compliant platform  She agrees to proceed     My office door was closed  The patient was notified the following individuals were present in the room Marika Fly, [employer]    She acknowledged consent and understanding of privacy and security of the video platform  The patient has agreed to participate and understands they can discontinue the visit at any time  Patient is aware this is a billable service  Subjective  Cesia Sarabia is a 33 y o  female see below dermatology note   HPI     Past Medical History:   Diagnosis Date    Acne     Miscarriage        Past Surgical History:   Procedure Laterality Date    NO PAST SURGERIES         Current Outpatient Medications   Medication Sig Dispense Refill    cetirizine (ZyrTEC) 10 mg tablet Take 10 mg by mouth daily      Iron-Vitamin C (IRON 100/C PO) Take by mouth      ISOtretinoin (Myorisan) 30 MG capsule TAKE 2 CAPSULES BY MOUTH EVERY DAY, I [place] # [id number],  1990 60 capsule 0    metoclopramide (Reglan) 10 mg tablet       Multiple Vitamins-Minerals (multivitamin with minerals) tablet Take 1 tablet by mouth daily      norgestimate-ethinyl estradiol (ORTHO-CYCLEN) 0 25-35 MG-MCG per tablet Take one pill by mouth daily  30 tablet 3    Omega-3 Fatty Acids (fish oil) 1,000 mg Take 1,000 mg by mouth daily      valACYclovir (VALTREX) 500 mg tablet Take 500 mg by mouth 2 (two) times a day      vitamin B-12 (CYANOCOBALAMIN) 100 MCG TABS Take 50 mcg by mouth daily      VITAMIN D PO Take by mouth      bimatoprost (LATISSE) 0 03 % ophthalmic solution Place one drop on applicator and apply evenly along the skin of the upper eyelid at base of eyelashes once daily at bedtime; repeat procedure for second eye (use a clean applicator)  5 mL 2    Prenatal Vit-Fe Fumarate-FA (prenatal vitamin) 28-0 8 mg Take 1 tablet by mouth daily (Patient not taking: Reported on 2021) 100 tablet 3    spironolactone (ALDACTONE) 25 mg tablet Take 1 tablet (25 mg total) by mouth daily (Patient not taking: Reported on 3/1/2021) 30 tablet 2     No current facility-administered medications for this visit          No Known Allergies    Review of Systems    Video Exam    There were no vitals filed for this visit  Physical Exam     I spent 5 minutes directly with the patient during this visit      VIRTUAL VISIT DISCLAIMER    Cesia Pollardmyles Smith acknowledges that she has consented to an online visit or consultation  She understands that the online visit is based solely on information provided by her, and that, in the absence of a face-to-face physical evaluation by the physician, the diagnosis she receives is both limited and provisional in terms of accuracy and completeness  This is not intended to replace a full medical face-to-face evaluation by the physician  Cesia Smtih understands and accepts these terms  [facility] Follow Up Note    Patient Name: Maritza Trivedi  Encounter Date: 2021    Today's Chief Concerns:  Yunier Willis Concern #1:  F/U Accutane     Current Medications:    Current Outpatient Medications:     cetirizine (ZyrTEC) 10 mg tablet, Take 10 mg by mouth daily, Disp: , Rfl:     Iron-Vitamin C (IRON 100/C PO), Take by mouth, Disp: , Rfl:     ISOtretinoin (Myorisan) 30 MG capsule, TAKE 2 CAPSULES BY MOUTH EVERY DAY, I Pledge # [id number],  1990, Disp: 60 capsule, Rfl: 0    metoclopramide (Reglan) 10 mg tablet, , Disp: , Rfl:     Multiple Vitamins-Minerals (multivitamin with minerals) tablet, Take 1 tablet by mouth daily, Disp: , Rfl:     norgestimate-ethinyl estradiol (ORTHO-CYCLEN) 0 25-35 MG-MCG per tablet, Take one pill by mouth daily  , Disp: 30 tablet, Rfl: 3    Omega-3 Fatty Acids (fish oil) 1,000 mg, Take 1,000 mg by mouth daily, Disp: , Rfl:     valACYclovir (VALTREX) 500 mg tablet, Take 500 mg by mouth 2 (two) times a day, Disp: , Rfl:     vitamin B-12 (CYANOCOBALAMIN) 100 MCG TABS, Take 50 mcg by mouth daily, Disp: , Rfl:     VITAMIN D PO, Take by mouth, Disp: , Rfl:     bimatoprost (LATISSE) 0 03 % ophthalmic solution, Place one drop on applicator and apply evenly along the skin of the upper eyelid at base of eyelashes once daily at bedtime; repeat procedure for second eye (use a clean applicator)  , Disp: 5 mL, Rfl: 2    Prenatal Vit-Fe Fumarate-FA (prenatal vitamin) 28-0 8 mg, Take 1 tablet by mouth daily (Patient not taking: Reported on 5/26/2021), Disp: 100 tablet, Rfl: 3    spironolactone (ALDACTONE) 25 mg tablet, Take 1 tablet (25 mg total) by mouth daily (Patient not taking: Reported on 3/1/2021), Disp: 30 tablet, Rfl: 2    CONSTITUTIONAL:   There were no vitals filed for this visit  Specific Alerts:    Have you been seen by a [facility] Dermatologist in the last 3 years? YES    Are you pregnant or planning to become pregnant? N/A    Are you currently or planning to be nursing or breast feeding? No    No Known Allergies    May we call your Preferred Phone number to discuss your specific medical information? YES    May we leave a detailed message that includes your specific medical information? YES    Have you traveled outside of the [facility] in the past 3 months? No    Do you currently have a pacemaker or defibrillator? No    Do you have any artificial heart valves, joints, plates, screws, rods, stents, pins, etc? No   - If Yes, were any placed within the last 2 years? Do you require any medications prior to a surgical procedure? No   - If Yes, for which procedure? - If Yes, what medications to you require? Are you taking any medications that cause you to bleed more easily ("blood thinners") No    Have you ever experienced a rapid heartbeat with epinephrine? No    Have you ever been treated with "gold" (gold sodium thiomalate) therapy? No    [facility] can help with wrinkles, "laugh lines," facial volume loss, "double chin," "love handles," age spots, and more  Are you interested in learning today about some of the skin enhancement procedures that we offer?  (If Yes, please provide more detail) No      PSYCH: Normal mood and affect  EYES: Normal conjunctiva  ENT: Normal lips and oral mucosa  CARDIOVASCULAR: No edema  RESPIRATORY: Normal respirations  HEME/LYMPH/IMMUNO:  No regional lymphadenopathy except as noted below in ASSESSMENT AND PLAN BY DIAGNOSIS    FULL ORGAN SYSTEM SKIN EXAM (SKIN)   Face Normal except as noted below in Assessment   Neck, Cervical Chain Nodes    Right Arm/Hand/Fingers    Left Arm/Hand/Fingers    Chest/Breasts/Axillae    Abdomen, Umbilicus    Back/Spine    Groin/Genitalia/Buttocks    Right Leg, Foot, Toes    Left Leg, Foot, Toes        1  ISOTRETINOIN "ACCUTANE": FEMALE    Age: 33 y o  Weight (in KILOgrams): 63 6 kg  Pulse: virutal  Blood Pressure: virtual    Ipledge number: [phone]  Last 4 digits SS: [id number]    Contraception Type 1: hormonal combination oral contraceptive pill  Contraception Type 2: male latex condom  Patient reminded that this must be listed in same order on iPledge  Yes       "Goal Dose" in mg (125 mg x weight in kg): 7,762 5 mg    Interim month   "Daily Dose" of Isotretinoin the patient has actually been taking since last visit (this is usually what was prescribed): 60 mg   "Total # of Days" patient took Isotretinoin since last visit (this is usually 30):  30 days   "Total Monthly Dose" in mg since last visit (this equals "Daily Dose" x "Total # of Days"): 1,800 mg    Cumulative dosage   "Total cumulative Dose" in mg (add the above "Total Monthly Dose" with "Total Cumulative Dose" from last visit: 6,600 mg        Symptoms   Conjunctivitis: No   Night Blindness/ Issues with night vision: No   Focusing Problems: No   Dry Lips/Eyes: YES   Dry Skin: YES   Rash: No   Nose Bleeds: No   Angular cheilitis (cracking in corner of lips): YES   Headaches: No   Photosensitivity: YES   Dry Anus: No   Depression: No   Mood Changes:  Yes   Suicidal thoughts: No   Fatigue: YES   Weight Loss: No   Muscle Pain/Stiffness: YES   Abdominal pain: No   Diarrhea: No   Bloody stools: No    Physical Exam   Psychiatric/Mood: Normal   Anatomic Location Affected:  Cheeks, Chin   Morphological Description:  o Open/Closed Comedones:  - No evidence ("Clear")  o Inflammatory Papules/Pustules:  - No evidence ("Clear")  o Nodules:  - No evidence ("Clear")  o Scarring:  - No evidence ("Clear")  o Excoriations:  - No evidence ("Clear")  o Local Skin Redness/Erythema:  - No evidence ("Clear")  o Local Skin Dryness/Scaling:  - No evidence ("Clear")  o Local Skin Dyspigmentation:  - No evidence ("Clear")      Labs   Date of last labs: 4/26/2021   Completed: YES   Labs reviewed: within normal limits   Pregnancy test: urine pregnancy at home because of COVID 19  - Result: negative  - Interpretation- pregnant: No      Additional History of Present Condition:  Patient feels this dose of Accutane is actually helping  Patient has at home pregnancy test to show today  Patient reports no labs we required  Side effects are dry eyes, lips, cracking in the corner of her lips, some mood swings and muscle stiffness  Patient has only had one month with no pimples    DIAGNOSES:     Acne Vulgaris   High Risk Medication   Medication Monitoring   Xerosis (see below for patient education)    Assessment and Plan:   Target Total Dose per KILOgram:  125 mg/KILOgram (this may change this on a patient-by-patient basis)   Planned daily dose for next 30 days: 60 mg  Patient does not need labs but will complete at home pregnancy test for appointment   (please remember to add patient's "Ipledge number" to actual prescription):  [phone]   Return to clinic in about 1 month, please review ipledge guidelines in terms of timing (see below for patient education)   Get labs 1-2 days before next appointment: YES   Patient confirmed in iPledge: YES   Patients counseled:  - Cannot donate blood while taking isotretinoin and for 1 month after completing therapy  YES  - Do not share medication with anyone   YES  - Possible side effects discussed, including sun sensitivity, dry lips/eyes/skin, headaches, blurry vision (pseudotumor cerebri), muscle/joint aches, GI upset, bloody stools (IBD including Crohns/Ulcerative Colitis), jaundice, liver dysfunction, lipid abnormalities, bone marrow dysfunction, mood effects, thoughts of hurting oneself or others, and flaring of acne (acne fulminans/SAPPHO)  YES  - Females cannot get pregnant and must be on two forms of birth control while on therapy and at least one month after  YES  - Report any side effects of mood swings or depression and stop medication upon occurrence  YES  - Patient needs to confirm counseling in iPledge prior to picking up prescription  YES    - she will do a home pregnancy test and show it to us on facetime    Isotretinoin for Acne   Isotretinoin is a retinoid medication that is taken by mouth to treat severe nodular acne  Typically, it is used once other acne treatments have not worked, such as oral antibiotics  Usually isotretinoin is taken for 4 to 6 months, although the length of treatment can vary from person to person  While most patients acne improves and may even clear with this medication, in 20% of patients acne can come back  This requires additional acne treatment or even a second cycle of isotretinoin  How should I take isotretinoin?  Isotretinoin dosing is weight-based and should be taken exactly as prescribed   If you miss a dose, skip that dose  Do not take 2 doses at the same time   Take with food to help with absorption   All instructions in the iPLEDGE program packet (www [employer]) that was provided must be followed (see below for highlights)   You will get a 30 day supply of isotretinoin at a time  It cannot be automatically refilled  Make certain that you have been given enough medication to last 30 days as pharmacists are unable to refill or make changes within a month     You must return to your dermatologist every month to make sure you are not having any serious side effects from isotretinoin  For female patients, this visit will always include a monthly pregnancy test  Other laboratory studies, including liver function tests, cholesterol and triglycerides, must also be conducted before and during treatment  What should I avoid while taking isotretinoin?  Do not get pregnant from one month prior or 1 month following taking any isotretinoin   Do not donate blood while take isotretinoin or until 1 months after coming off the medication   Do not have cosmetic procedures to smooth your skin, including waxing, dermabrasion, or laser procedures, while taking this medication and for at least 6 months after you stop   Do not share isotretinoin with any other people  It can cause birth defects and other serious health problems   Do not use any other acne medications, including medicated washes, cleansers, creams or antibiotic pills during your treatment with isotretinoin unless expressly directed to by your dermatologist      Initiating isotretinoin & the iPLEDGE Program    The iPLEDGE Program is a strict, government-required program to prevent females from becoming pregnant while on isotretinoin  All females and males must participate  Note: Your provider must follow this program and cannot change any of the requirements   Before starting isotretinoin, your provider will talk to you about the safe use of this medication and you will need to sign consent forms in order to receive treatment   If you fail to keep appointments, you will be unable to get your prescription filled  Yunier Willis For females of childbearing age:      angelia Miller must be on two specific forms of birth control before starting isotretinoin  Your provider must get 2 negative pregnancy tests, at least 30 days apart, before you can proceed with the medication  The second pregnancy test must be obtained within 5 days of the menstrual cycle   If you choose not to be sexually active during treatment, you still must have the 2 negative pregnancy tests    o You must answer a series of questions either online or by phone every month prior to getting the prescription  o Monthly prescriptions must be filled within 7 days of that month's pregnancy test   It is important that you notify your physician well before the 7th day if there are any unforeseen delays, such as prior authorizations  o For more information, visit: [email or web address]    What are the possible side effects of isotretinoin? What should I do? Most side effects from isotretinoin are mild and can be easily improved with simple remedies  Others are more concerning   Dry skin and eyes, chapped lips and dry nose that may lead to nosebleeds  o Dry Skin: Apply sensitive skin moisturizers to dry skin at least two times a day  You may need sunscreen (SPF 30) in the morning and to reapply when outside  o Dry Eyes: Use saline eye drops or artificial tears  o Dry Nose/Nosebleeds: Use saline nasal spray (ex  Ayr) during the day and at bedtime  To stop nosebleeds, hold pressure  If this does not work, call your dermatologist     o Chapped lips: apply petrolatum-based lip balms routinely  Avoid anything medicated   Contact your dermatologist for excessive dryness, cracks, tenderness or pain   Increased blood fats and cholesterol (usually in patients with a personal or family history of cholesterol or triglyceride problems)   Vision problems affecting your ability to see in the dark and drive at night   Bone, muscle and tendon aches can occur  Additional stretching before and after activities may help relieve aches  If you are otherwise healthy, consider the use of ibuprofen or naproxen    If you are unsure if you can use these pain medications, ask your doctor first  Also, call your doctor if you experience severe back pain, joint pain, or a broken bone  Changes in mood, including anxiety, depressive symptoms or suicidal thoughts which may or may not be temporary  Notify your doctor if your or a family member have suffered from these conditions or if you have any concerns during treatment   Serious birth defects or miscarriage can occur while taking this medication and for one month after taking your last dose of isotretinoin  You must not get pregnant while taking isotretinoin  Once the medication is out of your system, 30 days, there is no effect on the baby   Increased pressure in the brain  Call your doctor right away if you experience bad headache, blurred vision, dizziness, nausea or vomiting, or seizures   Skin rash - call your doctor right away if you develop any rashes or blisters on the skin   Liver damage - call your doctor right away if you experience severe stomach, chest or bowel pain, painful swallowing, diarrhea, blood in your stool, yellowing of your skin or eyes, or dark urine   Gastrointestinal bleeding  If you experience unusual abdominal pain or red or black/tarry stools, call your doctor immediately  You should also notify your doctor if you or a family member has a history of ulcerative colitis or Crohns disease   Worsening acne  Mild worsening of acne can occur in the first few weeks of using isotreinoin  If your acne is getting significantly worse, call your doctor  This may require temporarily stopping the isotretinoin and possibly adding other medications  XEROSIS ("DRY SKIN")    Dry skin refers to skin that feels dry to touch  Dry skin has a dull surface with a rough, scaly quality  The skin is less pliable and cracked  When dryness is severe, the skin may become inflamed and fissured  Although any body site can be dry, dry skin tends to affect the shins more than any other site      Dry skin is lacking moisture in the outer horny cell layer (stratum corneum) and this results in cracks in the skin surface  Dry skin is also called xerosis, xeroderma or asteatosis (lack of fat)  It can affect males and females of all ages  There is some racial variability in water and lipid content of the skin   Dry skin that starts in early childhood may be one of about 20 types of ichthyosis (fish-scale skin)  There is often a family history of dry skin   Dry skin is commonly seen in people with atopic dermatitis   Nearly everyone > 60 years has dry skin  Dry skin that begins later may be seen in people with certain diseases and conditions   Postmenopausal women   Hypothyroidism   Chronic renal disease    Malnutrition and weight loss    Subclinical dermatitis    Treatment with certain drugs such as oral retinoids, diuretics and epidermal growth factor receptor inhibitors    People exposed to a dry environment may experience dry skin   Low humidity: in desert climates or cool, windy conditions    Excessive air conditioning    Direct heat from a fire or fan heater    Excessive bathing    Contact with soap, detergents and solvents    Inappropriate topical agents such as alcohol    Frictional irritation from rough clothing or abrasives    Dry skin is due to abnormalities in the integrity of the barrier function of the stratum corneum, which is made up of corneocytes   There is an overall reduction in the lipids in the stratum corneum   Ratio of ceramides, cholesterol and free fatty acids may be normal or altered   There may be a reduction in the proliferation of keratinocytes   Keratinocyte subtypes change in dry skin with a decrease in keratins K1, K10 and increase in K5, K14     Involucrin (a protein) may be expressed early, increasing cell stiffness   The result is retention of corneocytes and reduced water-holding capacity  What is the treatment for dry skin? The mainstay of treatment of dry skin and ichthyosis is moisturisers/emollients   They should be applied liberally and often enough to:   Reduce itch    Improve the barrier function    Prevent entry of irritants, bacteria    Reduce transepidermal water loss  When considering which emollient is most suitable, consider:   Severity of the dryness    Tolerance    Personal preference    Cost and availability  Emollients generally work best if applied to damp skin, if pH is below 7 (acidic), and if containing humectants such as urea or propylene glycol  Additional treatments include:   Topical steroid if itchy or there is dermatitis -- choose an emollient base    Topical calcineurin inhibitors if topical steroids are unsuitable  How can dry skin be prevented? Eliminate aggravating factors:   Reduce the frequency of bathing   A humidifier in winter and air conditioner in summer    Compare having a short shower with a prolonged soak in a bath   Use lukewarm, not hot, water   Replace standard soap with a substitute such as a synthetic detergent cleanser, water-miscible emollient, bath oil, anti-pruritic tar oil, colloidal oatmeal etc     Apply an emollient liberally and often, particularly shortly after bathing, and when itchy  The drier the skin, the thicker this should be, especially on the hands  What is the outlook for dry skin? A tendency to dry skin may persist life-long, or it may improve once contributing factors are controlled      Scribe Attestation    I,:  Eugenio Downey am acting as a scribe while in the presence of the attending physician :       I,:  Dale Eric MD personally performed the services described in this documentation    as scribed in my presence :

## 2021-06-23 NOTE — PATIENT INSTRUCTIONS
Assessment and Plan:   Target Total Dose per KILOgram:  125 mg/KILOgram (this may change this on a patient-by-patient basis)   Planned daily dose for next 30 days: 60 mg  Patient does not need labs but will complete at home pregnancy test for appointment   (please remember to add patient's "Ipledge number" to actual prescription):  [phone]   Return to clinic in about 1 month, please review ipledge guidelines in terms of timing (see below for patient education)   Get labs 1-2 days before next appointment: YES   Patient confirmed in iPledge: YES   Patients counseled:  - Cannot donate blood while taking isotretinoin and for 1 month after completing therapy  YES  - Do not share medication with anyone  YES  - Possible side effects discussed, including sun sensitivity, dry lips/eyes/skin, headaches, blurry vision (pseudotumor cerebri), muscle/joint aches, GI upset, bloody stools (IBD including Crohns/Ulcerative Colitis), jaundice, liver dysfunction, lipid abnormalities, bone marrow dysfunction, mood effects, thoughts of hurting oneself or others, and flaring of acne (acne fulminans/SAPPHO)  YES  - Females cannot get pregnant and must be on two forms of birth control while on therapy and at least one month after  YES  - Report any side effects of mood swings or depression and stop medication upon occurrence  YES  Patient needs to confirm counseling in iPledge prior to picking up prescription  YES  1 Month f/u 7/22/2021 @ 12:40, virtual appointment

## 2021-07-22 ENCOUNTER — OFFICE VISIT (OUTPATIENT)
Dept: DERMATOLOGY | Facility: CLINIC | Age: 31
End: 2021-07-22
Payer: COMMERCIAL

## 2021-07-22 DIAGNOSIS — Z79.899 HIGH RISK MEDICATION USE: ICD-10-CM

## 2021-07-22 DIAGNOSIS — L85.3 XEROSIS OF SKIN: Primary | ICD-10-CM

## 2021-07-22 DIAGNOSIS — L70.0 ACNE CYSTICA: ICD-10-CM

## 2021-07-22 DIAGNOSIS — L70.0 ACNE VULGARIS: ICD-10-CM

## 2021-07-22 DIAGNOSIS — Z51.81 MEDICATION MONITORING ENCOUNTER: ICD-10-CM

## 2021-07-22 DIAGNOSIS — L85.3 XEROSIS CUTIS: ICD-10-CM

## 2021-07-22 PROCEDURE — 1036F TOBACCO NON-USER: CPT | Performed by: DERMATOLOGY

## 2021-07-22 PROCEDURE — 99214 OFFICE O/P EST MOD 30 MIN: CPT | Performed by: DERMATOLOGY

## 2021-07-22 NOTE — PROGRESS NOTES
Virtual Regular Visit    Verification of patient location:    Patient is currently located in the state of [place]  Patient is currently located in a state in which I am licensed    Assessment/Plan:    Problem List Items Addressed This Visit     None      Visit Diagnoses     Xerosis of skin    -  Primary    Relevant Medications    hydrocortisone 2 5 % ointment    Other Relevant Orders    Lipid panel    Hepatic function panel    Acne vulgaris        Relevant Medications    hydrocortisone 2 5 % ointment    Other Relevant Orders    Lipid panel    Hepatic function panel    High risk medication use        Relevant Medications    hydrocortisone 2 5 % ointment    Other Relevant Orders    Lipid panel    Hepatic function panel    Medication monitoring encounter        Relevant Medications    hydrocortisone 2 5 % ointment    Other Relevant Orders    Lipid panel    Hepatic function panel    Xerosis cutis        Relevant Medications    hydrocortisone 2 5 % ointment    Other Relevant Orders    Lipid panel    Hepatic function panel    Acne cystica        Relevant Medications    hydrocortisone 2 5 % ointment    Other Relevant Orders    Lipid panel    Hepatic function panel               Reason for visit is   Chief Complaint   Patient presents with    Virtual Regular Visit        Encounter provider Amy Griffin MD    Provider located at [address]  [phone]      Recent Visits  No visits were found meeting these conditions  Showing recent visits within past 7 days and meeting all other requirements  Today's Visits  Date Type Provider Dept   07/22/21 Office Visit Amy Griffin MD [facility] [address] [place] [address] today's visits and meeting all other requirements  Future Appointments  No visits were found meeting these conditions    Showing future appointments within next 150 days and meeting all other requirements       The patient was identified by name and date of birth  Cesia Rudd was informed that this is a telemedicine visit and that the visit is being conducted through [facility] and patient was informed that this is not a secure, HIPAA-compliant platform  She agrees to proceed     My office door was closed  The patient was notified the following individuals were present in the room Noelle Musa, [facility]  She acknowledged consent and understanding of privacy and security of the video platform  The patient has agreed to participate and understands they can discontinue the visit at any time  Patient is aware this is a billable service  Subjective  Rati Sabi Rudd is a 33 y o  female follow up acne   HPI     Past Medical History:   Diagnosis Date    Acne     Miscarriage        Past Surgical History:   Procedure Laterality Date    NO PAST SURGERIES         Current Outpatient Medications   Medication Sig Dispense Refill    bimatoprost (LATISSE) 0 03 % ophthalmic solution Place one drop on applicator and apply evenly along the skin of the upper eyelid at base of eyelashes once daily at bedtime; repeat procedure for second eye (use a clean applicator)  5 mL 2    cetirizine (ZyrTEC) 10 mg tablet Take 10 mg by mouth daily      hydrocortisone 2 5 % ointment Apply topically 2 (two) times a day Apply topically on affected dry areas only as needed  30 g 1    Iron-Vitamin C (IRON 100/C PO) Take by mouth      ISOtretinoin (Myorisan) 30 MG capsule TAKE 2 CAPSULES BY MOUTH EVERY DAY, I Pledge # [id number],  1990 60 capsule 0    metoclopramide (Reglan) 10 mg tablet       Multiple Vitamins-Minerals (multivitamin with minerals) tablet Take 1 tablet by mouth daily      norgestimate-ethinyl estradiol (ORTHO-CYCLEN) 0 25-35 MG-MCG per tablet Take one pill by mouth daily   30 tablet 3    Omega-3 Fatty Acids (fish oil) 1,000 mg Take 1,000 mg by mouth daily      Prenatal Vit-Fe Fumarate-FA (prenatal vitamin) 28-0 8 mg Take 1 tablet by mouth daily (Patient not taking: Reported on 5/26/2021) 100 tablet 3    spironolactone (ALDACTONE) 25 mg tablet Take 1 tablet (25 mg total) by mouth daily (Patient not taking: Reported on 3/1/2021) 30 tablet 2    valACYclovir (VALTREX) 500 mg tablet Take 500 mg by mouth 2 (two) times a day      vitamin B-12 (CYANOCOBALAMIN) 100 MCG TABS Take 50 mcg by mouth daily      VITAMIN D PO Take by mouth       No current facility-administered medications for this visit  No Known Allergies    Review of Systems    Video Exam    There were no vitals filed for this visit  Physical Exam     I spent 5 minutes directly with the patient during this visit    VIRTUAL VISIT DISCLAIMER      Rati Zahra Holder verbally agrees to participate in [employer]  Pt is aware that Virtual Care Services could be limited without vital signs or the ability to perform a full hands-on physical Audconnor Ortega understands she or the provider may request at any time to terminate the video visit and request the patient to seek care or treatment in person  [facility] VIRTUAL Clinic Note     Patient Name: Cherelle Morgan  Encounter Date: 7/22/2021  If patient is a minor, he/she is accompanied, virtually, by person claiming to be:     Current Medications:   (please update all dermatological medications before printing patient's AVS!)      Current Outpatient Medications:     bimatoprost (LATISSE) 0 03 % ophthalmic solution, Place one drop on applicator and apply evenly along the skin of the upper eyelid at base of eyelashes once daily at bedtime; repeat procedure for second eye (use a clean applicator)  , Disp: 5 mL, Rfl: 2    cetirizine (ZyrTEC) 10 mg tablet, Take 10 mg by mouth daily, Disp: , Rfl:     hydrocortisone 2 5 % ointment, Apply topically 2 (two) times a day Apply topically on affected dry areas only as needed  , Disp: 30 g, Rfl: 1    Iron-Vitamin C (IRON 100/C PO), Take by mouth, Disp: , Rfl:     ISOtretinoin (Myorisan) 30 MG capsule, TAKE 2 CAPSULES BY MOUTH EVERY DAY, I Dimpledgmyles # [id number],  1990, Disp: 60 capsule, Rfl: 0    metoclopramide (Reglan) 10 mg tablet, , Disp: , Rfl:     Multiple Vitamins-Minerals (multivitamin with minerals) tablet, Take 1 tablet by mouth daily, Disp: , Rfl:     norgestimate-ethinyl estradiol (ORTHO-CYCLEN) 0 25-35 MG-MCG per tablet, Take one pill by mouth daily  , Disp: 30 tablet, Rfl: 3    Omega-3 Fatty Acids (fish oil) 1,000 mg, Take 1,000 mg by mouth daily, Disp: , Rfl:     Prenatal Vit-Fe Fumarate-FA (prenatal vitamin) 28-0 8 mg, Take 1 tablet by mouth daily (Patient not taking: Reported on 2021), Disp: 100 tablet, Rfl: 3    spironolactone (ALDACTONE) 25 mg tablet, Take 1 tablet (25 mg total) by mouth daily (Patient not taking: Reported on 3/1/2021), Disp: 30 tablet, Rfl: 2    valACYclovir (VALTREX) 500 mg tablet, Take 500 mg by mouth 2 (two) times a day, Disp: , Rfl:     vitamin B-12 (CYANOCOBALAMIN) 100 MCG TABS, Take 50 mcg by mouth daily, Disp: , Rfl:     VITAMIN D PO, Take by mouth, Disp: , Rfl:       Review of Systems:  Have you recently had or currently have any of the following? If YES, what are you doing for the problem? · Fever, chills or unintended weight loss: No  · Sudden loss or change in your vision: No  · Nausea, vomiting or blood in your stool: No  · Painful or swollen joints: No  · Wheezing or cough: No  · Changing mole or non-healing wound: No  · Nosebleeds: No  · Excessive sweating: No  · Easy or prolonged bleeding? No  · Over the last 2 weeks, how often have you been bothered by the following problems? · Taking little interest or pleasure in doing things: 1 - Not at All  · Feeling down, depressed, or hopeless: 1 - Not at All  · Rapid heartbeat with epinephrine:  No    · FEMALES ONLY:    · Are you pregnant or planning to become pregnant?  YES, in the future   · Are you currently or planning to be nursing or breast feeding? YES    · Any known allergies? No Known Allergies      Physical Exam:     Was a chaperone (Derm Clinical Assistant) present throughout the entire virtual Physical Exam? Yes     Did the Dermatology Team specifically  the patient on the technical and practical limitations of a virtual Physical Exam? Yes}  o Did the patient ultimately request or accept a virtual Physical Exam?  NO  o Did the patient specifically refuse to have the areas "under-the-bra" examined by the Dermatologist? Deb Zurita  o Did the patient specifically refuse to have the areas "under-the-underwear" examined by the Dermatologist? YES    CONSTITUTIONAL:   Appearance: alert, well appearing, and in no distress  PSYCH: Normal mood and affect  EYES: Normal appearing eyes with normal color; no obvious deformities  ENT: Normal ears, nose, lips and neck with normal color and no obvious deformities; no obvious difficulty swallowing  CARDIOVASCULAR: No obvious edema; no obvious jugular venous distension  RESPIRATORY: Normal appearing respirations; no obvious shortness of breath  HEME/LYMPH/IMMUNO:  Normal color without obvious pallor, jaundice, petechiae or bleeding; no obvious cervical chain masses    SKIN:  FULL ORGAN SYSTEM EXAM  Hair, Scalp, Ears, Face Normal except as noted below in Assessment        ISOTRETINOIN "ACCUTANE"    Age: 33 y o  Weight (in KILOgrams): Virtual appointment  Unable to obtain  Pulse: Virtual appointment  Unable to obtain  Blood Pressure: Virtual appointment  Unable to obtain  Ipledge number: [phone]  Last 4 digits SS: [id number]    Contraception Type 1: hormonal combination oral contraceptive pill  Contraception Type 2: male latex condom  Patient reminded that this must be listed in same order on iPledge   Yes       "Goal Dose" in mg (125-150 mg x weight in kg): 7,762 5  mg  Interim month   "Daily Dose" of Isotretinoin the patient has actually been taking since last visit (this is usually what was prescribed): 60 mg   "Total # of Days" patient took Isotretinoin since last visit (this is usually 30):  30 days   "Total Monthly Dose" in mg since last visit (this equals "Daily Dose" x "Total # of Days"): 1,800 mg    Cumulative dosage   "Total cumulative Dose" in mg (add the above "Total Monthly Dose" with "Total Cumulative Dose" from last visit: 8,400 mg        Symptoms   Conjunctivitis: No   Night Blindness/ Issues with night vision: No   Focusing Problems: No   Dry Lips/Eyes: YES   Dry Skin: YES   Rash: No   Nose Bleeds: No   Angular cheilitis (cracking in corner of lips): YES   Headaches: No   Photosensitivity: No   Dry Anus: No   Depression: No   Mood Changes: No   Suicidal thoughts: No   Fatigue: No   Weight Loss: No   Muscle Pain/Stiffness: No   Abdominal pain: No   Diarrhea: No   Bloody stools: No    Physical Exam   Anatomic Location Affected:  face, chest, back   Morphological Description: clear with mild erythema       Labs    No results found for: CHOL  Lab Results   Component Value Date    HDL 87 04/26/2021    HDL 94 01/29/2021     Lab Results   Component Value Date    LDLCALC 109 (H) 04/26/2021    LDLCALC 92 01/29/2021     Lab Results   Component Value Date    TRIG 149 04/26/2021    TRIG 46 04/02/2021    TRIG 55 01/29/2021     No results found for: CHOLHDL    Lab Results   Component Value Date    ALT 18 04/26/2021    AST 15 04/26/2021    ALKPHOS 71 04/26/2021       Lab Results   Component Value Date    PREGSERUM Negative 04/26/2021    HCGQUANT <2 04/02/2021       DIAGNOSES:     Acne Vulgaris   High Risk Medication - on Accutane therapy    Xerosis (see below for patient education)    Assessment and Plan:    Acne vulgaris, on isotretinoin    Planned daily dose for next 30 days: None   - [facility]  - Gentle skin care and regular SPF use   Stop all other acne treatments while on isotretinoin   -Recommend daily emollient for lips - Side effects including but not limited to serious allergic reaction, thrombosis, mood changes, skeletal hyperostosis, premature epiphyseal closure, IBD, bone and muscle pain, HA, hyperlipidemia, photosensitivity, hepatotoxicity, dry skin and eyes, hair loss, teratogenicity (pregnancy category X) risk for up to 1 month after stopping medicine, 20-30% risk of recurrence were reviewed  - The patient understands not to donate blood, drink excessive amounts of alcohol, or share the medication with any individuals   - LFTs and lipid panel next due ending of this month  Follow up as needed if starts breaking out  Patient will plan to conceive in the future and has completed course of Accutane  Recommend patient to wait a whole month before trying to conceive   Patients counseled:  - Cannot donate blood while taking isotretinoin and for 1 month after completing therapy  YES  - Do not share medication with anyone  YES  - Report any side effects of mood swings or depression and stop medication upon occurrence  YES  - [facility] to [facility] prescription  YES        Isotretinoin for Acne   Isotretinoin is a retinoid medication that is taken by mouth to treat severe nodular acne  Typically, it is used once other acne treatments have not worked, such as oral antibiotics  Usually isotretinoin is taken for 4 to 6 months, although the length of treatment can vary from person to person  While most patients acne improves and may even clear with this medication, in 20% of patients acne can come back  This requires additional acne treatment or even a second cycle of isotretinoin  How should I take isotretinoin?  Isotretinoin dosing is weight-based and should be taken exactly as prescribed   If you miss a dose, skip that dose  Do not take 2 doses at the same time   Take with fatty food to help with absorption     You will get a 30 day supply of isotretinoin at a time  It cannot be automatically refilled  Make certain that you have been given enough medication to last 30 days as pharmacists are unable to refill or make changes within a month   You must return to your dermatologist every month to make sure you are not having any serious side effects from isotretinoin  For female patients, this visit will always include a monthly pregnancy test  Other laboratory studies, including liver function tests, cholesterol and triglycerides, must also be conducted before and during treatment  What should I avoid while taking isotretinoin?  Do not get pregnant from one month prior or 1 month following taking any isotretinoin   Do not donate blood while take isotretinoin or until 1 months after coming off the medication   Do not have cosmetic procedures to smooth your skin, including waxing, dermabrasion, or laser procedures, while taking this medication and for at least 6 months after you stop   Do not share isotretinoin with any other people  It can cause birth defects and other serious health problems   Do not use any other acne medications, including medicated washes, cleansers, creams or antibiotic pills during your treatment with isotretinoin unless expressly directed to by your dermatologist      Initiating isotretinoin & the iPLEDGE Program    The iPLEDGE Program is a strict, government-required program to prevent females from becoming pregnant while on isotretinoin  All females and males must participate  Note: Your provider must follow this program and cannot change any of the requirements   If you fail to keep appointments, you will be unable to get your prescription filled  Skinny Ree For females of childbearing age:      o Randalyn Dereck must be on two specific forms of birth control   o You must answer a series of questions either online or by phone every month prior to getting the prescription    o Monthly prescriptions must be filled within 7 days of that month's pregnancy test   It is important that you notify your physician well before the 7th day if there are any unforeseen delays, such as prior authorizations  o For more information, visit: [email or web address]    What are the possible side effects of isotretinoin? What should I do? Most side effects from isotretinoin are mild and can be easily improved with simple remedies  Others are more concerning   Dry skin and eyes, chapped lips and dry nose that may lead to nosebleeds  o Dry Skin: Apply sensitive skin moisturizers to dry skin at least two times a day  You may need sunscreen (SPF 30) in the morning and to reapply when outside  o Dry Eyes: Use saline eye drops or artificial tears  o Dry Nose/Nosebleeds: Use saline nasal spray (ex  Ayr) during the day and at bedtime  To stop nosebleeds, hold pressure  If this does not work, call your dermatologist     o Chapped lips: apply petrolatum-based lip balms routinely  Avoid anything medicated   Contact your dermatologist for excessive dryness, cracks, tenderness or pain   Increased blood fats and cholesterol (usually in patients with a personal or family history of cholesterol or triglyceride problems)   Vision problems affecting your ability to see in the dark and drive at night   Bone, muscle and tendon aches can occur  Additional stretching before and after activities may help relieve aches  If you are otherwise healthy, consider the use of ibuprofen or naproxen  If you are unsure if you can use these pain medications, ask your doctor first  Also, call your doctor if you experience severe back pain, joint pain, or a broken bone    Changes in mood, including anxiety, depressive symptoms or suicidal thoughts which may or may not be temporary  Notify your doctor if your or a family member have suffered from these conditions or if you have any concerns during treatment      Serious birth defects or miscarriage can occur while taking this medication and for one month after taking your last dose of isotretinoin  You must not get pregnant while taking isotretinoin  Once the medication is out of your system, 30 days, there is no effect on the baby   Increased pressure in the brain  Call your doctor right away if you experience bad headache, blurred vision, dizziness, nausea or vomiting, or seizures   Skin rash - call your doctor right away if you develop any rashes or blisters on the skin   Liver damage - call your doctor right away if you experience severe stomach, chest or bowel pain, painful swallowing, diarrhea, blood in your stool, yellowing of your skin or eyes, or dark urine   Gastrointestinal bleeding  If you experience unusual abdominal pain or red or black/tarry stools, call your doctor immediately   Worsening acne  Mild worsening of acne can occur in the first few weeks of using isotreinoin  If your acne is getting significantly worse, call your doctor        Scribe Attestation    I,:  David Madrigal MA am acting as a scribe while in the presence of the attending physician :       I,:  Alin Wiggins MD personally performed the services described in this documentation    as scribed in my presence :

## 2021-07-22 NOTE — PATIENT INSTRUCTIONS
 Acne Vulgaris   High Risk Medication - on Accutane therapy    Xerosis (see below for patient education)    Assessment and Plan:    Acne vulgaris, on isotretinoin    Planned daily dose for next 30 days: None   - Confirmed patient status and counseling in [facility] today  - Gentle skin care and regular SPF use  Stop all other acne treatments while on isotretinoin   -Recommend daily emollient for lips   - Side effects including but not limited to serious allergic reaction, thrombosis, mood changes, skeletal hyperostosis, premature epiphyseal closure, IBD, bone and muscle pain, HA, hyperlipidemia, photosensitivity, hepatotoxicity, dry skin and eyes, hair loss, teratogenicity (pregnancy category X) risk for up to 1 month after stopping medicine, 20-30% risk of recurrence were reviewed  - The patient understands not to donate blood, drink excessive amounts of alcohol, or share the medication with any individuals   - LFTs and lipid panel next due is ending of this month  Follow up as needed if starts breaking out  Patient will plan to conceive in the future and has completed course of Accutane  Recommend patient to wait a whole month before trying to conceive   Patients counseled:  - Cannot donate blood while taking isotretinoin and for 1 month after completing therapy  YES  - Do not share medication with anyone  YES  - Report any side effects of mood swings or depression and stop medication upon occurrence  YES  - Patient needs to confirm counseling in [facility] prior to picking up prescription  YES        Isotretinoin for Acne   Isotretinoin is a retinoid medication that is taken by mouth to treat severe nodular acne  Typically, it is used once other acne treatments have not worked, such as oral antibiotics  Usually isotretinoin is taken for 4 to 6 months, although the length of treatment can vary from person to person    While most patients acne improves and may even clear with this medication, in 20% of patients acne can come back  This requires additional acne treatment or even a second cycle of isotretinoin  How should I take isotretinoin?  Isotretinoin dosing is weight-based and should be taken exactly as prescribed   If you miss a dose, skip that dose  Do not take 2 doses at the same time   Take with fatty food to help with absorption   You will get a 30 day supply of isotretinoin at a time  It cannot be automatically refilled  Make certain that you have been given enough medication to last 30 days as pharmacists are unable to refill or make changes within a month   You must return to your dermatologist every month to make sure you are not having any serious side effects from isotretinoin  For female patients, this visit will always include a monthly pregnancy test  Other laboratory studies, including liver function tests, cholesterol and triglycerides, must also be conducted before and during treatment  What should I avoid while taking isotretinoin?  Do not get pregnant from one month prior or 1 month following taking any isotretinoin   Do not donate blood while take isotretinoin or until 1 months after coming off the medication   Do not have cosmetic procedures to smooth your skin, including waxing, dermabrasion, or laser procedures, while taking this medication and for at least 6 months after you stop   Do not share isotretinoin with any other people  It can cause birth defects and other serious health problems   Do not use any other acne medications, including medicated washes, cleansers, creams or antibiotic pills during your treatment with isotretinoin unless expressly directed to by your dermatologist      Initiating isotretinoin & the iPLEDGE Program    The iPLEDGE Program is a strict, government-required program to prevent females from becoming pregnant while on isotretinoin  All females and males must participate   Note: Your provider must follow this program and cannot change any of the requirements   If you fail to keep appointments, you will be unable to get your prescription filled  Claribel Uriostegui For females of childbearing age:      o Mel Gan must be on two specific forms of birth control   o You must answer a series of questions either online or by phone every month prior to getting the prescription  o Monthly prescriptions must be filled within 7 days of that month's pregnancy test   It is important that you notify your physician well before the 7th day if there are any unforeseen delays, such as prior authorizations  o For more information, visit: [email or web address] [facility]/    What are the possible side effects of isotretinoin? What should I do? Most side effects from isotretinoin are mild and can be easily improved with simple remedies  Others are more concerning   Dry skin and eyes, chapped lips and dry nose that may lead to nosebleeds  o Dry Skin: Apply sensitive skin moisturizers to dry skin at least two times a day  You may need sunscreen (SPF 30) in the morning and to reapply when outside  o Dry Eyes: Use saline eye drops or artificial tears  o Dry Nose/Nosebleeds: Use saline nasal spray (ex  Ayr) during the day and at bedtime  To stop nosebleeds, hold pressure  If this does not work, call your dermatologist     o Chapped lips: apply petrolatum-based lip balms routinely  Avoid anything medicated   Contact your dermatologist for excessive dryness, cracks, tenderness or pain   Increased blood fats and cholesterol (usually in patients with a personal or family history of cholesterol or triglyceride problems)   Vision problems affecting your ability to see in the dark and drive at night   Bone, muscle and tendon aches can occur  Additional stretching before and after activities may help relieve aches  If you are otherwise healthy, consider the use of ibuprofen or naproxen    If you are unsure if you can use these pain medications, ask your doctor first  Also, call your doctor if you experience severe back pain, joint pain, or a broken bone    Changes in mood, including anxiety, depressive symptoms or suicidal thoughts which may or may not be temporary  Notify your doctor if your or a family member have suffered from these conditions or if you have any concerns during treatment   Serious birth defects or miscarriage can occur while taking this medication and for one month after taking your last dose of isotretinoin  You must not get pregnant while taking isotretinoin  Once the medication is out of your system, 30 days, there is no effect on the baby   Increased pressure in the brain  Call your doctor right away if you experience bad headache, blurred vision, dizziness, nausea or vomiting, or seizures   Skin rash - call your doctor right away if you develop any rashes or blisters on the skin   Liver damage - call your doctor right away if you experience severe stomach, chest or bowel pain, painful swallowing, diarrhea, blood in your stool, yellowing of your skin or eyes, or dark urine   Gastrointestinal bleeding  If you experience unusual abdominal pain or red or black/tarry stools, call your doctor immediately   Worsening acne  Mild worsening of acne can occur in the first few weeks of using isotreinoin  If your acne is getting significantly worse, call your doctor

## 2021-08-17 ENCOUNTER — APPOINTMENT (OUTPATIENT)
Dept: LAB | Facility: CLINIC | Age: 31
End: 2021-08-17
Payer: COMMERCIAL

## 2021-08-17 DIAGNOSIS — L70.0 ACNE VULGARIS: ICD-10-CM

## 2021-08-17 DIAGNOSIS — Z79.899 HIGH RISK MEDICATION USE: ICD-10-CM

## 2021-08-17 DIAGNOSIS — L85.3 XEROSIS CUTIS: ICD-10-CM

## 2021-08-17 DIAGNOSIS — Z51.81 MEDICATION MONITORING ENCOUNTER: ICD-10-CM

## 2021-08-17 DIAGNOSIS — L85.3 XEROSIS OF SKIN: ICD-10-CM

## 2021-08-17 DIAGNOSIS — L70.0 ACNE CYSTICA: ICD-10-CM

## 2021-08-17 LAB
ALBUMIN SERPL BCP-MCNC: 3.4 G/DL (ref 3.5–5)
ALP SERPL-CCNC: 71 U/L (ref 46–116)
ALT SERPL W P-5'-P-CCNC: 15 U/L (ref 12–78)
AST SERPL W P-5'-P-CCNC: 16 U/L (ref 5–45)
BILIRUB DIRECT SERPL-MCNC: 0.09 MG/DL (ref 0–0.2)
BILIRUB SERPL-MCNC: 0.25 MG/DL (ref 0.2–1)
CHOLEST SERPL-MCNC: 229 MG/DL (ref 50–200)
HDLC SERPL-MCNC: 98 MG/DL
LDLC SERPL CALC-MCNC: 113 MG/DL (ref 0–100)
NONHDLC SERPL-MCNC: 131 MG/DL
PROT SERPL-MCNC: 7.1 G/DL (ref 6.4–8.2)
TRIGL SERPL-MCNC: 90 MG/DL

## 2021-08-17 PROCEDURE — 36415 COLL VENOUS BLD VENIPUNCTURE: CPT

## 2021-08-17 PROCEDURE — 80076 HEPATIC FUNCTION PANEL: CPT

## 2021-08-17 PROCEDURE — 80061 LIPID PANEL: CPT

## 2021-10-26 ENCOUNTER — TELEPHONE (OUTPATIENT)
Dept: OBGYN CLINIC | Facility: CLINIC | Age: 31
End: 2021-10-26

## 2021-11-01 DIAGNOSIS — N91.2 AMENORRHEA: ICD-10-CM

## 2021-11-01 RX ORDER — PNV NO.95/FERROUS FUM/FOLIC AC 28MG-0.8MG
1 TABLET ORAL DAILY
Qty: 100 TABLET | Refills: 3 | Status: SHIPPED | OUTPATIENT
Start: 2021-11-01 | End: 2022-05-03 | Stop reason: SDUPTHER

## 2022-01-11 ENCOUNTER — TELEPHONE (OUTPATIENT)
Dept: OBGYN CLINIC | Facility: CLINIC | Age: 32
End: 2022-01-11

## 2022-01-11 DIAGNOSIS — R11.2 NAUSEA AND VOMITING, INTRACTABILITY OF VOMITING NOT SPECIFIED, UNSPECIFIED VOMITING TYPE: Primary | ICD-10-CM

## 2022-01-11 RX ORDER — METOCLOPRAMIDE 10 MG/1
10 TABLET ORAL 3 TIMES DAILY
Qty: 30 TABLET | Refills: 1 | Status: SHIPPED | OUTPATIENT
Start: 2022-01-11 | End: 2022-01-17 | Stop reason: DRUGHIGH

## 2022-01-11 NOTE — TELEPHONE ENCOUNTER
5 weeks gestation, has nausea and vomiting would like Reglan called in to [facility] pharmacy

## 2022-01-11 NOTE — TELEPHONE ENCOUNTER
Patient called she has nausea and vomiting and is not able to eat, you did prescribe Reglan last time for her, and she would like to know if you can call this in for her ,please advise

## 2022-01-17 ENCOUNTER — TELEPHONE (OUTPATIENT)
Dept: OBGYN CLINIC | Facility: CLINIC | Age: 32
End: 2022-01-17

## 2022-01-17 DIAGNOSIS — O21.9 NAUSEA AND VOMITING DURING PREGNANCY: Primary | ICD-10-CM

## 2022-01-17 RX ORDER — ONDANSETRON 4 MG/1
4 TABLET, ORALLY DISINTEGRATING ORAL EVERY 6 HOURS PRN
Qty: 20 TABLET | Refills: 2 | Status: SHIPPED | OUTPATIENT
Start: 2022-01-17 | End: 2022-02-08 | Stop reason: SDUPTHER

## 2022-01-17 NOTE — TELEPHONE ENCOUNTER
Early gestation, reglan is nit helping she feels nauseated all the time, has tried B 6 , crackers, otc medication  Etc, nothing is Helping, can only eat soups and toast and bland foods  ,

## 2022-01-26 ENCOUNTER — TELEPHONE (OUTPATIENT)
Dept: OBGYN CLINIC | Facility: CLINIC | Age: 32
End: 2022-01-26

## 2022-01-26 DIAGNOSIS — O21.9 NAUSEA/VOMITING IN PREGNANCY: Primary | ICD-10-CM

## 2022-01-26 RX ORDER — METOCLOPRAMIDE 10 MG/1
10 TABLET ORAL 3 TIMES DAILY
Qty: 30 TABLET | Refills: 3 | Status: SHIPPED | OUTPATIENT
Start: 2022-01-26 | End: 2022-05-03 | Stop reason: ALTCHOICE

## 2022-01-26 NOTE — TELEPHONE ENCOUNTER
Spoke with the patient about recommendations, Okay we can add Reglan to Zofran to see if it will help with her symptom also she can use vitamin B6 over-the-counter if symptoms not improved feeling weak tired fatigue need to go to the ER for IV hydration

## 2022-01-26 NOTE — TELEPHONE ENCOUNTER
Okay we can add Reglan to Zofran to see if it will help with her symptom also she can use vitamin B6 over-the-counter if symptoms not improved feeling weak tired fatigue need to go to the ER for IV hydration

## 2022-01-26 NOTE — TELEPHONE ENCOUNTER
Pt 8 weeks pregnant, c/o severe nausea/ vomiting, prescribed Zofran By Chikis Soriano last Thursday, still no improvement, did well on it for the first 3 days but symptoms are back since Saturday  Pt states she had lost 12 lbs in the last 2 weeks

## 2022-02-08 ENCOUNTER — TELEPHONE (OUTPATIENT)
Dept: OBGYN CLINIC | Facility: CLINIC | Age: 32
End: 2022-02-08

## 2022-02-08 NOTE — TELEPHONE ENCOUNTER
Rosario Young from [facility] called he sent a form over for prior authorization for  Medication ondansetron (Zofran ODT) 4 mg disintegrating tablet [[id number]]     Order Details  Dose: 4 mg Route: Oral Frequency: Every 6 hours PRN for nausea, vomiting   Dispense Quantity: 20 tablet Refills: 2          Sig: Take 1 tablet (4 mg total) by mouth every 6 (six) hours as needed for nausea or vomiting     He needs clinical documentation  As well as quantity prescribed limit information  And needs ICD 10 codes for this medication

## 2022-02-09 NOTE — TELEPHONE ENCOUNTER
Herminia Burton from [facility] called he sent a form over for prior authorization for  Medication ondansetron (Zofran ODT) 4 mg disintegrating tablet [[id number]]     Order Details  Dose: 4 mg Route: Oral Frequency: Every 6 hours PRN for nausea, vomiting   Dispense Quantity: 20 tablet Refills: 2             Sig: Take 1 tablet (4 mg total) by mouth every 6 (six) hours as needed for nausea or vomiting      He needs clinical documentation  As well as quantity prescribed limit information  And needs ICD 10 codes for this medication

## 2022-02-11 ENCOUNTER — OFFICE VISIT (OUTPATIENT)
Dept: OBGYN CLINIC | Facility: CLINIC | Age: 32
End: 2022-02-11
Payer: COMMERCIAL

## 2022-02-11 VITALS
DIASTOLIC BLOOD PRESSURE: 72 MMHG | BODY MASS INDEX: 23.74 KG/M2 | HEIGHT: 62 IN | SYSTOLIC BLOOD PRESSURE: 126 MMHG | WEIGHT: 129 LBS

## 2022-02-11 DIAGNOSIS — Z01.419 ROUTINE GYNECOLOGICAL EXAMINATION: Primary | ICD-10-CM

## 2022-02-11 DIAGNOSIS — Z34.90 PREGNANCY, UNSPECIFIED GESTATIONAL AGE: ICD-10-CM

## 2022-02-11 DIAGNOSIS — Z36.9 FIRST TRIMESTER SCREENING: ICD-10-CM

## 2022-02-11 DIAGNOSIS — Z11.51 SCREENING FOR HUMAN PAPILLOMAVIRUS: ICD-10-CM

## 2022-02-11 DIAGNOSIS — Z11.3 SCREENING FOR STDS (SEXUALLY TRANSMITTED DISEASES): ICD-10-CM

## 2022-02-11 DIAGNOSIS — Z12.4 SCREENING FOR MALIGNANT NEOPLASM OF THE CERVIX: ICD-10-CM

## 2022-02-11 PROCEDURE — G0476 HPV COMBO ASSAY CA SCREEN: HCPCS | Performed by: OBSTETRICS & GYNECOLOGY

## 2022-02-11 PROCEDURE — G0145 SCR C/V CYTO,THINLAYER,RESCR: HCPCS | Performed by: OBSTETRICS & GYNECOLOGY

## 2022-02-11 PROCEDURE — 87591 N.GONORRHOEAE DNA AMP PROB: CPT | Performed by: OBSTETRICS & GYNECOLOGY

## 2022-02-11 PROCEDURE — 87491 CHLMYD TRACH DNA AMP PROBE: CPT | Performed by: OBSTETRICS & GYNECOLOGY

## 2022-02-11 PROCEDURE — S0612 ANNUAL GYNECOLOGICAL EXAMINA: HCPCS | Performed by: OBSTETRICS & GYNECOLOGY

## 2022-02-11 NOTE — TELEPHONE ENCOUNTER
Spoke with patient made aware paperwork for prior auth was faexd and awaking a response, she picked up the medication,

## 2022-02-11 NOTE — PROGRESS NOTES
Amy Callaway George Goltz is a 27 y o  female who presents for annual well woman exam   lmp 12/3/2021  Menstrual History:  OB History        1    Para        Term                AB   1    Living           SAB        IAB        Ectopic        Multiple        Live Births                      Patient's last menstrual period was 2021  Period Cycle (Days): 28  Period Duration (Days): 5-6  Period Pattern: Regular  Menstrual Flow:  (varies)  Dysmenorrhea: None    The following portions of the patient's history were reviewed and updated as appropriate: allergies, current medications, past family history, past medical history, past social history, past surgical history and problem list     Review of Systems  Review of Systems   Constitutional: Negative for activity change, appetite change, chills, fatigue and fever  Respiratory: Negative for cough and shortness of breath  Cardiovascular: Negative for chest pain, palpitations and leg swelling  Gastrointestinal: Positive for constipation, nausea and vomiting  Negative for abdominal pain and diarrhea  Genitourinary: Negative for difficulty urinating, dysuria, flank pain, frequency, hematuria, urgency and vaginal discharge  Neurological: Negative for dizziness and headaches  Psychiatric/Behavioral: Negative for confusion            Objective      /72 (BP Location: Left arm, Patient Position: Sitting, Cuff Size: Adult)   Ht 5' 2" (1 575 m)   Wt 58 5 kg (129 lb)   LMP 2021   BMI 23 59 kg/m²     Physical Exam  OBGyn Exam     General:   alert and oriented, in no acute distress, alert, appears stated age and cooperative   Heart: regular rate and rhythm, S1, S2 normal, no murmur, click, rub or gallop   Lungs: clear to auscultation bilaterally   Abdomen: soft, non-tender, without masses or organomegaly   Vulva: normal   Vagina: normal mucosa, normal discharge   Cervix: no cervical motion tenderness and no lesions   Uterus: normal size   Adnexa:  Breast Exam:  normal adnexa  breasts appear normal, no suspicious masses, no skin or nipple changes or axillary nodes  bedside ultrasound performed confirm fetal viability IUP 10 weeks fetal heart rate 158 beats per minutes appropriate for gestational age with LMP       Assessment      @well woman@       27year-old female  Annual exam  Amenorrhea/LMP December 3rd  IUP 10 weeks  Prior miscarriage  Blood type  positive  Up-to-date with COVID vaccine  Plan  Pap/HPV  GC/CT  Prenatal vitamin daily  Diet/exercise  Return to office for C OC   All questions answered  There are no Patient Instructions on file for this visit

## 2022-02-14 LAB
C TRACH DNA SPEC QL NAA+PROBE: NEGATIVE
N GONORRHOEA DNA SPEC QL NAA+PROBE: NEGATIVE

## 2022-02-18 ENCOUNTER — INITIAL PRENATAL (OUTPATIENT)
Dept: OBGYN CLINIC | Facility: CLINIC | Age: 32
End: 2022-02-18

## 2022-02-18 VITALS — WEIGHT: 129 LBS | BODY MASS INDEX: 23.74 KG/M2 | HEIGHT: 62 IN

## 2022-02-18 DIAGNOSIS — Z34.01 PRIMIGRAVIDA IN FIRST TRIMESTER: ICD-10-CM

## 2022-02-18 DIAGNOSIS — Z36.9 FIRST TRIMESTER SCREENING: ICD-10-CM

## 2022-02-18 DIAGNOSIS — Z33.1 NORMAL PREGNANCY, INCIDENTAL: Primary | ICD-10-CM

## 2022-02-18 LAB
LAB AP GYN PRIMARY INTERPRETATION: NORMAL
Lab: NORMAL

## 2022-02-18 PROCEDURE — OBC

## 2022-02-18 NOTE — PROGRESS NOTES
OB Intake    Patient presents for OB intake interview  Accompanied by: Phone intake   Planned  pregnancy, FOB involved and supportive      Hx of  delivery prior to 36 weeks 6 days: No  Hx of hypertension:  No  Patient's last menstrual period was 2021  Estimated Date of Delivery: 12/3/2021  Signs and Symptoms of pregnancy:  - No swelling or dizziness, no visual problems, has nausea and vomiting   - Constipation: yes  - Headaches: yes, occasionally   - Cramping/spotting: some cramping, no spotting   - PICA cravings: No  - Cats:Yes  - Diabetes:    History of gestational diabetes : No   BMI >35  : No   Advance maternal age >35 : No   First degree relative with type 2 diabetes :  No   History of PCOS : No   Current metformin use : No   Prior history of macrosomia or LGA : No    Prenatal labs including: CBC,ABO, Rubella, Hepatitis, RPR,HIV, Cystic fibrosis and spinal muscular atrophy carrier screen, varicella, hemoglobin electrophoresis, Thyroid, toxoplasmosis  Last Pap:  2022  Tdap - counseled to be given after 27 weeks  Influenza vaccine discussed and information sheet given  Vaccinated:No  COVId Vaccine : Booster,2021  Hx of MRSA: No  Dental visit with last 6 months - yes, recommendations discussed  Interview education:  Damon Dose Pregnancy Essentials booklet reviewed and explained  Handouts given at today's visit     [facility]   [address]  [facility] support center   Damon Dose Maternal Fetal Medicine        Sequential screening pamphlet                   Cystic fibrosis information    [facility]: yes  ONAF form submitted: No    Mychart activated: yes    Interview done by : Manjinder Crawford LPN

## 2022-02-22 ENCOUNTER — TELEPHONE (OUTPATIENT)
Dept: OBGYN CLINIC | Facility: CLINIC | Age: 32
End: 2022-02-22

## 2022-02-22 DIAGNOSIS — Z34.91 PREGNANT AND NOT YET DELIVERED, FIRST TRIMESTER: Primary | ICD-10-CM

## 2022-02-22 RX ORDER — CHOLECALCIFEROL (VITAMIN D3) 25 MCG
1 TABLET,CHEWABLE ORAL DAILY
Qty: 100 CAPSULE | Refills: 3 | Status: SHIPPED | OUTPATIENT
Start: 2022-02-22

## 2022-02-22 NOTE — TELEPHONE ENCOUNTER
Wants to know if she should take a vitamin with a dha, the one that was prescribed does not have it in

## 2022-02-23 ENCOUNTER — APPOINTMENT (OUTPATIENT)
Dept: LAB | Facility: CLINIC | Age: 32
End: 2022-02-23
Payer: COMMERCIAL

## 2022-02-23 DIAGNOSIS — Z33.1 NORMAL PREGNANCY, INCIDENTAL: ICD-10-CM

## 2022-02-23 LAB
ABO GROUP BLD: NORMAL
BASOPHILS # BLD AUTO: 0.03 THOUSANDS/ΜL (ref 0–0.1)
BASOPHILS NFR BLD AUTO: 0 % (ref 0–1)
BILIRUB UR QL STRIP: NEGATIVE
CLARITY UR: CLEAR
COLOR UR: YELLOW
EOSINOPHIL # BLD AUTO: 0.03 THOUSAND/ΜL (ref 0–0.61)
EOSINOPHIL NFR BLD AUTO: 0 % (ref 0–6)
ERYTHROCYTE [DISTWIDTH] IN BLOOD BY AUTOMATED COUNT: 14.3 % (ref 11.6–15.1)
GLUCOSE UR STRIP-MCNC: NEGATIVE MG/DL
HBV SURFACE AG SER QL: NORMAL
HCT VFR BLD AUTO: 36.9 % (ref 34.8–46.1)
HGB BLD-MCNC: 12.2 G/DL (ref 11.5–15.4)
HGB UR QL STRIP.AUTO: NEGATIVE
IMM GRANULOCYTES # BLD AUTO: 0.05 THOUSAND/UL (ref 0–0.2)
IMM GRANULOCYTES NFR BLD AUTO: 0 % (ref 0–2)
KETONES UR STRIP-MCNC: NEGATIVE MG/DL
LEUKOCYTE ESTERASE UR QL STRIP: NEGATIVE
LYMPHOCYTES # BLD AUTO: 1.64 THOUSANDS/ΜL (ref 0.6–4.47)
LYMPHOCYTES NFR BLD AUTO: 13 % (ref 14–44)
MCH RBC QN AUTO: 29.6 PG (ref 26.8–34.3)
MCHC RBC AUTO-ENTMCNC: 33.1 G/DL (ref 31.4–37.4)
MCV RBC AUTO: 90 FL (ref 82–98)
MONOCYTES # BLD AUTO: 0.49 THOUSAND/ΜL (ref 0.17–1.22)
MONOCYTES NFR BLD AUTO: 4 % (ref 4–12)
NEUTROPHILS # BLD AUTO: 10.15 THOUSANDS/ΜL (ref 1.85–7.62)
NEUTS SEG NFR BLD AUTO: 83 % (ref 43–75)
NITRITE UR QL STRIP: NEGATIVE
NRBC BLD AUTO-RTO: 0 /100 WBCS
PH UR STRIP.AUTO: 6 [PH]
PLATELET # BLD AUTO: 298 THOUSANDS/UL (ref 149–390)
PMV BLD AUTO: 10.6 FL (ref 8.9–12.7)
PROT UR STRIP-MCNC: NEGATIVE MG/DL
RBC # BLD AUTO: 4.12 MILLION/UL (ref 3.81–5.12)
RH BLD: POSITIVE
RUBV IGG SERPL IA-ACNC: 57.2 IU/ML
SP GR UR STRIP.AUTO: >=1.03 (ref 1–1.03)
TSH SERPL DL<=0.05 MIU/L-ACNC: 0.95 UIU/ML (ref 0.36–3.74)
UROBILINOGEN UR QL STRIP.AUTO: 0.2 E.U./DL
WBC # BLD AUTO: 12.39 THOUSAND/UL (ref 4.31–10.16)

## 2022-02-23 PROCEDURE — 36415 COLL VENOUS BLD VENIPUNCTURE: CPT

## 2022-02-23 PROCEDURE — 86901 BLOOD TYPING SEROLOGIC RH(D): CPT

## 2022-02-23 PROCEDURE — 86900 BLOOD TYPING SEROLOGIC ABO: CPT

## 2022-02-23 PROCEDURE — 86778 TOXOPLASMA ANTIBODY IGM: CPT

## 2022-02-23 PROCEDURE — 86777 TOXOPLASMA ANTIBODY: CPT

## 2022-02-23 PROCEDURE — 86762 RUBELLA ANTIBODY: CPT

## 2022-02-23 PROCEDURE — 87340 HEPATITIS B SURFACE AG IA: CPT

## 2022-02-23 PROCEDURE — 84443 ASSAY THYROID STIM HORMONE: CPT

## 2022-02-23 PROCEDURE — 86592 SYPHILIS TEST NON-TREP QUAL: CPT

## 2022-02-23 PROCEDURE — 86787 VARICELLA-ZOSTER ANTIBODY: CPT

## 2022-02-23 PROCEDURE — 81003 URINALYSIS AUTO W/O SCOPE: CPT

## 2022-02-23 PROCEDURE — 87389 HIV-1 AG W/HIV-1&-2 AB AG IA: CPT

## 2022-02-23 PROCEDURE — 87086 URINE CULTURE/COLONY COUNT: CPT

## 2022-02-23 PROCEDURE — 85025 COMPLETE CBC W/AUTO DIFF WBC: CPT

## 2022-02-23 NOTE — TELEPHONE ENCOUNTER
New prescription called with DHEA not sure if insurance covers that otherwise she can use over-the-counter prenatal vitamin with DHEA

## 2022-02-23 NOTE — TELEPHONE ENCOUNTER
Spoke with  the patient and made aware of recommendations ,New prescription called with DHEA not sure if insurance covers that otherwise she can use over-the-counter prenatal vitamin with DHEA

## 2022-02-24 LAB
BACTERIA UR CULT: NORMAL
CLINICAL COMMENT: NORMAL
HIV 1+2 AB+HIV1 P24 AG SERPL QL IA: NORMAL
RPR SER QL: NORMAL
T GONDII IGG SERPL IA-ACNC: <3 IU/ML (ref 0–7.1)
T GONDII IGM SER IA-ACNC: 3.9 AU/ML (ref 0–7.9)
VZV IGG SER IA-ACNC: NORMAL

## 2022-03-09 ENCOUNTER — ROUTINE PRENATAL (OUTPATIENT)
Dept: PERINATAL CARE | Facility: OTHER | Age: 32
End: 2022-03-09
Payer: COMMERCIAL

## 2022-03-09 VITALS
DIASTOLIC BLOOD PRESSURE: 82 MMHG | HEART RATE: 106 BPM | BODY MASS INDEX: 24.69 KG/M2 | HEIGHT: 62 IN | WEIGHT: 134.2 LBS | SYSTOLIC BLOOD PRESSURE: 130 MMHG

## 2022-03-09 DIAGNOSIS — Z36.82 NUCHAL TRANSLUCENCY OF FETUS ON PRENATAL ULTRASOUND: Primary | ICD-10-CM

## 2022-03-09 DIAGNOSIS — Z3A.13 13 WEEKS GESTATION OF PREGNANCY: ICD-10-CM

## 2022-03-09 PROCEDURE — 3008F BODY MASS INDEX DOCD: CPT | Performed by: OBSTETRICS & GYNECOLOGY

## 2022-03-09 PROCEDURE — 1036F TOBACCO NON-USER: CPT | Performed by: OBSTETRICS & GYNECOLOGY

## 2022-03-09 PROCEDURE — 99202 OFFICE O/P NEW SF 15 MIN: CPT | Performed by: OBSTETRICS & GYNECOLOGY

## 2022-03-09 PROCEDURE — 76813 OB US NUCHAL MEAS 1 GEST: CPT | Performed by: OBSTETRICS & GYNECOLOGY

## 2022-03-09 NOTE — LETTER
2022     Po Keenan, [address]  [facility] [id number]    Patient: Anna Rosales   YOB: 1990   Date of Visit: 3/9/2022       Dear Dr Doris Beck:    Thank you for referring Mima Gaspar to me for evaluation  Below are my notes for this consultation  If you have questions, please do not hesitate to call me  I look forward to following your patient along with you  Sincerely,        Marilyn Joya MD        CC: No Recipients  Marilyn Joya MD  3/11/2022  5:41 AM  Sign when Signing Visit  OFFICE CONSULT  Referring physician:   Po Keenan Md  [facility] [address]  [address] [facility],  [address] [facility]      Dear Dr Doris Beck      Thank you for requesting a  consultation on your patient Ms  Anna Rosales for the following indications:  Genetic screening    History  Medications:  Zofran ODT 4 milligrams every 6 hours as needed for nausea in pregnancy, prenatal vitamins daily, Reglan 3 times a day  Allergies to medications:  None  Past medical history:  Denies significant medical history  Past surgical history:  [place] tooth removed  Past obstetrical history:  6- 8 week miscarriage spontaneously 2020  Social history:  Denies substance use  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  First generation family history:  She does not report any significant 1st generation family history on her intake form but epic suggests her dad has hypertension and pre gestational diabetes    Ultrasound findings: The ultrasound shows a fetus concordant with dates  The nasal bone and nuchal translucency appears normal  No malformations are seen on today's early ultrasound  The patient was informed of the findings and counseled about the limitations of the exam in detecting all forms of fetal congenital abnormalities  She does not report any vaginal bleeding or uterine cramping or contractions        Specific counseling was provided on the following problems:  1  We discussed the options for genetic screening which include invasive testing on the fetal placenta or on fetal skin cells within the amniotic fluid and compared this to noninvasive testing which includes cell free DNA screening and the sequential screen  We reviewed the risks, the benefits and the limitations of each  In the end patient chose to complete the cell free DNA screen  2  With her risk factors of being delivered near the age of 28, and her family history of hypertension recommend baby aspirin 162 milligrams daily which can decrease the risk of developing preeclampsia in pregnancy Offered baby aspirin   3  We discussed that Pepcid ( over the counter) can be used for reflux symptoms that might improve her symptoms of nausea in pregnancy      Future tests recommended:  1  The results of her NIPT will return in 7-10 days and her OB office will order an MSAFP screen at 16-18 weeks to screen for spina bifida  Future ultrasounds ordered today:   1  Fetal Level II ultrasound imaging is recommended at 19-20 weeks' gestation      The face to face time, in addition to time spent discussing ultrasound results, was approximately 15 minutes, greater than 50% of which was spent during counseling and coordination of care    Saad Pickens MD

## 2022-03-09 NOTE — PROGRESS NOTES
OFFICE CONSULT  Referring physician:   Md ALEXIS Sesay [address]  [address] [facility],  [address] [place] [address]      Dear Dr Rajni Sanchez      Thank you for requesting a  consultation on your patient Ms Brenda Jacob for the following indications:  Genetic screening    History  Medications:  Zofran ODT 4 milligrams every 6 hours as needed for nausea in pregnancy, prenatal vitamins daily, Reglan 3 times a day  Allergies to medications:  None  Past medical history:  Denies significant medical history  Past surgical history:  [place] tooth removed  Past obstetrical history:  6- 8 week miscarriage spontaneously 2020  Social history:  Denies substance use  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  First generation family history:  She does not report any significant 1st generation family history on her intake form but epic suggests her dad has hypertension and pre gestational diabetes    Ultrasound findings: The ultrasound shows a fetus concordant with dates  The nasal bone and nuchal translucency appears normal  No malformations are seen on today's early ultrasound  The patient was informed of the findings and counseled about the limitations of the exam in detecting all forms of fetal congenital abnormalities  She does not report any vaginal bleeding or uterine cramping or contractions  Specific counseling was provided on the following problems:  1  We discussed the options for genetic screening which include invasive testing on the fetal placenta or on fetal skin cells within the amniotic fluid and compared this to noninvasive testing which includes cell free DNA screening and the sequential screen  We reviewed the risks, the benefits and the limitations of each  In the end patient chose to complete the cell free DNA screen    2  With her risk factors of being delivered near the age of 28, and her family history of hypertension recommend baby aspirin 162 milligrams daily which can decrease the risk of developing preeclampsia in pregnancy Offered baby aspirin   3  We discussed that Pepcid ( over the counter) can be used for reflux symptoms that might improve her symptoms of nausea in pregnancy      Future tests recommended:  1  The results of her NIPT will return in 7-10 days and her OB office will order an MSAFP screen at 16-18 weeks to screen for spina bifida  Future ultrasounds ordered today:   1  Fetal Level II ultrasound imaging is recommended at 19-20 weeks' gestation      The face to face time, in addition to time spent discussing ultrasound results, was approximately 15 minutes, greater than 50% of which was spent during counseling and coordination of care    Lindajo Alpers, MD

## 2022-03-09 NOTE — LETTER
2022     Mariano Bray, [address] [place]  [facility] [id number]    Patient: Sanju Montenegro   YOB: 1990   Date of Visit: 3/9/2022       Dear Dr Trav Castillo:    Thank you for referring Donny Huber to me for evaluation  Below are my notes for this consultation  If you have questions, please do not hesitate to call me  I look forward to following your patient along with you  Sincerely,        Kate Newman MD        CC: No Recipients  Kate Newman MD  3/11/2022  5:41 AM  Sign when Signing Visit  OFFICE CONSULT  Referring physician:   Mariano Bray Md  [facility] [address]  [facility]  [facility]      Dear Dr Trav Castillo      Thank you for requesting a  consultation on your patient Ms Sanju Montenegro for the following indications:  Genetic screening    History  Medications:  Zofran ODT 4 milligrams every 6 hours as needed for nausea in pregnancy, prenatal vitamins daily, Reglan 3 times a day  Allergies to medications:  None  Past medical history:  Denies significant medical history  Past surgical history:  [place] tooth removed  Past obstetrical history:  6- 8 week miscarriage spontaneously 2020  Social history:  Denies substance use  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  First generation family history:  She does not report any significant 1st generation family history on her intake form but epic suggests her dad has hypertension and pre gestational diabetes    Ultrasound findings: The ultrasound shows a fetus concordant with dates  The nasal bone and nuchal translucency appears normal  No malformations are seen on today's early ultrasound  The patient was informed of the findings and counseled about the limitations of the exam in detecting all forms of fetal congenital abnormalities  She does not report any vaginal bleeding or uterine cramping or contractions        Specific counseling was provided on the following problems:  1  We discussed the options for genetic screening which include invasive testing on the fetal placenta or on fetal skin cells within the amniotic fluid and compared this to noninvasive testing which includes cell free DNA screening and the sequential screen  We reviewed the risks, the benefits and the limitations of each  In the end patient chose to complete the cell free DNA screen  2  With her risk factors of being delivered near the age of 26, and her family history of hypertension recommend baby aspirin 162 milligrams daily which can decrease the risk of developing preeclampsia in pregnancy Offered baby aspirin   3  We discussed that Pepcid ( over the counter) can be used for reflux symptoms that might improve her symptoms of nausea in pregnancy      Future tests recommended:  1  The results of her NIPT will return in 7-10 days and her OB office will order an MSAFP screen at 16-18 weeks to screen for spina bifida  Future ultrasounds ordered today:   1  Fetal Level II ultrasound imaging is recommended at 19-20 weeks' gestation      The face to face time, in addition to time spent discussing ultrasound results, was approximately 15 minutes, greater than 50% of which was spent during counseling and coordination of care    Radha Seals MD

## 2022-03-09 NOTE — PROGRESS NOTES
Patient chose to use [facility] lab and was given lab slip for the Noninvasive Prenatal Screen -[facility] Qnatal Advanced  Blood sample is drawn at [facility] and online appointment scheduling and lab locations can be found @ www  [employer]     Qnatal  card given, patient instructed how to check her out- of -pocket responsibility @ [employer]  Patient aware that  is provided by third party and is only an estimate of cost ,not a guarantee  For definitive cost, patient encouraged to call her insurance provider- insurance phone # located on the back of her ID card  Some Insurance plans may require prior authorization before blood is drawn  Patient instructed to check with her plan before she goes to lab and notify [facility]  Patient made aware she may be responsible for full cost of test if lab drawn before prior authorization obtained  Insurance Authorization may take up to 14 business days, patient made aware insurance authorization does not mean test is covered 100%  Information on how to check OOP NIPT coverage/ check if a prior authorization needed for NIPT was also provided to patient during the appointment scheduling of her [facility] NT appt  Patient made aware Qnatal results typically are available in 7-10 business days after blood draw and to call [facility] if she does not receive notification of her results  Patient made aware that viewing Qnatal results online will reveal gender  Patient verbalized understanding of all instructions and no questions at this time

## 2022-03-11 ENCOUNTER — TELEPHONE (OUTPATIENT)
Dept: PERINATAL CARE | Facility: OTHER | Age: 32
End: 2022-03-11

## 2022-03-11 NOTE — TELEPHONE ENCOUNTER
Called patient and stated that we need a copy of her insurance card due to authorization  She stated that she will send to my email as soon as possible  As soon as I receive, I will scan in her chart

## 2022-03-14 ENCOUNTER — APPOINTMENT (OUTPATIENT)
Dept: LAB | Facility: CLINIC | Age: 32
End: 2022-03-14

## 2022-03-14 ENCOUNTER — APPOINTMENT (OUTPATIENT)
Dept: URGENT CARE | Facility: CLINIC | Age: 32
End: 2022-03-14

## 2022-03-14 DIAGNOSIS — Z02.1 PRE-EMPLOYMENT HEALTH SCREENING EXAMINATION: Primary | ICD-10-CM

## 2022-03-14 DIAGNOSIS — Z02.1 PRE-EMPLOYMENT HEALTH SCREENING EXAMINATION: ICD-10-CM

## 2022-03-14 LAB — RUBV IGG SERPL IA-ACNC: 54.1 IU/ML

## 2022-03-14 PROCEDURE — 86765 RUBEOLA ANTIBODY: CPT

## 2022-03-14 PROCEDURE — 86762 RUBELLA ANTIBODY: CPT

## 2022-03-14 PROCEDURE — 86480 TB TEST CELL IMMUN MEASURE: CPT

## 2022-03-14 PROCEDURE — 36415 COLL VENOUS BLD VENIPUNCTURE: CPT

## 2022-03-14 PROCEDURE — 86787 VARICELLA-ZOSTER ANTIBODY: CPT

## 2022-03-14 PROCEDURE — 86735 MUMPS ANTIBODY: CPT

## 2022-03-15 LAB
GAMMA INTERFERON BACKGROUND BLD IA-ACNC: 0.02 IU/ML
M TB IFN-G BLD-IMP: ABNORMAL
M TB IFN-G CD4+ BCKGRND COR BLD-ACNC: 0 IU/ML
M TB IFN-G CD4+ BCKGRND COR BLD-ACNC: 0 IU/ML
MEV IGG SER QL: NORMAL
MITOGEN IGNF BCKGRD COR BLD-ACNC: 0.4 IU/ML
MUV IGG SER QL: NORMAL
VZV IGG SER IA-ACNC: NORMAL

## 2022-03-16 ENCOUNTER — TELEPHONE (OUTPATIENT)
Dept: OBGYN CLINIC | Facility: CLINIC | Age: 32
End: 2022-03-16

## 2022-03-16 DIAGNOSIS — Z33.1 NORMAL PREGNANCY, INCIDENTAL: Primary | ICD-10-CM

## 2022-03-16 DIAGNOSIS — O21.9 NAUSEA AND VOMITING DURING PREGNANCY: ICD-10-CM

## 2022-03-16 RX ORDER — ONDANSETRON 4 MG/1
4 TABLET, ORALLY DISINTEGRATING ORAL EVERY 6 HOURS PRN
Qty: 20 TABLET | Refills: 2 | Status: SHIPPED | OUTPATIENT
Start: 2022-03-16 | End: 2022-04-07 | Stop reason: SDUPTHER

## 2022-03-17 ENCOUNTER — TELEPHONE (OUTPATIENT)
Dept: PERINATAL CARE | Facility: CLINIC | Age: 32
End: 2022-03-17

## 2022-03-17 ENCOUNTER — APPOINTMENT (OUTPATIENT)
Dept: LAB | Facility: CLINIC | Age: 32
End: 2022-03-17
Payer: COMMERCIAL

## 2022-03-17 DIAGNOSIS — Z33.1 NORMAL PREGNANCY, INCIDENTAL: ICD-10-CM

## 2022-03-17 PROCEDURE — 36415 COLL VENOUS BLD VENIPUNCTURE: CPT

## 2022-03-17 PROCEDURE — 83020 HEMOGLOBIN ELECTROPHORESIS: CPT

## 2022-03-17 NOTE — TELEPHONE ENCOUNTER
Left a M for pt that she doesn't need Prior Authorization for her NIPT [address], from her Jae Knight as per Danya Nguyen, [facility] Clinical Coordinator  Instructed pt to call the nursing line with any questions

## 2022-03-21 LAB
HGB A MFR BLD: 2.7 % (ref 1.8–3.2)
HGB A MFR BLD: 97.3 % (ref 96.4–98.8)
HGB F MFR BLD: 0 % (ref 0–2)
HGB FRACT BLD-IMP: NORMAL
HGB S MFR BLD: 0 %

## 2022-03-24 LAB
# FETUSES US: 1
CFDNA.FET/TOTAL PLAS.CFDNA: NORMAL
FET CHR 13 TS PLAS.CFDNA QL: NEGATIVE
FET CHR 18 TS PLAS.CFDNA QL: NEGATIVE
FET CHR 21 TS PLAS.CFDNA QL: NEGATIVE
FET CHR X + Y ANEUP PLAS.CFDNA QL: NORMAL
FET CHROM X + Y ANEUP CFDNA IMP: NORMAL
FET Y CHROM PLAS.CFDNA QL: DETECTED
FET Y CHROM PLAS.CFDNA: NORMAL
GA (DAYS): 6 D
GA (WEEKS): 14 WK
MICRODELETION SYND BLD/T FISH: NORMAL
REF LAB TEST METHOD: NORMAL
SERVICE CMNT-IMP: NORMAL
SERVICE CMNT-IMP: NORMAL
SL AMB ABNORMAL MSS?: NORMAL
SL AMB ABNORMAL US?: NORMAL
SL AMB ADVANCED MATERNAL AGE?: NORMAL
SL AMB MICRODELETION INTERP: NORMAL
SL AMB MICRODELETION: NORMAL
SL AMB PERSONAL/FAM HISTORY?: NORMAL
SL AMB SPECIFICATIONS: NORMAL

## 2022-03-27 NOTE — RESULT ENCOUNTER NOTE
Your Cell free DNA screening returned as normal   If you are interested in knowing what the baby's sex is, than you will need to open the lab result to see it  Your obstetrician at your next OB visit should offer screening for spina bifida that can be completed between 12 and 18 weeks and utilizes the blood test called MSAFP  This lab is to see if your baby is at increased risk to have spinal defect      Gabriele Calderon MD

## 2022-04-07 ENCOUNTER — ROUTINE PRENATAL (OUTPATIENT)
Dept: OBGYN CLINIC | Facility: CLINIC | Age: 32
End: 2022-04-07

## 2022-04-07 VITALS
WEIGHT: 138.4 LBS | BODY MASS INDEX: 25.47 KG/M2 | SYSTOLIC BLOOD PRESSURE: 126 MMHG | DIASTOLIC BLOOD PRESSURE: 82 MMHG | HEIGHT: 62 IN

## 2022-04-07 DIAGNOSIS — Z36.9 ENCOUNTER FOR ANTENATAL SCREENING: ICD-10-CM

## 2022-04-07 DIAGNOSIS — Z34.82 ENCOUNTER FOR SUPERVISION OF NORMAL PREGNANCY IN MULTIGRAVIDA IN SECOND TRIMESTER: Primary | ICD-10-CM

## 2022-04-07 DIAGNOSIS — O21.9 NAUSEA AND VOMITING DURING PREGNANCY: ICD-10-CM

## 2022-04-07 PROCEDURE — PNV: Performed by: PHYSICIAN ASSISTANT

## 2022-04-07 RX ORDER — ONDANSETRON 4 MG/1
4 TABLET, ORALLY DISINTEGRATING ORAL EVERY 6 HOURS PRN
Qty: 20 TABLET | Refills: 2 | Status: SHIPPED | OUTPATIENT
Start: 2022-04-07 | End: 2022-05-04 | Stop reason: SDUPTHER

## 2022-04-07 NOTE — PATIENT INSTRUCTIONS
Go for blood work  F/u with MFM as planned  Refills of Zofran sent to pharmacy  Continue PNV and aspirin  Try Pepcid or Prilosec for reflux

## 2022-04-07 NOTE — PROGRESS NOTES
Assessment     Pregnancy 17 and 6/7 weeks     Plan     OBGCT: discussed  Follow up in 4 weeks  Order for MSAFP entered  F/u with MFM as planned  Refills of Zofran sent to pharmacy  Continue PNV and aspirin  Try Pepcid or Prilosec for reflux  Subjective     Rati Marcus Sweeney is a 27 y o  female being seen today for her obstetrical visit  She is at 17w6d gestation  Patient reports heartburn, nausea, no bleeding, no contractions, no cramping, no leaking and vomiting  Fetal movement: not appreciated yet  Patient states she is doing well overall  N/v is controlled with Zofran; requests refills  Complains of reflux; Tums has not helped  NT scan showed appropriate fetal growth; has Level II at the end of the month  NIPT screen negative; needs MSAFP  Taking PNV and daily aspirin  Notes occasional HA  Denies abdominal pain and swelling  Menstrual History:  OB History        2    Para        Term                AB   1    Living           SAB   1    IAB   0    Ectopic   0    Multiple   0    Live Births   0                Menarche age: N/A  Patient's last menstrual period was 2021  The following portions of the patient's history were reviewed and updated as appropriate: allergies, current medications, past family history, past medical history, past social history, past surgical history and problem list     Review of Systems  Pertinent items are noted in HPI       Objective      /82 (BP Location: Left arm, Patient Position: Sitting, Cuff Size: Standard)   Ht 5' 2" (1 575 m)   Wt 62 8 kg (138 lb 6 4 oz)   LMP 2021   BMI 25 31 kg/m²   FHT: 151 BPM   Uterine Size: size equals dates

## 2022-04-08 ENCOUNTER — APPOINTMENT (OUTPATIENT)
Dept: LAB | Facility: CLINIC | Age: 32
End: 2022-04-08

## 2022-04-08 DIAGNOSIS — Z11.1 TUBERCULOSIS SCREENING: ICD-10-CM

## 2022-04-08 DIAGNOSIS — Z11.1 TUBERCULOSIS SCREENING: Primary | ICD-10-CM

## 2022-04-08 DIAGNOSIS — Z36.9 ENCOUNTER FOR ANTENATAL SCREENING: ICD-10-CM

## 2022-04-08 PROCEDURE — 86480 TB TEST CELL IMMUN MEASURE: CPT

## 2022-04-08 PROCEDURE — 36415 COLL VENOUS BLD VENIPUNCTURE: CPT

## 2022-04-08 PROCEDURE — 82105 ALPHA-FETOPROTEIN SERUM: CPT

## 2022-04-10 LAB
2ND TRIMESTER 4 SCREEN SERPL-IMP: NORMAL
AFP ADJ MOM SERPL: 1.06
AFP INTERP AMN-IMP: NORMAL
AFP INTERP SERPL-IMP: NORMAL
AFP INTERP SERPL-IMP: NORMAL
AFP SERPL-MCNC: 51.3 NG/ML
AGE AT DELIVERY: 34.6 YR
GA METHOD: NORMAL
GA: 18 WEEKS
IDDM PATIENT QL: NO
MULTIPLE PREGNANCY: NO
NEURAL TUBE DEFECT RISK FETUS: NORMAL %

## 2022-04-11 LAB
GAMMA INTERFERON BACKGROUND BLD IA-ACNC: 0.02 IU/ML
M TB IFN-G BLD-IMP: NEGATIVE
M TB IFN-G CD4+ BCKGRND COR BLD-ACNC: 0 IU/ML
M TB IFN-G CD4+ BCKGRND COR BLD-ACNC: 0 IU/ML
MITOGEN IGNF BCKGRD COR BLD-ACNC: 1.75 IU/ML

## 2022-04-26 ENCOUNTER — ROUTINE PRENATAL (OUTPATIENT)
Dept: PERINATAL CARE | Facility: OTHER | Age: 32
End: 2022-04-26
Payer: COMMERCIAL

## 2022-04-26 VITALS
WEIGHT: 140.8 LBS | DIASTOLIC BLOOD PRESSURE: 85 MMHG | SYSTOLIC BLOOD PRESSURE: 131 MMHG | HEIGHT: 62 IN | HEART RATE: 105 BPM | BODY MASS INDEX: 25.91 KG/M2

## 2022-04-26 DIAGNOSIS — Z36.86 ENCOUNTER FOR ANTENATAL SCREENING FOR CERVICAL LENGTH: ICD-10-CM

## 2022-04-26 DIAGNOSIS — Z36.3 ENCOUNTER FOR ANTENATAL SCREENING FOR MALFORMATIONS: Primary | ICD-10-CM

## 2022-04-26 DIAGNOSIS — Z3A.20 20 WEEKS GESTATION OF PREGNANCY: ICD-10-CM

## 2022-04-26 PROCEDURE — 76817 TRANSVAGINAL US OBSTETRIC: CPT | Performed by: OBSTETRICS & GYNECOLOGY

## 2022-04-26 PROCEDURE — 3008F BODY MASS INDEX DOCD: CPT | Performed by: OBSTETRICS & GYNECOLOGY

## 2022-04-26 PROCEDURE — 76805 OB US >/= 14 WKS SNGL FETUS: CPT | Performed by: OBSTETRICS & GYNECOLOGY

## 2022-04-26 PROCEDURE — 99213 OFFICE O/P EST LOW 20 MIN: CPT | Performed by: OBSTETRICS & GYNECOLOGY

## 2022-04-26 RX ORDER — ASPIRIN 81 MG/1
162 TABLET ORAL DAILY
COMMUNITY

## 2022-04-26 NOTE — LETTER
April 27, 2022     Marlyn Wolff, [address] [place]    Patient: Rody Urias   YOB: 1990   Date of Visit: 4/26/2022       Dear Dr Cesario Kang:    Thank you for referring Chung Perez to me for evaluation  Below are my notes for this consultation  If you have questions, please do not hesitate to call me  I look forward to following your patient along with you  Sincerely,        Joshua Mahmood MD        CC: No Recipients  Joshua Mahmood MD  4/26/2022  7:25 AM  Sign when Signing Visit  Please refer to the [facility] ultrasound report in Ob Procedures for additional information regarding today's visit

## 2022-04-26 NOTE — PROGRESS NOTES
Ultrasound Probe Disinfection    A transvaginal ultrasound was performed  Prior to use, disinfection was performed with High Level Disinfection Process (Trophon)  Probe serial number F3: [id number] was used        Faiza Giles RDMS  04/26/22  1:06 PM

## 2022-04-26 NOTE — PROGRESS NOTES
Please refer to the [facility] ultrasound report in Ob Procedures for additional information regarding today's visit

## 2022-05-03 ENCOUNTER — AMB VIDEO VISIT (OUTPATIENT)
Dept: OTHER | Facility: HOSPITAL | Age: 32
End: 2022-05-03
Payer: COMMERCIAL

## 2022-05-03 DIAGNOSIS — J06.9 ACUTE UPPER RESPIRATORY INFECTION, UNSPECIFIED: Primary | ICD-10-CM

## 2022-05-03 PROBLEM — Z86.19 H/O COLD SORES: Status: ACTIVE | Noted: 2022-05-03

## 2022-05-03 PROCEDURE — 99212 OFFICE O/P EST SF 10 MIN: CPT | Performed by: PHYSICIAN ASSISTANT

## 2022-05-03 NOTE — CARE ANYWHERE EVISITS
Visit Summary for ROCIO JOE - Gender: Female - Date of Birth: [id number]  Date: [id number] - Duration: 12 minutes  Patient: Michael JOE  Provider: Shalini Cervantes PA-C    Patient Contact Information  Address  [address] [place] [id number]      Visit Topics  Flu-Like Symptoms [Added By: Self - 2022-05-03]  Headache [Added By: Self - 2022-05-03]  Fever [Added By: Self - 2022-05-03]    Triage Questions   What is your current physical address in the event of a medical emergency? Answer []  Are you allergic to any medications? Answer []  Are you now or could you be pregnant? Answer []  Do you have any immune system compromise or chronic lung   disease? Answer []  Do you have any vulnerable family members in the home (infant, pregnant, cancer, elderly)? Answer []     Conversation Transcripts  [0A][0A] [Notification] You are connected with Shalini Cervantes PA-C, Urgent Care Specialist [0A][Notification] ROCIO JOE is located in [place]  [0A][Notification] ROCIO JOE has shared health history  Brayan Ernie  [0A]    Diagnosis  Acute upper respiratory infection, unspecified    Procedures  Value: 09921 Code: CPT-4 UNLISTED E&M SERVICE    Medications Prescribed    No prescriptions ordered    Electronically signed by: Amna French(NPI [id number])

## 2022-05-03 NOTE — PROGRESS NOTES
Video Visit - Desi Carter Cullen 27 y o  female MRN: [id number]    REQUIRED DOCUMENTATION:         1  This service was provided via [facility]  2  Provider located at [address] [place] [phone]  3  [facility] provider: Estephanie Livingston PA-C   4  Identify all parties in room with patient during [facility] visit:  No one else  5  After connecting through [facility], patient was identified by name and date of birth  Patient was then informed that this was a Telemedicine visit and that the exam was being conducted confidentially over secure lines  My office door was closed  No one else was in the room  Patient acknowledged consent and understanding of privacy and security of the Telemedicine visit  I informed the patient that I have reviewed their record in Epic and presented the opportunity for them to ask any questions regarding the visit today  The patient agreed to participate  VITALS: Heart Rate: 100 BPM, Respiratory Rate: 16 RPM, Temperature 99 3° F, Blood Pressure 129/59 mmHg, Pulse Ox Unavailable % on RA    HPI  27year old female with no PMH who is 5 months pregnant presents via video visit for cough, sore throat since Friday  Cough is occasionally productive  Body aches, fatigue  Low grade fevers  Didn't go to work today  Taking tylenol with relief of body aches  Did at home COVID tet Sunday which was negative  Physical Exam  Constitutional:       General: She is not in acute distress  Appearance: Normal appearance  She is not toxic-appearing  HENT:      Head: Normocephalic and atraumatic  Nose: Rhinorrhea present  Comments: Sniffling frequently     Mouth/Throat:      Mouth: Mucous membranes are moist    Eyes:      Conjunctiva/sclera: Conjunctivae normal    Pulmonary:      Effort: Pulmonary effort is normal  No respiratory distress  Breath sounds: No wheezing (no gross audible wheeze through computer)     Musculoskeletal:      Cervical back: Normal range of motion  Skin:     Findings: No rash (on face or neck)  Neurological:      Mental Status: She is alert  Cranial Nerves: No dysarthria or facial asymmetry  Psychiatric:         Mood and Affect: Mood normal          Behavior: Behavior normal          Diagnoses and all orders for this visit:    Acute upper respiratory infection, unspecified      Patient Instructions   You can try claritin and flonase to help with your symptoms and a cool mist humifider at night time  As discussed, sinus infections are typically viral and will get better on their own in 7-10 days  You should try symptomatic relief in the meantime   You can also try sinus rinses with a neti pot or nasal lavage (be sure to use distilled water ) If your symptoms do not improve after 7-10 days, you can take antibiotics because it is more likely to be bacterial at that point  Follow-up with your primary care physician for recheck in 2-3 business days  Go to the ER for sudden severe headache, high fevers, change in vision, seizure activity or anything else that is concerning

## 2022-05-03 NOTE — PATIENT INSTRUCTIONS
You can try claritin and flonase to help with your symptoms and a cool mist humifider at night time  As discussed, sinus infections are typically viral and will get better on their own in 7-10 days  You should try symptomatic relief in the meantime   You can also try sinus rinses with a neti pot or nasal lavage (be sure to use distilled water ) If your symptoms do not improve after 7-10 days, you can take antibiotics because it is more likely to be bacterial at that point  Follow-up with your primary care physician for recheck in 2-3 business days  Go to the ER for sudden severe headache, high fevers, change in vision, seizure activity or anything else that is concerning

## 2022-05-04 ENCOUNTER — ROUTINE PRENATAL (OUTPATIENT)
Dept: OBGYN CLINIC | Facility: CLINIC | Age: 32
End: 2022-05-04

## 2022-05-04 VITALS
SYSTOLIC BLOOD PRESSURE: 122 MMHG | DIASTOLIC BLOOD PRESSURE: 70 MMHG | HEIGHT: 62 IN | WEIGHT: 141 LBS | BODY MASS INDEX: 25.95 KG/M2

## 2022-05-04 DIAGNOSIS — Z3A.21 21 WEEKS GESTATION OF PREGNANCY: Primary | ICD-10-CM

## 2022-05-04 DIAGNOSIS — O21.9 NAUSEA AND VOMITING DURING PREGNANCY: ICD-10-CM

## 2022-05-04 PROCEDURE — PNV: Performed by: OBSTETRICS & GYNECOLOGY

## 2022-05-04 RX ORDER — ONDANSETRON 4 MG/1
4 TABLET, ORALLY DISINTEGRATING ORAL EVERY 6 HOURS PRN
Qty: 20 TABLET | Refills: 2 | Status: SHIPPED | OUTPATIENT
Start: 2022-05-04 | End: 2022-06-16 | Stop reason: SDUPTHER

## 2022-05-05 NOTE — PROGRESS NOTES
Patient seen evaluated denies any vaginal bleeding ruptures of membrane or contraction, having good fetal movement, denies any headache blurry vision or epigastric pain, patient is taking Zofran occasionally for episode of nausea which is improving since the beginning of the pregnancy, patient feel congested safety of over-the-counter medication discussed with patient patient has anatomical survey already done at 20 weeks results review and discussed with patient, up-to-date with her lab, to continue prenatal vitamin daily continue baby aspirin till 36 weeks Zofran when needed and to return to office in 3-4 weeks

## 2022-05-25 ENCOUNTER — ROUTINE PRENATAL (OUTPATIENT)
Dept: OBGYN CLINIC | Facility: CLINIC | Age: 32
End: 2022-05-25

## 2022-05-25 VITALS
SYSTOLIC BLOOD PRESSURE: 124 MMHG | BODY MASS INDEX: 27.2 KG/M2 | WEIGHT: 147.8 LBS | HEIGHT: 62 IN | DIASTOLIC BLOOD PRESSURE: 74 MMHG

## 2022-05-25 DIAGNOSIS — R82.90 ABNORMAL RESULT ON SCREENING URINE TEST: ICD-10-CM

## 2022-05-25 DIAGNOSIS — Z76.81 EXPECTANT PARENT PREBIRTH PEDIATRICIAN VISIT: ICD-10-CM

## 2022-05-25 DIAGNOSIS — Z34.82 ENCOUNTER FOR SUPERVISION OF OTHER NORMAL PREGNANCY, SECOND TRIMESTER: Primary | ICD-10-CM

## 2022-05-25 DIAGNOSIS — Z36.9 ANTENATAL SCREENING ENCOUNTER: ICD-10-CM

## 2022-05-25 LAB
SL AMB  POCT GLUCOSE, UA: ABNORMAL
SL AMB LEUKOCYTE ESTERASE,UA: ABNORMAL
SL AMB POCT BILIRUBIN,UA: ABNORMAL
SL AMB POCT BLOOD,UA: ABNORMAL
SL AMB POCT CLARITY,UA: CLEAR
SL AMB POCT COLOR,UA: YELLOW
SL AMB POCT KETONES,UA: ABNORMAL
SL AMB POCT NITRITE,UA: ABNORMAL
SL AMB POCT PH,UA: 6
SL AMB POCT SPECIFIC GRAVITY,UA: 1.02
SL AMB POCT URINE PROTEIN: ABNORMAL
SL AMB POCT UROBILINOGEN: ABNORMAL

## 2022-05-25 PROCEDURE — PNV: Performed by: PHYSICIAN ASSISTANT

## 2022-05-25 PROCEDURE — 87086 URINE CULTURE/COLONY COUNT: CPT | Performed by: PHYSICIAN ASSISTANT

## 2022-05-25 NOTE — PROGRESS NOTES
Assessment     Pregnancy 24 and 5/7 weeks     Plan     Routine OB visit, doing very well overall  Has scheduled 32wk f/u with MFM scheduled 7/15  Urine with some slight abnormals, no urinary sxs - will send for culture  Continue B6 and zofran prn for nausea  Continue PNV, ASA, stop aspirin 36wks  28 week lab orders placed (RPR, CBC, 1hr gtt)  Hydration   labor precautions, sxs of preeclampsia all addressed  Pt verbalized having some worry and anxiety about unplanned vaginal delivery, especially after own experiences in OB/L&D with other patients  She would like to consider planned Csection  Some reassurance given as well as support regarding her emotions  Best to talk it through with Dr Shahram Melchor at next [facility] visit scheduled , agreeable to this  Pt has good support at home  Follow up in 3-4 weeks  Chief Complaint   Patient presents with    Routine Prenatal Visit     Nausea , +  feral movement        Subjective     Rati Suzanne Rico is a 27 y o  female being seen today for her obstetrical visit  She is at 24w5d gestation  She is doing very well overall today, though expressing some general worry and anxiety about having an unplanned delivery  She notes trying to stop zofran but nausea came back so restarted and taking B6 as well  Patient reports no bleeding, no contractions, no cramping and no leaking  She denies any HA's, blurred vision or epigastric pain  Fetal movement: normal  Taking ASA and PNV  Menstrual History:  OB History        2    Para        Term                AB   1    Living           SAB   1    IAB   0    Ectopic   0    Multiple   0    Live Births   0                      2Patient's last menstrual period was 2021  The following portions of the patient's history were reviewed and updated as appropriate: allergies, current medications, past medical history, past social history and problem list     Review of Systems  Pertinent items are noted in HPI  Objective      /74 (BP Location: Left arm, Patient Position: Sitting, Cuff Size: Standard)   Ht 5' 2" (1 575 m)   Wt 67 kg (147 lb 12 8 oz)   LMP 12/03/2021   BMI 27 03 kg/m²   FHT: 144 BPM   Uterine Size: size equals dates

## 2022-05-28 LAB — BACTERIA UR CULT: NORMAL

## 2022-06-16 DIAGNOSIS — O21.9 NAUSEA AND VOMITING DURING PREGNANCY: ICD-10-CM

## 2022-06-16 RX ORDER — ONDANSETRON 4 MG/1
4 TABLET, ORALLY DISINTEGRATING ORAL EVERY 6 HOURS PRN
Qty: 20 TABLET | Refills: 2 | Status: SHIPPED | OUTPATIENT
Start: 2022-06-16 | End: 2022-07-20 | Stop reason: SDUPTHER

## 2022-06-18 ENCOUNTER — APPOINTMENT (OUTPATIENT)
Dept: LAB | Facility: CLINIC | Age: 32
End: 2022-06-18
Payer: COMMERCIAL

## 2022-06-18 DIAGNOSIS — Z36.9 ANTENATAL SCREENING ENCOUNTER: ICD-10-CM

## 2022-06-18 LAB
BASOPHILS # BLD AUTO: 0.03 THOUSANDS/ΜL (ref 0–0.1)
BASOPHILS NFR BLD AUTO: 0 % (ref 0–1)
EOSINOPHIL # BLD AUTO: 0.06 THOUSAND/ΜL (ref 0–0.61)
EOSINOPHIL NFR BLD AUTO: 1 % (ref 0–6)
ERYTHROCYTE [DISTWIDTH] IN BLOOD BY AUTOMATED COUNT: 14.2 % (ref 11.6–15.1)
GLUCOSE 1H P 50 G GLC PO SERPL-MCNC: 148 MG/DL (ref 40–134)
HCT VFR BLD AUTO: 34.6 % (ref 34.8–46.1)
HGB BLD-MCNC: 11.2 G/DL (ref 11.5–15.4)
IMM GRANULOCYTES # BLD AUTO: 0.1 THOUSAND/UL (ref 0–0.2)
IMM GRANULOCYTES NFR BLD AUTO: 1 % (ref 0–2)
LYMPHOCYTES # BLD AUTO: 1.44 THOUSANDS/ΜL (ref 0.6–4.47)
LYMPHOCYTES NFR BLD AUTO: 12 % (ref 14–44)
MCH RBC QN AUTO: 30.1 PG (ref 26.8–34.3)
MCHC RBC AUTO-ENTMCNC: 32.4 G/DL (ref 31.4–37.4)
MCV RBC AUTO: 93 FL (ref 82–98)
MONOCYTES # BLD AUTO: 0.41 THOUSAND/ΜL (ref 0.17–1.22)
MONOCYTES NFR BLD AUTO: 4 % (ref 4–12)
NEUTROPHILS # BLD AUTO: 9.8 THOUSANDS/ΜL (ref 1.85–7.62)
NEUTS SEG NFR BLD AUTO: 82 % (ref 43–75)
NRBC BLD AUTO-RTO: 0 /100 WBCS
PLATELET # BLD AUTO: 290 THOUSANDS/UL (ref 149–390)
PMV BLD AUTO: 9.8 FL (ref 8.9–12.7)
RBC # BLD AUTO: 3.72 MILLION/UL (ref 3.81–5.12)
WBC # BLD AUTO: 11.84 THOUSAND/UL (ref 4.31–10.16)

## 2022-06-18 PROCEDURE — 85025 COMPLETE CBC W/AUTO DIFF WBC: CPT

## 2022-06-18 PROCEDURE — 82950 GLUCOSE TEST: CPT

## 2022-06-18 PROCEDURE — 86592 SYPHILIS TEST NON-TREP QUAL: CPT

## 2022-06-18 PROCEDURE — 36415 COLL VENOUS BLD VENIPUNCTURE: CPT

## 2022-06-20 LAB — RPR SER QL: NORMAL

## 2022-06-21 DIAGNOSIS — R73.09 ABNORMAL GLUCOSE TOLERANCE TEST (GTT): Primary | ICD-10-CM

## 2022-06-21 DIAGNOSIS — Z3A.28 28 WEEKS GESTATION OF PREGNANCY: ICD-10-CM

## 2022-06-22 ENCOUNTER — ROUTINE PRENATAL (OUTPATIENT)
Dept: OBGYN CLINIC | Facility: CLINIC | Age: 32
End: 2022-06-22

## 2022-06-22 ENCOUNTER — TELEPHONE (OUTPATIENT)
Dept: PEDIATRICS CLINIC | Facility: CLINIC | Age: 32
End: 2022-06-22

## 2022-06-22 VITALS — BODY MASS INDEX: 26.89 KG/M2 | DIASTOLIC BLOOD PRESSURE: 70 MMHG | SYSTOLIC BLOOD PRESSURE: 102 MMHG | WEIGHT: 147 LBS

## 2022-06-22 DIAGNOSIS — Z3A.28 28 WEEKS GESTATION OF PREGNANCY: Primary | ICD-10-CM

## 2022-06-22 DIAGNOSIS — R73.09 ABNORMAL GLUCOSE: Primary | ICD-10-CM

## 2022-06-22 LAB
SL AMB  POCT GLUCOSE, UA: NEGATIVE
SL AMB LEUKOCYTE ESTERASE,UA: NEGATIVE
SL AMB POCT NITRITE,UA: NEGATIVE
SL AMB POCT URINE PROTEIN: NEGATIVE

## 2022-06-22 PROCEDURE — PNV: Performed by: OBSTETRICS & GYNECOLOGY

## 2022-06-23 NOTE — PROGRESS NOTES
Patient seen evaluated denies any vaginal bleeding ruptures of membrane or contraction, having good fetal movement, denies any headache blurry vision or epigastric pain, patient has recurrent episode of cold sores if has more episode consider suppressive therapy, abnormal 1 hour Glucola results discussed with patient, recommend 3 hour GTT patient is currently taking baby aspirin to continue till 36 weeks,  precaution given fetal kick count discuss sign and symptom of preeclampsia review to return to office in 2 weeks

## 2022-07-02 ENCOUNTER — APPOINTMENT (OUTPATIENT)
Dept: LAB | Facility: CLINIC | Age: 32
End: 2022-07-02
Payer: COMMERCIAL

## 2022-07-02 DIAGNOSIS — R73.09 ABNORMAL GLUCOSE: ICD-10-CM

## 2022-07-02 LAB
GLUCOSE 1H P 100 G GLC PO SERPL-MCNC: 174 MG/DL (ref 70–183)
GLUCOSE 2H P 100 G GLC PO SERPL-MCNC: 142 MG/DL (ref 70–155)
GLUCOSE 3H P 100 G GLC PO SERPL-MCNC: 77 MG/DL (ref 70–140)
GLUCOSE P FAST SERPL-MCNC: 81 MG/DL (ref 70–105)

## 2022-07-02 PROCEDURE — 82952 GTT-ADDED SAMPLES: CPT

## 2022-07-02 PROCEDURE — 36415 COLL VENOUS BLD VENIPUNCTURE: CPT

## 2022-07-02 PROCEDURE — 82951 GLUCOSE TOLERANCE TEST (GTT): CPT

## 2022-07-13 ENCOUNTER — ROUTINE PRENATAL (OUTPATIENT)
Dept: OBGYN CLINIC | Facility: CLINIC | Age: 32
End: 2022-07-13
Payer: COMMERCIAL

## 2022-07-13 VITALS
SYSTOLIC BLOOD PRESSURE: 124 MMHG | DIASTOLIC BLOOD PRESSURE: 72 MMHG | BODY MASS INDEX: 29.04 KG/M2 | HEIGHT: 62 IN | WEIGHT: 157.8 LBS

## 2022-07-13 DIAGNOSIS — Z23 NEED FOR DIPHTHERIA-TETANUS-PERTUSSIS (TDAP) VACCINE: ICD-10-CM

## 2022-07-13 DIAGNOSIS — Z34.93 THIRD TRIMESTER PREGNANCY: Primary | ICD-10-CM

## 2022-07-13 LAB
SL AMB  POCT GLUCOSE, UA: ABNORMAL
SL AMB LEUKOCYTE ESTERASE,UA: ABNORMAL
SL AMB POCT BILIRUBIN,UA: ABNORMAL
SL AMB POCT BLOOD,UA: ABNORMAL
SL AMB POCT CLARITY,UA: CLEAR
SL AMB POCT COLOR,UA: YELLOW
SL AMB POCT KETONES,UA: ABNORMAL
SL AMB POCT NITRITE,UA: ABNORMAL
SL AMB POCT PH,UA: 6
SL AMB POCT SPECIFIC GRAVITY,UA: 1.01
SL AMB POCT URINE PROTEIN: ABNORMAL
SL AMB POCT UROBILINOGEN: ABNORMAL

## 2022-07-13 PROCEDURE — 90715 TDAP VACCINE 7 YRS/> IM: CPT

## 2022-07-13 PROCEDURE — PNV: Performed by: PHYSICIAN ASSISTANT

## 2022-07-13 PROCEDURE — 90471 IMMUNIZATION ADMIN: CPT

## 2022-07-13 NOTE — PROGRESS NOTES
Assessment     Pregnancy 31 and 6/7 weeks     Plan     Routine OB visit, doing well  28-week labs reviewed, 3hr gtt WNL  MFM in 2 days for f/u anatomy  Continue ASA/PNBV  Stay well-hydrated and well balanced diet  Zofran prn nausea, B6  Stressed kick counts  Tdap admin today  B pos blood type  Discussed  labor precautions and signs/sxs preeclampsia  Follow up in 2 Weeks  Chief Complaint   Patient presents with    Routine Prenatal Visit       Amy Forman is a 27 y o  female being seen today for her obstetrical visit  She is at 31w5d gestation  She is doing very well overall and has no concerns today  Patient reports no bleeding, no contractions, no cramping and no leaking  Denies HA's, epigastric pain, blurred vision  Normal BM and urination  Fetal movement: normal   Taking PNV and ASA  Has 32wk anatomy scan  with MFM  Menstrual History:  OB History        2    Para        Term                AB   1    Living           SAB   1    IAB   0    Ectopic   0    Multiple   0    Live Births   0                Menarche age: N/A  Patient's last menstrual period was 2021  The following portions of the patient's history were reviewed and updated as appropriate: allergies, current medications, past medical history, past social history and problem list     Review of Systems  Pertinent items are noted in HPI       Objective     /72 (BP Location: Left arm, Patient Position: Sitting, Cuff Size: Large)   Ht 5' 2" (1 575 m)   Wt 71 6 kg (157 lb 12 8 oz)   LMP 2021   BMI 28 86 kg/m²   FHT:  152 BPM   Uterine Size: size equals dates   Presentation: unsure

## 2022-07-15 ENCOUNTER — ULTRASOUND (OUTPATIENT)
Dept: PERINATAL CARE | Facility: OTHER | Age: 32
End: 2022-07-15
Payer: COMMERCIAL

## 2022-07-15 VITALS
DIASTOLIC BLOOD PRESSURE: 88 MMHG | HEART RATE: 93 BPM | HEIGHT: 62 IN | WEIGHT: 157.19 LBS | SYSTOLIC BLOOD PRESSURE: 146 MMHG | BODY MASS INDEX: 28.93 KG/M2

## 2022-07-15 DIAGNOSIS — O99.810 ABNORMAL GLUCOSE TOLERANCE IN PREGNANCY: ICD-10-CM

## 2022-07-15 DIAGNOSIS — Z36.89 ENCOUNTER FOR ULTRASOUND TO ASSESS FETAL GROWTH: Primary | ICD-10-CM

## 2022-07-15 DIAGNOSIS — Z3A.32 32 WEEKS GESTATION OF PREGNANCY: ICD-10-CM

## 2022-07-15 PROBLEM — R03.0 ELEVATED BLOOD PRESSURE READING IN OFFICE WITHOUT DIAGNOSIS OF HYPERTENSION: Status: ACTIVE | Noted: 2022-07-15

## 2022-07-15 PROCEDURE — 76816 OB US FOLLOW-UP PER FETUS: CPT | Performed by: OBSTETRICS & GYNECOLOGY

## 2022-07-15 PROCEDURE — 99213 OFFICE O/P EST LOW 20 MIN: CPT | Performed by: OBSTETRICS & GYNECOLOGY

## 2022-07-15 RX ORDER — PYRIDOXINE HCL (VITAMIN B6) 100 MG
TABLET ORAL DAILY
COMMUNITY
End: 2022-09-04

## 2022-07-15 NOTE — PROGRESS NOTES
[address] [place] [address] Aghlabité: Ms Ramon Ding was seen today at 32w0d for fetal growth assessment ultrasound  See ultrasound report under "OB Procedures" tab    Please don't hesitate to contact our office with any concerns or questions   -Taylor Nova MD

## 2022-07-15 NOTE — PATIENT INSTRUCTIONS
Thank you for choosing us for your  care today  If you have any questions about your ultrasound or care, please do not hesitate to contact us or your primary obstetrician  Some general instructions for your pregnancy are:    Protect against coronavirus: get vaccinated - pregnant women are increased risk of severe COVID  Notify your primary care doctor if you have any symptoms  Exercise: Aim for 22 minutes per day (150 minutes per week) of regular exercise  Walking is great! Nutrition: aim for calcium-rich and iron-rich foods as well as healthy sources of protein  Learn about Preeclampsia: preeclampsia is a common, serious high blood pressure complication in pregnancy  A blood pressure of 874ZHBR (systolic or top number) or 40KKOM (diastolic or bottom number) is not normal and needs evaluation by your doctor  Aspirin is sometimes prescribed in early pregnancy to prevent preeclampsia in women with risk factors - ask your obstetrician if you should be on this medication  If you smoke, try to reduce how many cigarettes you smoke or try to quit completely  Do not vape  Other warning signs to watch out for in pregnancy or postpartum: chest pain, obstructed breathing or shortness of breath, seizures, thoughts of hurting yourself or your baby, bleeding, a painful or swollen leg, fever, or headache (see [facility] POST-BIRTH Warning Signs campaign)  If these happen call 911  Itching is also not normal in pregnancy and if you experience this, especially over your hands and feet, potentially worse at night, notify your doctors

## 2022-07-20 DIAGNOSIS — O21.9 NAUSEA AND VOMITING DURING PREGNANCY: ICD-10-CM

## 2022-07-20 NOTE — TELEPHONE ENCOUNTER
Patient called and said that she needs a refill on zofran  Patient can be reached at phone number in chart for any questions

## 2022-07-21 RX ORDER — ONDANSETRON 4 MG/1
4 TABLET, ORALLY DISINTEGRATING ORAL EVERY 6 HOURS PRN
Qty: 20 TABLET | Refills: 2 | Status: SHIPPED | OUTPATIENT
Start: 2022-07-21 | End: 2022-08-22 | Stop reason: SDUPTHER

## 2022-07-27 ENCOUNTER — ROUTINE PRENATAL (OUTPATIENT)
Dept: OBGYN CLINIC | Facility: CLINIC | Age: 32
End: 2022-07-27
Payer: COMMERCIAL

## 2022-07-27 VITALS
HEIGHT: 62 IN | SYSTOLIC BLOOD PRESSURE: 122 MMHG | DIASTOLIC BLOOD PRESSURE: 72 MMHG | WEIGHT: 159.8 LBS | BODY MASS INDEX: 29.4 KG/M2

## 2022-07-27 DIAGNOSIS — Z34.93 THIRD TRIMESTER PREGNANCY: Primary | ICD-10-CM

## 2022-07-27 LAB
SL AMB  POCT GLUCOSE, UA: ABNORMAL
SL AMB LEUKOCYTE ESTERASE,UA: ABNORMAL
SL AMB POCT BILIRUBIN,UA: ABNORMAL
SL AMB POCT BLOOD,UA: ABNORMAL
SL AMB POCT CLARITY,UA: CLEAR
SL AMB POCT COLOR,UA: YELLOW
SL AMB POCT KETONES,UA: ABNORMAL
SL AMB POCT NITRITE,UA: ABNORMAL
SL AMB POCT PH,UA: 6.5
SL AMB POCT SPECIFIC GRAVITY,UA: 1.01
SL AMB POCT URINE PROTEIN: ABNORMAL
SL AMB POCT UROBILINOGEN: ABNORMAL

## 2022-07-27 PROCEDURE — 81002 URINALYSIS NONAUTO W/O SCOPE: CPT | Performed by: OBSTETRICS & GYNECOLOGY

## 2022-07-27 PROCEDURE — PNV: Performed by: OBSTETRICS & GYNECOLOGY

## 2022-07-28 NOTE — PROGRESS NOTES
Patient seen evaluated denies any complain denies any vaginal bleeding ruptures of membrane or contraction, denies any headache blurry vision or epigastric pain   Continue taking prenatal vitamin daily to take baby aspirin till 36 weeks   Delivery method discussed with patient at the visit today patient requesting primary  section ACOG recommendation regarding maternal request for primary  section discussed with patient   Comparison between vaginal delivery and  risk benefit recovery time all explained and discussed with patient in details patient aware and desire to proceed with primary  will consider proceeding with primary  at 44 weeks based on maternal request again risk benefit  explained and discussed with patient all questions answered and patient was satisfied

## 2022-08-03 ENCOUNTER — ROUTINE PRENATAL (OUTPATIENT)
Dept: OBGYN CLINIC | Facility: CLINIC | Age: 32
End: 2022-08-03

## 2022-08-03 VITALS
DIASTOLIC BLOOD PRESSURE: 82 MMHG | HEIGHT: 62 IN | SYSTOLIC BLOOD PRESSURE: 122 MMHG | BODY MASS INDEX: 29.63 KG/M2 | WEIGHT: 161 LBS

## 2022-08-03 DIAGNOSIS — B00.9 HERPES: ICD-10-CM

## 2022-08-03 DIAGNOSIS — Z34.93 THIRD TRIMESTER PREGNANCY: Primary | ICD-10-CM

## 2022-08-03 LAB
SL AMB  POCT GLUCOSE, UA: NORMAL
SL AMB LEUKOCYTE ESTERASE,UA: NORMAL
SL AMB POCT BILIRUBIN,UA: NORMAL
SL AMB POCT BLOOD,UA: NORMAL
SL AMB POCT CLARITY,UA: CLEAR
SL AMB POCT COLOR,UA: YELLOW
SL AMB POCT KETONES,UA: NORMAL
SL AMB POCT NITRITE,UA: NORMAL
SL AMB POCT PH,UA: 6.5
SL AMB POCT SPECIFIC GRAVITY,UA: 1.01
SL AMB POCT URINE PROTEIN: NORMAL
SL AMB POCT UROBILINOGEN: NORMAL

## 2022-08-03 PROCEDURE — PNV: Performed by: OBSTETRICS & GYNECOLOGY

## 2022-08-03 RX ORDER — VALACYCLOVIR HYDROCHLORIDE 500 MG/1
500 TABLET, FILM COATED ORAL DAILY
Qty: 30 TABLET | Refills: 0 | Status: SHIPPED | OUTPATIENT
Start: 2022-08-03 | End: 2022-09-02

## 2022-08-04 NOTE — PROGRESS NOTES
Patient seen evaluated denies any vaginal bleeding ruptures of membrane or contraction, having good fetal movement, denies any headache blurry vision or epigastric pain   Taking prenatal vitamin daily as well as baby aspirin secondary to recurrent cold sores desire to start Valtrex daily for suppression  Patient still desired to proceed with primary  date scheduled   To stop baby aspirin at 36 weeks continue prenatal vitamin daily return to office in 2 weeks next visit GBS  precaution given fetal kick count discuss sign and symptom of preeclampsia review

## 2022-08-17 ENCOUNTER — ROUTINE PRENATAL (OUTPATIENT)
Dept: OBGYN CLINIC | Facility: CLINIC | Age: 32
End: 2022-08-17

## 2022-08-17 VITALS
SYSTOLIC BLOOD PRESSURE: 124 MMHG | HEIGHT: 62 IN | BODY MASS INDEX: 30.47 KG/M2 | WEIGHT: 165.6 LBS | DIASTOLIC BLOOD PRESSURE: 68 MMHG

## 2022-08-17 DIAGNOSIS — Z3A.36 36 WEEKS GESTATION OF PREGNANCY: Primary | ICD-10-CM

## 2022-08-17 PROCEDURE — PNV: Performed by: OBSTETRICS & GYNECOLOGY

## 2022-08-17 PROCEDURE — 87150 DNA/RNA AMPLIFIED PROBE: CPT | Performed by: OBSTETRICS & GYNECOLOGY

## 2022-08-18 ENCOUNTER — TELEPHONE (OUTPATIENT)
Dept: PEDIATRICS CLINIC | Facility: CLINIC | Age: 32
End: 2022-08-18

## 2022-08-18 NOTE — TELEPHONE ENCOUNTER
Called to set up pediatric pcp for when baby is born  Asked patient to give us as call back as soon as possible

## 2022-08-18 NOTE — PROGRESS NOTES
Patient seen evaluated denies any vaginal bleeding ruptures of membrane or contraction, having good fetal movement, denies any headache blurry vision or epigastric pain still desired primary   GBS done today, labor instruction given, fetal kick count discuss, sign and symptom of preeclampsia review, to return to office in 1 week

## 2022-08-19 LAB — GP B STREP DNA SPEC QL NAA+PROBE: NEGATIVE

## 2022-08-22 ENCOUNTER — TELEPHONE (OUTPATIENT)
Dept: OBGYN CLINIC | Facility: CLINIC | Age: 32
End: 2022-08-22

## 2022-08-22 DIAGNOSIS — O21.9 NAUSEA AND VOMITING DURING PREGNANCY: ICD-10-CM

## 2022-08-22 RX ORDER — ONDANSETRON 4 MG/1
4 TABLET, ORALLY DISINTEGRATING ORAL EVERY 6 HOURS PRN
Qty: 20 TABLET | Refills: 2 | Status: SHIPPED | OUTPATIENT
Start: 2022-08-22 | End: 2022-09-04

## 2022-08-22 NOTE — TELEPHONE ENCOUNTER
Patient called she needs a refill on   ondansetron (Zofran ODT) 4 mg disintegrating tablet [[id number]]     Order Details  Dose: 4 mg Route: Oral Frequency: Every 6 hours PRN for nausea, vomiting   Dispense Quantity: 20 tablet Refills: 2          Sig: Take 1 tablet (4 mg total) by mouth every 6 (six) hours as needed for nausea or vomiting

## 2022-08-24 ENCOUNTER — ROUTINE PRENATAL (OUTPATIENT)
Dept: OBGYN CLINIC | Facility: CLINIC | Age: 32
End: 2022-08-24

## 2022-08-24 VITALS
SYSTOLIC BLOOD PRESSURE: 118 MMHG | WEIGHT: 165 LBS | DIASTOLIC BLOOD PRESSURE: 60 MMHG | HEIGHT: 62 IN | BODY MASS INDEX: 30.36 KG/M2

## 2022-08-24 DIAGNOSIS — Z3A.37 37 WEEKS GESTATION OF PREGNANCY: Primary | ICD-10-CM

## 2022-08-24 LAB
SL AMB  POCT GLUCOSE, UA: NORMAL
SL AMB LEUKOCYTE ESTERASE,UA: NORMAL
SL AMB POCT NITRITE,UA: NORMAL
SL AMB POCT URINE PROTEIN: NORMAL

## 2022-08-24 PROCEDURE — PNV: Performed by: OBSTETRICS & GYNECOLOGY

## 2022-08-24 NOTE — PROGRESS NOTES
Patient seen evaluated denies any vaginal bleeding ruptures of membrane or contraction, having good fetal movement, denies any headache blurry vision or epigastric pain taking prenatal vitamin daily, still desired to proceed with primary   Labor instruction given, fetal kick count discuss, sign and symptom of preeclampsia review to return to office in 1 week

## 2022-08-25 ENCOUNTER — APPOINTMENT (OUTPATIENT)
Dept: LAB | Facility: CLINIC | Age: 32
End: 2022-08-25

## 2022-08-25 DIAGNOSIS — Z00.8 HEALTH EXAMINATION IN POPULATION SURVEY: ICD-10-CM

## 2022-08-25 LAB
CHOLEST SERPL-MCNC: 236 MG/DL
EST. AVERAGE GLUCOSE BLD GHB EST-MCNC: 120 MG/DL
HBA1C MFR BLD: 5.8 %
HDLC SERPL-MCNC: 105 MG/DL
LDLC SERPL CALC-MCNC: 100 MG/DL (ref 0–100)
NONHDLC SERPL-MCNC: 131 MG/DL
TRIGL SERPL-MCNC: 154 MG/DL

## 2022-08-25 PROCEDURE — 36415 COLL VENOUS BLD VENIPUNCTURE: CPT

## 2022-08-25 PROCEDURE — 83036 HEMOGLOBIN GLYCOSYLATED A1C: CPT

## 2022-08-25 PROCEDURE — 80061 LIPID PANEL: CPT

## 2022-08-31 ENCOUNTER — ROUTINE PRENATAL (OUTPATIENT)
Dept: OBGYN CLINIC | Facility: CLINIC | Age: 32
End: 2022-08-31

## 2022-08-31 VITALS
BODY MASS INDEX: 30.73 KG/M2 | WEIGHT: 167 LBS | SYSTOLIC BLOOD PRESSURE: 132 MMHG | DIASTOLIC BLOOD PRESSURE: 80 MMHG | HEIGHT: 62 IN

## 2022-08-31 DIAGNOSIS — Z3A.38 38 WEEKS GESTATION OF PREGNANCY: Primary | ICD-10-CM

## 2022-08-31 PROCEDURE — PNV: Performed by: OBSTETRICS & GYNECOLOGY

## 2022-09-01 ENCOUNTER — ANESTHESIA EVENT (INPATIENT)
Dept: LABOR AND DELIVERY | Facility: HOSPITAL | Age: 32
End: 2022-09-01
Payer: COMMERCIAL

## 2022-09-01 PROBLEM — Z3A.39 39 WEEKS GESTATION OF PREGNANCY: Status: ACTIVE | Noted: 2022-07-15

## 2022-09-01 PROCEDURE — NC001 PR NO CHARGE: Performed by: OBSTETRICS & GYNECOLOGY

## 2022-09-01 NOTE — PRE-PROCEDURE INSTRUCTIONS
Phone call to patient for pre-operative instructions prior to   The following was left on a voice message as well as the number to call with any questions ([phone] and ask for the labor and delivery charge nurse )    Pt was instructed to arrive @ 0600, 2 hours before her scheduled 0800 OR time  (If the patient is RH negative, she should be told to come in 2 1/2 hours before scheduled OR)    Pt instructed to remain NPO after midnight  *This includes gum, water and hard candy  Pt was told to brush her teeth in the morning as usual   *If patient takes AM meds they may take these meds with a sip of water  Pt was told she may take her valtrex and zofran ODT PRN    *This should not include medications like prenatal vitamins Aspirin or colace  Pt was instructed to buy and use a pre-surgical wash containing chlorhexidine the night before and the morning of her scheduled  and to wear clean clothing after the wash  Pt may shave the night before if she wishes  Pt was asked not to wear any jewelry and to leave all of her valuables at home  Pt was asked to leave all of her larger bags and suitcases in the car to be brought in after she is assigned a post partum room  Pt was informed that she may have 1 support person in the OR/PACU area of of the current visiting policies

## 2022-09-01 NOTE — PROGRESS NOTES
Patient seen evaluated denies any vaginal bleeding ruptures of membrane or contraction, having good fetal movement, denies any headache blurry vision or epigastric pain, still desired to proceed with primary  procedure explained and discussed with patient preop instruction given all patient questions answered and patient was satisfied   Labor instruction given as well as fetal kick count discuss sign and symptom of preeclampsia review

## 2022-09-01 NOTE — H&P
[facility] 27 [facility]  [facility] MRN: [id number]  Unit/Bed#:  Encounter: [id number]    Assessment: 27 y o   at 38w6d admitted for elective       EFW by US at : 42% ;   GBS status: negative   Postpartum contraception plan:     Plan:   * 39 weeks gestation of pregnancy  Assessment & Plan  Admit  CBC, RPR, Type & Screen  Anesthesia Consultation   NPO   SCDs for DVT ppx   Ballesteros catheter placement   IVF Bolus LR, followed by 125cc/hr   Continuous fetal monitoring and tocometry   Antibiotics  Ancef 1g       H/O cold sores  Assessment & Plan  Hx of HSV ( Cold Sores) : Home dose Valacyclovir ppx        Discussed case and plan w/ Dr Marquez Newman     Chief Complaint: none    HPI: Chuck Lozano is a 27 y o   with an VICKIE of 2022, by Last Menstrual Period at 38w6d who is being admitted for elective    She denies contractions, has good fetal movement and reports no vaginal bleeding of leakage of fluid    Patient Active Problem List   Diagnosis    H/O cold sores    Elevated blood pressure reading in office without diagnosis of hypertension    39 weeks gestation of pregnancy    Abnormal glucose tolerance in pregnancy       Baby complications/comments: none    Review of Systems   Constitutional: Negative for fever  Eyes: Negative for photophobia and visual disturbance  Respiratory: Negative for shortness of breath  Cardiovascular: Negative for chest pain  Gastrointestinal: Negative for abdominal pain, nausea and vomiting  Genitourinary: Negative for dysuria  Neurological: Negative for headaches  Psychiatric/Behavioral: Negative for behavioral problems         OB Hx:  OB History    Para Term  AB Living   2       1     SAB IAB Ectopic Multiple Live Births   1 0 0 0 0      # Outcome Date GA Lbr Jonathan/2nd Weight Sex Delivery Anes PTL Lv   2 Current            1 SAB 2020 7w0d              Past Medical Hx:  Past Medical History:   Diagnosis Date    Acne  Anemia     Migraine     Varicella        Past Surgical hx:  Past Surgical History:   Procedure Laterality Date    NO PAST SURGERIES      WISDOM TOOTH EXTRACTION         Social Hx:  Alcohol use: none  Tobacco use: none  Other substance use: none    Other:none   No Known Allergies    No medications prior to admission  Objective: There is no height or weight on file to calculate BMI  Physical Exam:  Physical Exam  Constitutional:       Appearance: Normal appearance  HENT:      Head: Normocephalic and atraumatic  Eyes:      Extraocular Movements: Extraocular movements intact  Cardiovascular:      Rate and Rhythm: Normal rate and regular rhythm  Pulses: Normal pulses  Heart sounds: Normal heart sounds  Pulmonary:      Effort: Pulmonary effort is normal       Breath sounds: Normal breath sounds  Abdominal:      General: There is no distension (gravid, non-tender on palpation)  Musculoskeletal:         General: Normal range of motion  Cervical back: Normal range of motion  Neurological:      General: No focal deficit present  Mental Status: She is alert and oriented to person, place, and time  Skin:     General: Skin is warm and dry     Psychiatric:         Mood and Affect: Mood normal          Behavior: Behavior normal               Lab Results   Component Value Date    WBC 11 84 (H) 06/18/2022    HGB 11 2 (L) 06/18/2022    HCT 34 6 (L) 06/18/2022     06/18/2022     Lab Results   Component Value Date    K 4 4 01/29/2021     01/29/2021    CO2 26 01/29/2021    BUN 10 01/29/2021    CREATININE 0 66 01/29/2021    AST 16 08/17/2021    ALT 15 08/17/2021     Prenatal Labs: Reviewed     Blood type: B Positive  Antibody: NA  GBS: negative   HIV: negative   Rubella: Immune  VDRL/RPR: Non reactive  HBsAg: Negative  Chlamydia: Negative  Gonorrhea: Negative  Diabetes 1 hour screen: 148/81/174  3 hour glucose: 77  Platelets: 728  Hgb: 11 2  >2 Midnights  INPATIENT Signature/Title: Gillian Lucas MD  Date: 9/1/2022  Time: 10:10 PM

## 2022-09-01 NOTE — ASSESSMENT & PLAN NOTE
Admit  CBC, RPR, Type & Screen  Anesthesia Consultation   NPO   SCDs for DVT ppx   Ballesteros catheter placement   IVF Bolus LR, followed by 125cc/hr   Continuous fetal monitoring and tocometry   Antibiotics  Ancef 1g

## 2022-09-02 ENCOUNTER — HOSPITAL ENCOUNTER (INPATIENT)
Facility: HOSPITAL | Age: 32
LOS: 2 days | Discharge: HOME | End: 2022-09-04
Attending: OBSTETRICS & GYNECOLOGY | Admitting: OBSTETRICS & GYNECOLOGY
Payer: COMMERCIAL

## 2022-09-02 ENCOUNTER — ANESTHESIA (INPATIENT)
Dept: LABOR AND DELIVERY | Facility: HOSPITAL | Age: 32
End: 2022-09-02
Payer: COMMERCIAL

## 2022-09-02 DIAGNOSIS — Z41.9 PATIENT-REQUESTED PROCEDURE: ICD-10-CM

## 2022-09-02 DIAGNOSIS — Z3A.32 32 WEEKS GESTATION OF PREGNANCY: Primary | ICD-10-CM

## 2022-09-02 DIAGNOSIS — Z98.891 STATUS POST PRIMARY LOW TRANSVERSE CESAREAN SECTION: ICD-10-CM

## 2022-09-02 LAB
ABO GROUP BLD: NORMAL
BASE EXCESS BLDCOA CALC-SCNC: -4.8 MMOL/L (ref 3–11)
BASE EXCESS BLDCOV CALC-SCNC: -3.3 MMOL/L (ref 1–9)
BASOPHILS # BLD AUTO: 0.03 THOUSANDS/ΜL (ref 0–0.1)
BASOPHILS NFR BLD AUTO: 0 % (ref 0–1)
BLD GP AB SCN SERPL QL: NEGATIVE
EOSINOPHIL # BLD AUTO: 0.06 THOUSAND/ΜL (ref 0–0.61)
EOSINOPHIL NFR BLD AUTO: 1 % (ref 0–6)
ERYTHROCYTE [DISTWIDTH] IN BLOOD BY AUTOMATED COUNT: 14.6 % (ref 11.6–15.1)
HCO3 BLDCOA-SCNC: 24.1 MMOL/L (ref 17.3–27.3)
HCO3 BLDCOV-SCNC: 23.3 MMOL/L (ref 12.2–28.6)
HCT VFR BLD AUTO: 37.3 % (ref 34.8–46.1)
HGB BLD-MCNC: 12.6 G/DL (ref 11.5–15.4)
IMM GRANULOCYTES # BLD AUTO: 0.07 THOUSAND/UL (ref 0–0.2)
IMM GRANULOCYTES NFR BLD AUTO: 1 % (ref 0–2)
LYMPHOCYTES # BLD AUTO: 1.54 THOUSANDS/ΜL (ref 0.6–4.47)
LYMPHOCYTES NFR BLD AUTO: 14 % (ref 14–44)
MCH RBC QN AUTO: 30.4 PG (ref 26.8–34.3)
MCHC RBC AUTO-ENTMCNC: 33.8 G/DL (ref 31.4–37.4)
MCV RBC AUTO: 90 FL (ref 82–98)
MONOCYTES # BLD AUTO: 0.47 THOUSAND/ΜL (ref 0.17–1.22)
MONOCYTES NFR BLD AUTO: 4 % (ref 4–12)
NEUTROPHILS # BLD AUTO: 8.94 THOUSANDS/ΜL (ref 1.85–7.62)
NEUTS SEG NFR BLD AUTO: 80 % (ref 43–75)
NRBC BLD AUTO-RTO: 0 /100 WBCS
O2 CT VFR BLDCOA CALC: 9.4 ML/DL
OXYHGB MFR BLDCOA: 41 %
OXYHGB MFR BLDCOV: 58.1 %
PCO2 BLDCOA: 59.8 MM[HG] (ref 30–60)
PCO2 BLDCOV: 47 MM HG (ref 27–43)
PH BLDCOA: 7.22 [PH] (ref 7.23–7.43)
PH BLDCOV: 7.31 [PH] (ref 7.19–7.49)
PLATELET # BLD AUTO: 272 THOUSANDS/UL (ref 149–390)
PMV BLD AUTO: 10.2 FL (ref 8.9–12.7)
PO2 BLDCOA: 21.1 MM HG (ref 5–25)
PO2 BLDCOV: 25.8 MM HG (ref 15–45)
RBC # BLD AUTO: 4.15 MILLION/UL (ref 3.81–5.12)
RH BLD: POSITIVE
RPR SER QL: NORMAL
SAO2 % BLDCOV: 13.5 ML/DL
SPECIMEN EXPIRATION DATE: NORMAL
WBC # BLD AUTO: 11.11 THOUSAND/UL (ref 4.31–10.16)

## 2022-09-02 PROCEDURE — 86901 BLOOD TYPING SEROLOGIC RH(D): CPT

## 2022-09-02 PROCEDURE — 59510 CESAREAN DELIVERY: CPT | Performed by: OBSTETRICS & GYNECOLOGY

## 2022-09-02 PROCEDURE — NC001 PR NO CHARGE: Performed by: OBSTETRICS & GYNECOLOGY

## 2022-09-02 PROCEDURE — 86592 SYPHILIS TEST NON-TREP QUAL: CPT

## 2022-09-02 PROCEDURE — 86850 RBC ANTIBODY SCREEN: CPT

## 2022-09-02 PROCEDURE — 85025 COMPLETE CBC W/AUTO DIFF WBC: CPT

## 2022-09-02 PROCEDURE — 86900 BLOOD TYPING SEROLOGIC ABO: CPT

## 2022-09-02 PROCEDURE — 82805 BLOOD GASES W/O2 SATURATION: CPT | Performed by: OBSTETRICS & GYNECOLOGY

## 2022-09-02 RX ORDER — IBUPROFEN 600 MG/1
600 TABLET ORAL EVERY 6 HOURS
Status: DISCONTINUED | OUTPATIENT
Start: 2022-09-04 | End: 2022-09-04 | Stop reason: HOSPADM

## 2022-09-02 RX ORDER — DOCUSATE SODIUM 100 MG/1
100 CAPSULE, LIQUID FILLED ORAL 2 TIMES DAILY
Status: DISCONTINUED | OUTPATIENT
Start: 2022-09-02 | End: 2022-09-04 | Stop reason: HOSPADM

## 2022-09-02 RX ORDER — DEXAMETHASONE SODIUM PHOSPHATE 4 MG/ML
8 INJECTION, SOLUTION INTRA-ARTICULAR; INTRALESIONAL; INTRAMUSCULAR; INTRAVENOUS; SOFT TISSUE ONCE AS NEEDED
Status: DISPENSED | OUTPATIENT
Start: 2022-09-02 | End: 2022-09-03

## 2022-09-02 RX ORDER — VALACYCLOVIR HYDROCHLORIDE 500 MG/1
500 TABLET, FILM COATED ORAL DAILY
Status: DISCONTINUED | OUTPATIENT
Start: 2022-09-02 | End: 2022-09-04 | Stop reason: HOSPADM

## 2022-09-02 RX ORDER — ACETAMINOPHEN 325 MG/1
650 TABLET ORAL EVERY 6 HOURS SCHEDULED
Status: DISCONTINUED | OUTPATIENT
Start: 2022-09-02 | End: 2022-09-02

## 2022-09-02 RX ORDER — ONDANSETRON 2 MG/ML
4 INJECTION INTRAMUSCULAR; INTRAVENOUS EVERY 8 HOURS PRN
Status: DISCONTINUED | OUTPATIENT
Start: 2022-09-02 | End: 2022-09-02

## 2022-09-02 RX ORDER — SODIUM CHLORIDE, SODIUM LACTATE, POTASSIUM CHLORIDE, CALCIUM CHLORIDE 600; 310; 30; 20 MG/100ML; MG/100ML; MG/100ML; MG/100ML
125 INJECTION, SOLUTION INTRAVENOUS CONTINUOUS
Status: DISCONTINUED | OUTPATIENT
Start: 2022-09-02 | End: 2022-09-04 | Stop reason: HOSPADM

## 2022-09-02 RX ORDER — OXYTOCIN/RINGER'S LACTATE 30/500 ML
PLASTIC BAG, INJECTION (ML) INTRAVENOUS CONTINUOUS PRN
Status: DISCONTINUED | OUTPATIENT
Start: 2022-09-02 | End: 2022-09-02

## 2022-09-02 RX ORDER — ONDANSETRON 2 MG/ML
INJECTION INTRAMUSCULAR; INTRAVENOUS AS NEEDED
Status: DISCONTINUED | OUTPATIENT
Start: 2022-09-02 | End: 2022-09-02

## 2022-09-02 RX ORDER — KETOROLAC TROMETHAMINE 30 MG/ML
30 INJECTION, SOLUTION INTRAMUSCULAR; INTRAVENOUS EVERY 6 HOURS
Status: DISCONTINUED | OUTPATIENT
Start: 2022-09-02 | End: 2022-09-02

## 2022-09-02 RX ORDER — ONDANSETRON 2 MG/ML
4 INJECTION INTRAMUSCULAR; INTRAVENOUS EVERY 8 HOURS PRN
Status: DISCONTINUED | OUTPATIENT
Start: 2022-09-03 | End: 2022-09-04 | Stop reason: HOSPADM

## 2022-09-02 RX ORDER — ONDANSETRON 4 MG/1
4 TABLET, ORALLY DISINTEGRATING ORAL EVERY 6 HOURS PRN
Status: DISCONTINUED | OUTPATIENT
Start: 2022-09-02 | End: 2022-09-02

## 2022-09-02 RX ORDER — KETOROLAC TROMETHAMINE 30 MG/ML
30 INJECTION, SOLUTION INTRAMUSCULAR; INTRAVENOUS EVERY 6 HOURS SCHEDULED
Status: COMPLETED | OUTPATIENT
Start: 2022-09-02 | End: 2022-09-03

## 2022-09-02 RX ORDER — MORPHINE SULFATE 1 MG/ML
INJECTION, SOLUTION EPIDURAL; INTRATHECAL; INTRAVENOUS AS NEEDED
Status: DISCONTINUED | OUTPATIENT
Start: 2022-09-02 | End: 2022-09-02

## 2022-09-02 RX ORDER — METOCLOPRAMIDE HYDROCHLORIDE 5 MG/ML
5 INJECTION INTRAMUSCULAR; INTRAVENOUS EVERY 6 HOURS PRN
Status: ACTIVE | OUTPATIENT
Start: 2022-09-02 | End: 2022-09-03

## 2022-09-02 RX ORDER — CEFAZOLIN SODIUM 1 G/3ML
INJECTION, POWDER, FOR SOLUTION INTRAMUSCULAR; INTRAVENOUS AS NEEDED
Status: DISCONTINUED | OUTPATIENT
Start: 2022-09-02 | End: 2022-09-02

## 2022-09-02 RX ORDER — CEFAZOLIN SODIUM 1 G/50ML
1000 SOLUTION INTRAVENOUS ONCE
Status: DISCONTINUED | OUTPATIENT
Start: 2022-09-02 | End: 2022-09-02

## 2022-09-02 RX ORDER — CALCIUM CARBONATE 200(500)MG
1000 TABLET,CHEWABLE ORAL DAILY PRN
Status: DISCONTINUED | OUTPATIENT
Start: 2022-09-02 | End: 2022-09-04 | Stop reason: HOSPADM

## 2022-09-02 RX ORDER — FENTANYL CITRATE/PF 50 MCG/ML
50 SYRINGE (ML) INJECTION
Status: DISCONTINUED | OUTPATIENT
Start: 2022-09-02 | End: 2022-09-04 | Stop reason: HOSPADM

## 2022-09-02 RX ORDER — HYDROMORPHONE HCL/PF 1 MG/ML
0.5 SYRINGE (ML) INJECTION
Status: DISCONTINUED | OUTPATIENT
Start: 2022-09-02 | End: 2022-09-04 | Stop reason: HOSPADM

## 2022-09-02 RX ORDER — KETOROLAC TROMETHAMINE 30 MG/ML
INJECTION, SOLUTION INTRAMUSCULAR; INTRAVENOUS AS NEEDED
Status: DISCONTINUED | OUTPATIENT
Start: 2022-09-02 | End: 2022-09-02

## 2022-09-02 RX ORDER — ACETAMINOPHEN 325 MG/1
650 TABLET ORAL EVERY 6 HOURS
Status: DISCONTINUED | OUTPATIENT
Start: 2022-09-02 | End: 2022-09-04 | Stop reason: HOSPADM

## 2022-09-02 RX ORDER — BUPIVACAINE HYDROCHLORIDE 7.5 MG/ML
INJECTION, SOLUTION INTRASPINAL AS NEEDED
Status: DISCONTINUED | OUTPATIENT
Start: 2022-09-02 | End: 2022-09-02

## 2022-09-02 RX ORDER — METOCLOPRAMIDE HYDROCHLORIDE 5 MG/ML
10 INJECTION INTRAMUSCULAR; INTRAVENOUS ONCE AS NEEDED
Status: DISCONTINUED | OUTPATIENT
Start: 2022-09-02 | End: 2022-09-04 | Stop reason: HOSPADM

## 2022-09-02 RX ORDER — ONDANSETRON 2 MG/ML
4 INJECTION INTRAMUSCULAR; INTRAVENOUS EVERY 4 HOURS PRN
Status: DISPENSED | OUTPATIENT
Start: 2022-09-02 | End: 2022-09-03

## 2022-09-02 RX ORDER — OXYTOCIN/RINGER'S LACTATE 30/500 ML
62.5 PLASTIC BAG, INJECTION (ML) INTRAVENOUS ONCE
Status: COMPLETED | OUTPATIENT
Start: 2022-09-02 | End: 2022-09-02

## 2022-09-02 RX ORDER — NALOXONE HYDROCHLORIDE 0.4 MG/ML
0.1 INJECTION, SOLUTION INTRAMUSCULAR; INTRAVENOUS; SUBCUTANEOUS
Status: ACTIVE | OUTPATIENT
Start: 2022-09-02 | End: 2022-09-03

## 2022-09-02 RX ORDER — DEXAMETHASONE SODIUM PHOSPHATE 10 MG/ML
INJECTION, SOLUTION INTRAMUSCULAR; INTRAVENOUS AS NEEDED
Status: DISCONTINUED | OUTPATIENT
Start: 2022-09-02 | End: 2022-09-02

## 2022-09-02 RX ORDER — OXYCODONE HYDROCHLORIDE AND ACETAMINOPHEN 5; 325 MG/1; MG/1
1 TABLET ORAL EVERY 6 HOURS PRN
Status: DISCONTINUED | OUTPATIENT
Start: 2022-09-02 | End: 2022-09-03

## 2022-09-02 RX ORDER — EPHEDRINE SULFATE 50 MG/ML
INJECTION INTRAVENOUS AS NEEDED
Status: DISCONTINUED | OUTPATIENT
Start: 2022-09-02 | End: 2022-09-02

## 2022-09-02 RX ORDER — SODIUM CHLORIDE, SODIUM LACTATE, POTASSIUM CHLORIDE, CALCIUM CHLORIDE 600; 310; 30; 20 MG/100ML; MG/100ML; MG/100ML; MG/100ML
INJECTION, SOLUTION INTRAVENOUS CONTINUOUS PRN
Status: DISCONTINUED | OUTPATIENT
Start: 2022-09-02 | End: 2022-09-02

## 2022-09-02 RX ORDER — DIPHENHYDRAMINE HYDROCHLORIDE 50 MG/ML
25 INJECTION INTRAMUSCULAR; INTRAVENOUS EVERY 6 HOURS PRN
Status: ACTIVE | OUTPATIENT
Start: 2022-09-02 | End: 2022-09-03

## 2022-09-02 RX ADMIN — SODIUM CHLORIDE, SODIUM LACTATE, POTASSIUM CHLORIDE, AND CALCIUM CHLORIDE: .6; .31; .03; .02 INJECTION, SOLUTION INTRAVENOUS at 07:42

## 2022-09-02 RX ADMIN — EPHEDRINE SULFATE 5 MG: 50 INJECTION, SOLUTION INTRAVENOUS at 08:34

## 2022-09-02 RX ADMIN — DEXAMETHASONE SODIUM PHOSPHATE 8 MG: 4 INJECTION INTRA-ARTICULAR; INTRALESIONAL; INTRAMUSCULAR; INTRAVENOUS; SOFT TISSUE at 20:11

## 2022-09-02 RX ADMIN — ONDANSETRON 4 MG: 2 INJECTION INTRAMUSCULAR; INTRAVENOUS at 07:56

## 2022-09-02 RX ADMIN — Medication 62.5 MILLI-UNITS/MIN: at 09:30

## 2022-09-02 RX ADMIN — KETOROLAC TROMETHAMINE 30 MG: 30 INJECTION, SOLUTION INTRAMUSCULAR at 13:12

## 2022-09-02 RX ADMIN — BUPIVACAINE HYDROCHLORIDE IN DEXTROSE 1.6 ML: 7.5 INJECTION, SOLUTION SUBARACHNOID at 07:55

## 2022-09-02 RX ADMIN — SODIUM CHLORIDE, SODIUM LACTATE, POTASSIUM CHLORIDE, AND CALCIUM CHLORIDE 999 ML/HR: .6; .31; .03; .02 INJECTION, SOLUTION INTRAVENOUS at 06:55

## 2022-09-02 RX ADMIN — SODIUM CHLORIDE, SODIUM LACTATE, POTASSIUM CHLORIDE, AND CALCIUM CHLORIDE 125 ML/HR: .6; .31; .03; .02 INJECTION, SOLUTION INTRAVENOUS at 09:31

## 2022-09-02 RX ADMIN — ONDANSETRON 4 MG: 2 INJECTION INTRAMUSCULAR; INTRAVENOUS at 16:57

## 2022-09-02 RX ADMIN — ONDANSETRON 4 MG: 2 INJECTION INTRAMUSCULAR; INTRAVENOUS at 11:36

## 2022-09-02 RX ADMIN — SODIUM CHLORIDE, SODIUM LACTATE, POTASSIUM CHLORIDE, AND CALCIUM CHLORIDE 125 ML/HR: .6; .31; .03; .02 INJECTION, SOLUTION INTRAVENOUS at 17:50

## 2022-09-02 RX ADMIN — Medication 250 MILLI-UNITS/MIN: at 08:13

## 2022-09-02 RX ADMIN — KETOROLAC TROMETHAMINE 30 MG: 30 INJECTION, SOLUTION INTRAMUSCULAR at 19:48

## 2022-09-02 RX ADMIN — DEXAMETHASONE SODIUM PHOSPHATE 10 MG: 10 INJECTION, SOLUTION INTRAMUSCULAR; INTRAVENOUS at 07:56

## 2022-09-02 RX ADMIN — MORPHINE SULFATE 0.15 MG: 1 INJECTION, SOLUTION EPIDURAL; INTRATHECAL; INTRAVENOUS at 07:55

## 2022-09-02 RX ADMIN — PHENYLEPHRINE HYDROCHLORIDE 50 MCG/MIN: 10 INJECTION INTRAVENOUS at 07:55

## 2022-09-02 RX ADMIN — EPHEDRINE SULFATE 5 MG: 50 INJECTION, SOLUTION INTRAVENOUS at 07:59

## 2022-09-02 RX ADMIN — DEXAMETHASONE SODIUM PHOSPHATE 8 MG: 4 INJECTION INTRA-ARTICULAR; INTRALESIONAL; INTRAMUSCULAR; INTRAVENOUS; SOFT TISSUE at 14:31

## 2022-09-02 RX ADMIN — CEFAZOLIN SODIUM 1000 MG: 1 INJECTION, POWDER, FOR SOLUTION INTRAMUSCULAR; INTRAVENOUS at 08:00

## 2022-09-02 RX ADMIN — KETOROLAC TROMETHAMINE 30 MG: 30 INJECTION, SOLUTION INTRAMUSCULAR at 08:52

## 2022-09-02 NOTE — QUICK NOTE
Patient was evaluated post-operatively  She complains of nausea that is not relieved by Zofran and has recently received a dose of decadron  She reports that she has not been able to keep any food down since the procedure  Ballesteros is still in place but she has good urin out put 450cc ( 1cc/hg/hr)  Incision is dry and intact  Patient has minimal lochia and has been breast feeding baby  Patient is otherwise well controlled by PO medMD Lobo Robertson

## 2022-09-02 NOTE — LACTATION NOTE
This note was copied from a baby's chart  CONSULT - LACTATION  Baby Boy (Rati) Cullen 0 days male MRN: [id number]    [facility] NURSERY Room / Bed: (N)/(N) Encounter: [id number]    Maternal Information     MOTHER:  Alondra Berman  Maternal Age: 27 y o    OB History: # 1 - Date: 2020, Sex: None, Weight: None, GA: 7w0d, Delivery: None, Apgar1: None, Apgar5: None, Living: None, Birth Comments: None    # 2 - Date: 22, Sex: Male, Weight: 3204 g (7 lb 1 oz), GA: 39w0d, Delivery: , Low Transverse, Apgar1: 9, Apgar5: 9, Living: Living, Birth Comments: None   Previouse breast reduction surgery? No    Lactation history:   Has patient previously breast fed: No   How long had patient previously breast fed:     Previous breast feeding complications:       Past Surgical History:   Procedure Laterality Date    NO PAST SURGERIES      WISDOM TOOTH EXTRACTION          Birth information:  YOB: 2024   Time of birth: 9:16 AM   Sex: male   Delivery type: , Low Transverse   Birth Weight: 3204 g (7 lb 1 oz)   Percent of Weight Change: 0%     Gestational Age: 39w0d   [unfilled]    Assessment     Breast and nipple assessment: normal assessment    [place] Assessment: normal assessment    Feeding assessment: baby sleepy  LATCH:  Latch: Repeated attempts, hold nipple in mouth, stimulate to suck   Audible Swallowing: A few with stimulation   Type of Nipple: Everted (After stimulation)   Comfort (Breast/Nipple): Soft/non-tender   Hold (Positioning): Full assist, staff holds infant at breast   LATCH Score: 6          Feeding recommendations:  Place baby to breast every 2-3 hours, do skin to skin before feed  Met with Jesika Lanza and provided her with Ready Set Baby Booklet which contained information on:  Hand expression with access to QR codes to review hand expression  Positioning and latch reviewed as well as images of other feeding positions    Discussed the properties of a good latch in any position  Feeding on cue and what that means for recognizing infant's hunger  Skin to Skin contact and benefits to mom and baby  Avoidance of pacifiers for the first month discussed  Gave information on common concerns, what to expect the first few weeks after delivery, preparing for other caregivers, and how partners can help  Resources for support also provided  Helped Madison Salas position infant up at chest level using pillows for support, aligning nose to nipple and dragged nipple down to chin to achieve a wide deep latch  Reminded mom to bring baby to breast and not breast to baby ( no hunching)  Then used areolar compression to achieve a deep latch that was comfortable and exchanged optimum amounts of colostrum  Stressed importance of good alignment ( baby's ear, shoulder and hip in a straight line )   Madison Salas was unable to maintain position, baby breast fed for 5 minutes on the right side with lactation holding position  Also demonstrated hand expression as Madison Salas didn't think she had any breast milk  Was able to get droplets of colostrum with hand expression  Discharge Breastfeeding Booklet was left at bedside for mom to review later  Encouraged her to call for breastfeeding support as needed          Jonathan Watson RN 9/2/2022 2:16 PM

## 2022-09-02 NOTE — LACTATION NOTE
This note was copied from a baby's chart  Follow up Lactation: Mom is attempting at the breast  Education on positioning and alignment  Encouraged HE prior to latch  Moved Lincoln up to the right breast with pillows under the baby to light him up to the side of the breast  Deep latch with cheek touching the breast  Active, coordinated sucking  Breast compressions demonstrated with teach-back  Timing of feeds and signs of satiation reviewed  Mom is encouraged not to pump for 4-6 weeks/ Encouraged to call baby and me to establish pumping rourtine prior to going back to work  Use of Haakaa reviewed  Medela pump: Fit flanges so nipple easily moves through tunnel  Press the on button  Pump will automatically start in stimulation mode  After 2 minutes, pump will cycle to expression mode  Use the + and - buttons to increase & decrease suction  After milk stops expressing from the nipple, press the button with the image of the milk droplets  This will take your pump back to stimulation mode  Allow pump to stimulate the breast for another 2 min  Allow the pump to move into expression mode  Cycle between Stimulation and Expression mode at least 3 times in a 20 min  Pumping session  Enc  To call lactation for additional support  Education on s2s for feedings  Encouraged hand expression prior to latch  Education on baby's body alignment; belly to belly with mom; ear, shoulder hip alignment, long neck, chin / cheek touching cheek  Reviewed how baby can breathe at the breast  Tugging sensation, no pinching/pain  Provided demonstration, education and support of deep latch to breast by placing the nipple to the nose, dragging down to chin to achieve a wide latch  Bring baby to the breast, not breast to baby  Move your shoulders down and away from your ears  Look for ear, shoulder, hip alignment   Baby's upper and lower lip should be flanged on the breast     Milk Supply:   - Allow for non-nutritive suck at the breast to stimulate supply   - Allow for skin to skin during and after each breastfeeding session   - Use massage, heat, and hand expression prior to feedings to assist with deep latch    Discussed s/s engorgement, blocked milk ducts, and mastitis  Discussed how to remedy at home and when to contact physician  Reviewed engorgement and ways to reduce symptoms  Use a warmth on breasts with massage prior to feeding / pumping  Use massage during pumping over full ducts  Used cold, compression on breasts after feeding/pumping session  Ideas are rice in a sock  Place one in the freezer for cool and one in the microwave for warmth  Referred to discharge book / engorgement page  - Start feedings on breast that last feeding ended   - allow no more than 3 hours between breast feeding sessions   - time between feedings is counted from the beginning of the first feed to the beginning of the next feeding session    Reviewed early signs of hunger, including tensing of hands and shoulders - no need to wait for open eyes  Crying is a late hunger sign  If baby is crying, soothe baby first and then attempt to latch  Reviewed normal sucking patterns: transition from stimulation to nutritive to release or non-nutritive  The goal is to see and hear lots of swallowing  Reviewed normal nursing pattern: infant could latch on one breast up to 30 minutes or until releases on own  Signs of satiation is open hand with fingers that do not grab your finger    Discussed difference in sensation of non-nutritive v nutritive sucking

## 2022-09-02 NOTE — ANESTHESIA PREPROCEDURE EVALUATION
Procedure:   SECTION () (N/A Uterus)    Relevant Problems   GYN   (+) 39 weeks gestation of pregnancy      NEURO/PSYCH   (+) H/O cold sores        Physical Exam    Airway    Mallampati score: I  TM Distance: >3 FB  Neck ROM: full     Dental   No notable dental hx     Cardiovascular      Pulmonary      Other Findings        Anesthesia Plan  ASA Score- 2     Anesthesia Type- spinal with ASA Monitors  Additional Monitors:   Airway Plan:           Plan Factors-Exercise tolerance (METS): >4 METS  Chart reviewed  Existing labs reviewed  Patient summary reviewed  Patient is not a current smoker  Induction- intravenous  Postoperative Plan- Plan for postoperative opioid use  Informed Consent- Anesthetic plan and risks discussed with patient  I personally reviewed this patient with the CRNA  Discussed and agreed on the Anesthesia Plan with the CRNA  Chintan Barksdale

## 2022-09-02 NOTE — ANESTHESIA PROCEDURE NOTES
Spinal Block    Patient location during procedure: OB  Start time: 9/2/2022 7:55 AM  Reason for block: primary anesthetic  Staffing  Performed: Anesthesiologist and CRNA   Anesthesiologist: Moustapha Obregon MD  Resident/CRNA: Jaime Grace CRNA  Preanesthetic Checklist  Completed: patient identified, IV checked, site marked, risks and benefits discussed, surgical consent, monitors and equipment checked, pre-op evaluation and timeout performed  Spinal Block  Patient position: sitting  Prep: ChloraPrep  Patient monitoring: frequent blood pressure checks, continuous pulse ox and heart rate  Approach: midline  Location: L3-4  Injection technique: single-shot  Needle  Needle type: pencil-tip   Needle gauge: 25 G  Needle length: 10 cm  Assessment  Sensory level: T4  Events: cerebrospinal fluid  Injection Assessment:  negative aspiration for heme, no paresthesia on injection and positive aspiration for clear CSF    Post-procedure:  site cleaned

## 2022-09-02 NOTE — OP NOTE
OPERATIVE REPORT  PATIENT NAME: Doron Fragoso    :  1990  MRN: [id number]  Pt Location: [facility] L&D OR ROOM 02    SURGERY DATE: 2022    Surgeon(s) and Role:     * Nel Can MD - Primary     * Crow Quinones MD - Assisting    Preop Diagnosis:  Primary LTCS due to maternal request    Post-Op Diagnosis Codes:     Primary LTCS due to maternal request    Procedure(s) (LRB):   SECTION () (N/A)    Specimen(s):  ID Type Source Tests Collected by Time Destination   A :  Cord Blood Cord BLOOD GAS, VENOUS, CORD, BLOOD GAS, ARTERIAL, CORD Nel Can MD 2022 [id number]    B :  Tissue (Placenta on Hold) OB Only Placenta PLACENTA IN STORAGE Nel Can MD 2022 [id number]        QBL: 495 ml    Drains:  Urethral Catheter Double-lumen;Non-latex 16 Fr  (Active)   Reasons to continue Urinary Catheter  Post-operative urological requirements 22 0744   Goal for Removal Remove POD#1 22 0744   Site Assessment Clean;Skin intact 22 0744   Collection Container Standard drainage bag 22 0744   Number of days: 0       Anesthesia Type:   Spinal     Operative Indications:     Primary LTCS due to maternal request      Complications:   None        Operative Findings:  1  Viable   at 9332 with APGARs of 9 and 9 at 1 and 5 minutes  Fetus weighted 7lb 0oz  2  Normal intact placenta with centrally inserted 3VC expressed at 0814  3  Normal uterus, bilateral tubes and ovaries  4  Blood gases:   Arterial pH: 7 233   Arterial base excess: -4 8   Venous pH: 7 313   Venous base excess: -3 3    The patient was taken to the operating room  Spinal anesthesia was adequately established and 1g of Ancef was given for preoperative prophylaxis  The patient was then placed in the dorsal supine position with a left tilt of the hips   The patient was then prepped with betadine and chlorhexidene for vaginal prep and chloraprep for abdominal prep and draped in the usual sterile fashion for a pfannenielstiel skin incision  A time out was performed to confirm correct patient and correct procedure  An incision was made in the skin with a surgical scalpel  Bovie electrocautery was used to dissect over subsequent layers of tissue including the fascia  The fascia was incised at the midline and the fascial incision was extended bilaterally using Bovie electrocautery  The superior edge of the fascial incision was grasped with Kocher clamps, tented up and the underlying rectus muscles were dissected off bluntly  The rectus muscles were then divided at midline and the peritoneum was identified, tented up at its upper margin taking care to avoid the bladder, and then entered  The peritoneal incision was extended superiorly and inferiorly  The bladder blade was inserted and the vesicouterine peritoneum was identified, grasped with forceps and cut laterally to both sides using the Metzenbaum scissors  A bladder blade was reinserted and a transverse incision was made in the lower uterine segment using a new surgical blade  The uterine incision was extended cephalad and caudal using blunt dissection  The amniotic sac was entered and the amniotic fluid was noted to be clear   The surgeon's hand was placed into the uterine cavity  The fetal head was identified and elevated through the uterine incision with the assistance of fundal pressure  With gentle traction, the shoulder was delivered, followed by the rest of the fetal body  There was no nuchal cord noted  On delivery the cord was doubly clamped and cut after delayed cord clamping  The infant was then passed off the table to the awaiting  staff  The  was noted to cry spontaneously and moved all extremities  Venous and arterial blood gas, cord blood, and portion of cord was obtained for analysis and routine blood testing  The placenta delivery was then sent to storage   Placenta was noted to be intact with a centrally inserted three-vessel cord  Oxytocin was administered by IV infusion to enhance uterine contraction  The uterine incision was re approximated using a 0-Vicryl  in a running locked fashion  A second vertical imbricating stitch with 0-vicryl was applied  A figure of eight suture was used with 0-vicryl to achieve hemostasis  The uterine incision was examined and noted to be hemostatic  The pericolic gutters were cleared of all clots  The uterine incision was once again reexamined and noted to be hemostatic  The rectus muscles were re-approximated with running non-locked suture  The fascia was re approximated using 0- vicryl  in a running nonlocked fashion  The subcutaneous tissue was irrigated and cleared of all clots and debris  Good hemostasis was noted with Bovie electrocautery  The subcutaneous  tissue was reapproximated with 0-vicryl  The skin incision was closed using 4-0 monocryl  Good hemostasis was noted  Patient tolerated the procedure well  All needle, sponge, and instrument counts were noted to be correct x 2 at the end of the procedure  Patient was transferred to the recovery room in stable condition  Dr Michael Hope was present for the procedure            Patient Disposition:  PACU       SIGNATURE: Rody Qureshi MD  DATE: September 2, 2022  TIME: 9:21 AM

## 2022-09-02 NOTE — PLAN OF CARE
Problem: PAIN - ADULT  Goal: Verbalizes/displays adequate comfort level or baseline comfort level  Description: Interventions:  - Encourage patient to monitor pain and request assistance  - Assess pain using appropriate pain scale  - Administer analgesics based on type and severity of pain and evaluate response  - Implement non-pharmacological measures as appropriate and evaluate response  - Consider cultural and social influences on pain and pain management  - Notify physician/advanced practitioner if interventions unsuccessful or patient reports new pain  Outcome: Progressing     Problem: INFECTION - ADULT  Goal: Absence or prevention of progression during hospitalization  Description: INTERVENTIONS:  - Assess and monitor for signs and symptoms of infection  - Monitor lab/diagnostic results  - Monitor all insertion sites, i e  indwelling lines, tubes, and drains  - [place] appropriate cooling/warming therapies per order  - Administer medications as ordered  - Instruct and encourage patient and family to use good hand hygiene technique  - Identify and instruct in appropriate isolation precautions for identified infection/condition  Outcome: Progressing  Goal: Absence of fever/infection during neutropenic period  Description: INTERVENTIONS:  - Monitor WBC    Outcome: Progressing     Problem: SAFETY ADULT  Goal: Patient will remain free of falls  Description: INTERVENTIONS:  - Educate patient/family on patient safety including physical limitations  - Instruct patient to call for assistance with activity   - Keep Call bell within reach  - Keep bed low and locked with side rails adjusted as appropriate  - Keep care items and personal belongings within reach  - Initiate and maintain comfort rounds  - Make Fall Risk Sign visible to staff  Outcome: Progressing  Goal: Maintain or return to baseline ADL function  Description: INTERVENTIONS:  -  Assess patient's ability to carry out ADLs; assess patient's baseline for ADL function and identify physical deficits which impact ability to perform ADLs (bathing, care of mouth/teeth, toileting, grooming, dressing, etc )  - Assess/evaluate cause of self-care deficits   - Assess range of motion  - Assess patient's mobility; develop plan if impaired  - Assess patient's need for assistive devices and provide as appropriate  - Encourage maximum independence but intervene and supervise when necessary  - Provide patient education as appropriate  Outcome: Progressing  Goal: Maintains/Returns to pre admission functional level  Description: INTERVENTIONS:  - Perform BMAT or MOVE assessment daily    - Set and communicate daily mobility goal to care team and patient/family/caregiver  - Collaborate with rehabilitation services on mobility goals if consulted  - Out of bed for toileting  - Record patient progress and toleration of activity level   Outcome: Progressing     Problem: DISCHARGE PLANNING  Goal: Discharge to home or other facility with appropriate resources  Description: INTERVENTIONS:  - Identify barriers to discharge w/patient and caregiver  - Arrange for needed discharge resources and transportation as appropriate  - Identify discharge learning needs (meds, wound care, etc )  - Arrange for interpretive services to assist at discharge as needed  - Refer to Case Management Department for coordinating discharge planning if the patient needs post-hospital services based on physician/advanced practitioner order or complex needs related to functional status, cognitive ability, or social support system  Outcome: Progressing     Problem: Knowledge Deficit  Goal: Patient/family/caregiver demonstrates understanding of disease process, treatment plan, medications, and discharge instructions  Description: Complete learning assessment and assess knowledge base    Interventions:  - Provide teaching at level of understanding  - Provide teaching via preferred learning methods  Outcome: Progressing Problem: POSTPARTUM  Goal: Experiences normal postpartum course  Description: INTERVENTIONS:  - Monitor maternal vital signs  - Assess uterine involution and lochia  Outcome: Progressing  Goal: Appropriate maternal -  bonding  Description: INTERVENTIONS:  - Identify family support  - Assess for appropriate maternal/infant bonding   -Encourage maternal/infant bonding opportunities  - Referral to  or  as needed  Outcome: Progressing  Goal: Establishment of infant feeding pattern  Description: INTERVENTIONS:  - Assess breast/bottle feeding  - Refer to lactation as needed  Outcome: Progressing  Goal: Incision(s), wounds(s) or drain site(s) healing without S/S of infection  Description: INTERVENTIONS  - Assess and document dressing, incision, wound bed, drain sites and surrounding tissue  - Provide patient and family education    Outcome: Progressing

## 2022-09-02 NOTE — DISCHARGE SUMMARY
Obstetrics Discharge Summary   Cesia Caballero 27 y o  female MRN: [id number]  Unit/Bed#: LD PACU- Encounter: [id number]    Admission Date: 2022     Discharge Date: 2022    Admitting Diagnoses:   39w0d gestational age pregnancy     Discharge Diagnoses:   Same, delivered      Procedures:   primary  section, low transverse incision      Admitting Attending: Dr Sophia Rice   Delivery Attending: Dr Sophia Rice   Discharge Attending: Dr Randy Garza Course:   Doron Fragoso is now a 27 y o  Cheryl Pedro who was initially admitted at 39w0d for 1LTCS per maternal request      She underwent an uncomplicated  low transverse  section delivery on 2022 and delivered a viable male  at 200  APGARS were 9, 9 at 1 and 5 minutes, respectively  's birth weight was 7lb 1oz  Placenta delivered  without difficulty   was admitted to the  nursery  Patient tolerated the procedure well and was transferred to recovery in stable condition  The patient's post partum course was unremarkable    Her preoperative hemoglobin was 12 6 g/dL, postoperative was 11 3  Her postoperative pain was well controlled with oral analgesics  On day of discharge, she was ambulating and able to reasonably perform all ADLs  She was voiding and had appropriate bowel function  Pain was well controlled  She was discharged home on post-operative day #2 without complications  Patient was instructed to follow up with her OB as an outpatient and was given appropriate warnings to call provider if she develops signs of infection or uncontrolled pain  She is breast feeding   Mom's blood type is B positive   RhoGAM was not indicated  Complications:   None    Condition at discharge:   good     Provisions for Follow-Up Care:  See after visit summary for information related to follow-up care and any pertinent home health orders        Disposition:   Home    Planned Readmission:   No    Discharge Medications:   Please see AVS for a complete list of discharge medications  Discharge instructions :   Please see AVS for complete discharge instructions      Conny Holter, MD  OBGYN, PGY-1

## 2022-09-02 NOTE — ANESTHESIA POSTPROCEDURE EVALUATION
Post-Op Assessment Note    CV Status:  Stable  Pain Score: 0    Pain management: adequate     Mental Status:  Alert and awake   Hydration Status:  Euvolemic   PONV Controlled:  Controlled   Airway Patency:  Patent      Post Op Vitals Reviewed: Yes      Staff: CRNA, Anesthesiologist         No complications documented      BP   139/63   Temp   96 7   Pulse  77   Resp   14   SpO2   98%

## 2022-09-03 LAB
ERYTHROCYTE [DISTWIDTH] IN BLOOD BY AUTOMATED COUNT: 14.6 % (ref 11.6–15.1)
HCT VFR BLD AUTO: 34 % (ref 34.8–46.1)
HGB BLD-MCNC: 11.3 G/DL (ref 11.5–15.4)
MCH RBC QN AUTO: 30.5 PG (ref 26.8–34.3)
MCHC RBC AUTO-ENTMCNC: 33.2 G/DL (ref 31.4–37.4)
MCV RBC AUTO: 92 FL (ref 82–98)
PLATELET # BLD AUTO: 286 THOUSANDS/UL (ref 149–390)
PMV BLD AUTO: 10 FL (ref 8.9–12.7)
RBC # BLD AUTO: 3.71 MILLION/UL (ref 3.81–5.12)
WBC # BLD AUTO: 21.38 THOUSAND/UL (ref 4.31–10.16)

## 2022-09-03 PROCEDURE — 99024 POSTOP FOLLOW-UP VISIT: CPT | Performed by: OBSTETRICS & GYNECOLOGY

## 2022-09-03 PROCEDURE — 85027 COMPLETE CBC AUTOMATED: CPT

## 2022-09-03 RX ORDER — OXYCODONE HYDROCHLORIDE 5 MG/1
5 TABLET ORAL EVERY 4 HOURS PRN
Status: DISCONTINUED | OUTPATIENT
Start: 2022-09-03 | End: 2022-09-04 | Stop reason: HOSPADM

## 2022-09-03 RX ORDER — OXYCODONE HYDROCHLORIDE 10 MG/1
10 TABLET ORAL EVERY 4 HOURS PRN
Status: DISCONTINUED | OUTPATIENT
Start: 2022-09-03 | End: 2022-09-04 | Stop reason: HOSPADM

## 2022-09-03 RX ADMIN — DOCUSATE SODIUM 100 MG: 100 CAPSULE, LIQUID FILLED ORAL at 13:06

## 2022-09-03 RX ADMIN — ONDANSETRON 4 MG: 2 INJECTION INTRAMUSCULAR; INTRAVENOUS at 00:16

## 2022-09-03 RX ADMIN — KETOROLAC TROMETHAMINE 30 MG: 30 INJECTION, SOLUTION INTRAMUSCULAR at 20:24

## 2022-09-03 RX ADMIN — ACETAMINOPHEN 650 MG: 325 TABLET ORAL at 18:00

## 2022-09-03 RX ADMIN — ACETAMINOPHEN 650 MG: 325 TABLET ORAL at 23:56

## 2022-09-03 RX ADMIN — KETOROLAC TROMETHAMINE 30 MG: 30 INJECTION, SOLUTION INTRAMUSCULAR at 05:54

## 2022-09-03 RX ADMIN — KETOROLAC TROMETHAMINE 30 MG: 30 INJECTION, SOLUTION INTRAMUSCULAR at 13:06

## 2022-09-03 RX ADMIN — DOCUSATE SODIUM 100 MG: 100 CAPSULE, LIQUID FILLED ORAL at 18:00

## 2022-09-03 RX ADMIN — KETOROLAC TROMETHAMINE 30 MG: 30 INJECTION, SOLUTION INTRAMUSCULAR at 00:07

## 2022-09-03 NOTE — PLAN OF CARE
Problem: PAIN - ADULT  Goal: Verbalizes/displays adequate comfort level or baseline comfort level  Description: Interventions:  - Encourage patient to monitor pain and request assistance  - Assess pain using appropriate pain scale  - Administer analgesics based on type and severity of pain and evaluate response  - Implement non-pharmacological measures as appropriate and evaluate response  - Consider cultural and social influences on pain and pain management  - Notify physician/advanced practitioner if interventions unsuccessful or patient reports new pain  Outcome: Progressing     Problem: INFECTION - ADULT  Goal: Absence or prevention of progression during hospitalization  Description: INTERVENTIONS:  - Assess and monitor for signs and symptoms of infection  - Monitor lab/diagnostic results  - Monitor all insertion sites, i e  indwelling lines, tubes, and drains  - [place] appropriate cooling/warming therapies per order  - Administer medications as ordered  - Instruct and encourage patient and family to use good hand hygiene technique  - Identify and instruct in appropriate isolation precautions for identified infection/condition  Outcome: Progressing  Goal: Absence of fever/infection during neutropenic period  Description: INTERVENTIONS:  - Monitor WBC    Outcome: Progressing     Problem: SAFETY ADULT  Goal: Patient will remain free of falls  Description: INTERVENTIONS:  - Educate patient/family on patient safety including physical limitations  - Instruct patient to call for assistance with activity   - Keep Call bell within reach  - Keep bed low and locked with side rails adjusted as appropriate  - Keep care items and personal belongings within reach  - Initiate and maintain comfort rounds  - Make Fall Risk Sign visible to staff  Outcome: Progressing  Goal: Maintain or return to baseline ADL function  Description: INTERVENTIONS:  -  Assess patient's ability to carry out ADLs; assess patient's baseline for ADL function and identify physical deficits which impact ability to perform ADLs (bathing, care of mouth/teeth, toileting, grooming, dressing, etc )  - Assess/evaluate cause of self-care deficits   - Assess range of motion  - Assess patient's mobility; develop plan if impaired  - Assess patient's need for assistive devices and provide as appropriate  - Encourage maximum independence but intervene and supervise when necessary  - Provide patient education as appropriate  Outcome: Progressing  Goal: Maintains/Returns to pre admission functional level  Description: INTERVENTIONS:  - Perform BMAT or MOVE assessment daily    - Set and communicate daily mobility goal to care team and patient/family/caregiver  - Collaborate with rehabilitation services on mobility goals if consulted  - Out of bed for toileting  - Record patient progress and toleration of activity level   Outcome: Progressing     Problem: DISCHARGE PLANNING  Goal: Discharge to home or other facility with appropriate resources  Description: INTERVENTIONS:  - Identify barriers to discharge w/patient and caregiver  - Arrange for needed discharge resources and transportation as appropriate  - Identify discharge learning needs (meds, wound care, etc )  - Arrange for interpretive services to assist at discharge as needed  - Refer to Case Management Department for coordinating discharge planning if the patient needs post-hospital services based on physician/advanced practitioner order or complex needs related to functional status, cognitive ability, or social support system  Outcome: Progressing     Problem: Knowledge Deficit  Goal: Patient/family/caregiver demonstrates understanding of disease process, treatment plan, medications, and discharge instructions  Description: Complete learning assessment and assess knowledge base    Interventions:  - Provide teaching at level of understanding  - Provide teaching via preferred learning methods  Outcome: Progressing Problem: POSTPARTUM  Goal: Experiences normal postpartum course  Description: INTERVENTIONS:  - Monitor maternal vital signs  - Assess uterine involution and lochia  Outcome: Progressing  Goal: Appropriate maternal -  bonding  Description: INTERVENTIONS:  - Identify family support  - Assess for appropriate maternal/infant bonding   -Encourage maternal/infant bonding opportunities  - Referral to  or  as needed  Outcome: Progressing  Goal: Establishment of infant feeding pattern  Description: INTERVENTIONS:  - Assess breast/bottle feeding  - Refer to lactation as needed  Outcome: Progressing  Goal: Incision(s), wounds(s) or drain site(s) healing without S/S of infection  Description: INTERVENTIONS  - Assess and document dressing, incision, wound bed, drain sites and surrounding tissue  - Provide patient and family education    Outcome: Progressing

## 2022-09-03 NOTE — ASSESSMENT & PLAN NOTE
, Hgb 12 6 --> post op Hgb 11 3  Lines: ventura removed, passed void trial  Pain: Tylenol and toradol scheduled, alyce 52/10 PRN    FEN: Tolerating regular diet  DVT ppx: SCDs  Passing flatus   Incision C/D/I

## 2022-09-03 NOTE — PROGRESS NOTES
Progress Note - OB/GYN  Cesia May 27 y o  female MRN: [id number]  Unit/Bed#: -01 Encounter: [id number]    Assessment and Plan     Hero Osorio is a patient of: Dr Briseyda Valdes  She is PPD# 1 s/p 1LTCS   Recovering well and is stable       Status post primary low transverse  section  Assessment & Plan  , Hgb 12 6 --> post op Hgb 11 3  Lines: ventura removed, passed void trial  Pain: Tylenol and toradol scheduled, alyce 52/10 PRN    FEN: Tolerating regular diet  DVT ppx: SCDs  Passing flatus   Incision C/D/I       H/O cold sores  Assessment & Plan  Hx of HSV ( Cold Sores) : Home dose Valacyclovir ppx      Disposition    - Anticipate discharge home on POD# 2      Subjective/Objective     Chief Complaint: Postpartum State     Subjective:    Cesia May is POD#1 s/p1LTCS   She has no current complaints  Pain is well controlled  Patient is currently voiding  She is ambulating  Patient is currently passing flatus and has had no bowel movement  She is not tolerating PO, and endorses nausea and vomiting overnight that has now resolved after Zofran and decadron  Patient denies fever, chills, chest pain, shortness of breath, or calf tenderness  Lochia is normal  She is  Breastfeeding  She is recovering well and is stable         Vitals:   /62 (BP Location: Right arm)   Pulse 82   Temp 98 4 °F (36 9 °C) (Oral)   Resp 18   Ht 5' 2" (1 575 m)   Wt 75 8 kg (167 lb)   LMP 2021   SpO2 97%   Breastfeeding Yes   BMI 30 54 kg/m²       Intake/Output Summary (Last 24 hours) at 9/3/2022 1143  Last data filed at 9/3/2022 0601  Gross per 24 hour   Intake 892 ml   Output 2350 ml   Net -1458 ml       Invasive Devices  Timeline    Peripheral Intravenous Line  Duration           Peripheral IV 22 Distal;Left;Ventral (anterior) Forearm 1 day                Physical Exam:   GEN: Hero Osorio appears well, alert and oriented x 3, pleasant and cooperative   CARDIO: RRR, no murmurs or rubs  RESP:  CTAB, no wheezes or rales  ABDOMEN: soft, no tenderness, no distention, fundus @ 2 cm below U , Incision C/D/I  EXTREMITIES: SCDs on, non tender, no erythema      Labs:     Hemoglobin   Date Value Ref Range Status   09/03/2022 11 3 (L) 11 5 - 15 4 g/dL Final   09/02/2022 12 6 11 5 - 15 4 g/dL Final     WBC   Date Value Ref Range Status   09/03/2022 21 38 (H) 4 31 - 10 16 Thousand/uL Final   09/02/2022 11 11 (H) 4 31 - 10 16 Thousand/uL Final     Platelets   Date Value Ref Range Status   09/03/2022 286 149 - 390 Thousands/uL Final   09/02/2022 272 149 - 390 Thousands/uL Final     Creatinine   Date Value Ref Range Status   01/29/2021 0 66 0 60 - 1 30 mg/dL Final     Comment:     Standardized to IDMS reference method   11/05/2020 0 60 0 60 - 1 30 mg/dL Final     Comment:     Standardized to IDMS reference method     AST   Date Value Ref Range Status   08/17/2021 16 5 - 45 U/L Final     Comment:     Specimen collection should occur prior to Sulfasalazine administration due to the potential for falsely depressed results  04/26/2021 15 5 - 45 U/L Final     Comment:     Specimen collection should occur prior to Sulfasalazine administration due to the potential for falsely depressed results  ALT   Date Value Ref Range Status   08/17/2021 15 12 - 78 U/L Final     Comment:     Specimen collection should occur prior to Sulfasalazine administration due to the potential for falsely depressed results  04/26/2021 18 12 - 78 U/L Final     Comment:     Specimen collection should occur prior to Sulfasalazine administration due to the potential for falsely depressed results             Virginia Stokes MD  9/3/2022  11:43 AM

## 2022-09-03 NOTE — PLAN OF CARE
Problem: PAIN - ADULT  Goal: Verbalizes/displays adequate comfort level or baseline comfort level  Description: Interventions:  - Encourage patient to monitor pain and request assistance  - Assess pain using appropriate pain scale  - Administer analgesics based on type and severity of pain and evaluate response  - Implement non-pharmacological measures as appropriate and evaluate response  - Consider cultural and social influences on pain and pain management  - Notify physician/advanced practitioner if interventions unsuccessful or patient reports new pain  Outcome: Progressing     Problem: INFECTION - ADULT  Goal: Absence or prevention of progression during hospitalization  Description: INTERVENTIONS:  - Assess and monitor for signs and symptoms of infection  - Monitor lab/diagnostic results  - Monitor all insertion sites, i e  indwelling lines, tubes, and drains  - [place] appropriate cooling/warming therapies per order  - Administer medications as ordered  - Instruct and encourage patient and family to use good hand hygiene technique  - Identify and instruct in appropriate isolation precautions for identified infection/condition  Outcome: Progressing  Goal: Absence of fever/infection during neutropenic period  Description: INTERVENTIONS:  - Monitor WBC    Outcome: Progressing     Problem: SAFETY ADULT  Goal: Patient will remain free of falls  Description: INTERVENTIONS:  - Educate patient/family on patient safety including physical limitations  - Instruct patient to call for assistance with activity   - Keep Call bell within reach  - Keep bed low and locked with side rails adjusted as appropriate  - Keep care items and personal belongings within reach  - Initiate and maintain comfort rounds  - Make Fall Risk Sign visible to staff  Outcome: Progressing  Goal: Maintain or return to baseline ADL function  Description: INTERVENTIONS:  -  Assess patient's ability to carry out ADLs; assess patient's baseline for ADL function and identify physical deficits which impact ability to perform ADLs (bathing, care of mouth/teeth, toileting, grooming, dressing, etc )  - Assess/evaluate cause of self-care deficits   - Assess range of motion  - Assess patient's mobility; develop plan if impaired  - Assess patient's need for assistive devices and provide as appropriate  - Encourage maximum independence but intervene and supervise when necessary  - Provide patient education as appropriate  Outcome: Progressing  Goal: Maintains/Returns to pre admission functional level  Description: INTERVENTIONS:  - Perform BMAT or MOVE assessment daily    - Set and communicate daily mobility goal to care team and patient/family/caregiver  - Collaborate with rehabilitation services on mobility goals if consulted  - Out of bed for toileting  - Record patient progress and toleration of activity level   Outcome: Progressing     Problem: Knowledge Deficit  Goal: Patient/family/caregiver demonstrates understanding of disease process, treatment plan, medications, and discharge instructions  Description: Complete learning assessment and assess knowledge base    Interventions:  - Provide teaching at level of understanding  - Provide teaching via preferred learning methods  Outcome: Progressing     Problem: POSTPARTUM  Goal: Experiences normal postpartum course  Description: INTERVENTIONS:  - Monitor maternal vital signs  - Assess uterine involution and lochia  Outcome: Progressing  Goal: Appropriate maternal -  bonding  Description: INTERVENTIONS:  - Identify family support  - Assess for appropriate maternal/infant bonding   -Encourage maternal/infant bonding opportunities  - Referral to  or  as needed  Outcome: Progressing  Goal: Establishment of infant feeding pattern  Description: INTERVENTIONS:  - Assess breast/bottle feeding  - Refer to lactation as needed  Outcome: Progressing  Goal: Incision(s), wounds(s) or drain site(s) healing without S/S of infection  Description: INTERVENTIONS  - Assess and document dressing, incision, wound bed, drain sites and surrounding tissue  - Provide patient and family education    Outcome: Progressing

## 2022-09-04 VITALS
BODY MASS INDEX: 30.73 KG/M2 | SYSTOLIC BLOOD PRESSURE: 130 MMHG | HEIGHT: 62 IN | OXYGEN SATURATION: 98 % | TEMPERATURE: 98.7 F | HEART RATE: 95 BPM | DIASTOLIC BLOOD PRESSURE: 62 MMHG | WEIGHT: 167 LBS | RESPIRATION RATE: 18 BRPM

## 2022-09-04 PROCEDURE — 99024 POSTOP FOLLOW-UP VISIT: CPT | Performed by: OBSTETRICS & GYNECOLOGY

## 2022-09-04 RX ORDER — OXYCODONE HYDROCHLORIDE 5 MG/1
5 TABLET ORAL EVERY 4 HOURS PRN
Qty: 10 TABLET | Refills: 0 | Status: SHIPPED | OUTPATIENT
Start: 2022-09-04

## 2022-09-04 RX ORDER — ACETAMINOPHEN 325 MG/1
650 TABLET ORAL EVERY 6 HOURS
Refills: 0
Start: 2022-09-04

## 2022-09-04 RX ORDER — DOCUSATE SODIUM 100 MG/1
100 CAPSULE, LIQUID FILLED ORAL 2 TIMES DAILY
Qty: 30 CAPSULE | Refills: 0 | Status: SHIPPED | OUTPATIENT
Start: 2022-09-04

## 2022-09-04 RX ORDER — IBUPROFEN 600 MG/1
600 TABLET ORAL EVERY 6 HOURS
Qty: 30 TABLET | Refills: 0 | Status: SHIPPED | OUTPATIENT
Start: 2022-09-04

## 2022-09-04 RX ADMIN — ACETAMINOPHEN 650 MG: 325 TABLET ORAL at 13:11

## 2022-09-04 RX ADMIN — ACETAMINOPHEN 650 MG: 325 TABLET ORAL at 06:09

## 2022-09-04 RX ADMIN — IBUPROFEN 600 MG: 600 TABLET ORAL at 10:41

## 2022-09-04 RX ADMIN — IBUPROFEN 600 MG: 600 TABLET ORAL at 03:32

## 2022-09-04 RX ADMIN — DOCUSATE SODIUM 100 MG: 100 CAPSULE, LIQUID FILLED ORAL at 10:40

## 2022-09-04 NOTE — PROGRESS NOTES
Progress Note - OB/GYN  Cesia Mendoza 27 y o  female MRN: [id number]  Unit/Bed#: -01 Encounter: [id number]    Assessment and Plan     Jdoi Bobo is a patient of: Dr Abner Moreno  She is POD# 2 s/p 1LTCS   Recovering well and is stable       Status post primary low transverse  section  Assessment & Plan  , Hgb 12 6 --> post op Hgb 11 3  Lines: ventura removed, passed void trial  Pain: Tylenol and toradol scheduled, alyce 52/10 PRN    FEN: Tolerating regular diet  DVT ppx: SCDs  Passing flatus   Incision C/D/I       H/O cold sores  Assessment & Plan  Hx of HSV ( Cold Sores) : Home dose Valacyclovir ppx        Disposition    - Anticipate discharge home on POD# 2      Subjective/Objective     Chief Complaint: Postpartum State     Subjective:    POD#2 s/p 1LTCS   She has no current complaints  Pain is well controlled  Patient is currently voiding  She is ambulating  Patient is currently passing flatus and has had no bowel movement  She is tolerating PO, and denies nausea or vomitting  Patient denies fever, chills, chest pain, shortness of breath, or calf tenderness  Lochia is normal  She is  Breastfeeding  She is recovering well and is stable         Vitals:   /62 (BP Location: Right arm)   Pulse 95   Temp 98 7 °F (37 1 °C) (Oral)   Resp 18   Ht 5' 2" (1 575 m)   Wt 75 8 kg (167 lb)   LMP 2021   SpO2 98%   Breastfeeding Yes   BMI 30 54 kg/m²     No intake or output data in the 24 hours ending 22 0918    Invasive Devices  Timeline    None                 Physical Exam:   GEN: Cesia Mendoza appears well, alert and oriented x 3, pleasant and cooperative   CARDIO: RRR, no murmurs or rubs  RESP:  CTAB, no wheezes or rales  ABDOMEN: soft, no tenderness, no distention, fundus @ 2 cm below U , Incision C/D/I  EXTREMITIES: SCDs on, non tender, no erythema      Labs:     Hemoglobin   Date Value Ref Range Status   2022 11 3 (L) 11 5 - 15 4 g/dL Final   2022 12 6 11 5 - 15 4 g/dL Final     WBC   Date Value Ref Range Status   09/03/2022 21 38 (H) 4 31 - 10 16 Thousand/uL Final   09/02/2022 11 11 (H) 4 31 - 10 16 Thousand/uL Final     Platelets   Date Value Ref Range Status   09/03/2022 286 149 - 390 Thousands/uL Final   09/02/2022 272 149 - 390 Thousands/uL Final     Creatinine   Date Value Ref Range Status   01/29/2021 0 66 0 60 - 1 30 mg/dL Final     Comment:     Standardized to IDMS reference method   11/05/2020 0 60 0 60 - 1 30 mg/dL Final     Comment:     Standardized to IDMS reference method     AST   Date Value Ref Range Status   08/17/2021 16 5 - 45 U/L Final     Comment:     Specimen collection should occur prior to Sulfasalazine administration due to the potential for falsely depressed results  04/26/2021 15 5 - 45 U/L Final     Comment:     Specimen collection should occur prior to Sulfasalazine administration due to the potential for falsely depressed results  ALT   Date Value Ref Range Status   08/17/2021 15 12 - 78 U/L Final     Comment:     Specimen collection should occur prior to Sulfasalazine administration due to the potential for falsely depressed results  04/26/2021 18 12 - 78 U/L Final     Comment:     Specimen collection should occur prior to Sulfasalazine administration due to the potential for falsely depressed results             Soy Lincoln MD  9/4/2022  9:18 AM

## 2022-09-04 NOTE — PLAN OF CARE
Problem: PAIN - ADULT  Goal: Verbalizes/displays adequate comfort level or baseline comfort level  Description: Interventions:  - Encourage patient to monitor pain and request assistance  - Assess pain using appropriate pain scale  - Administer analgesics based on type and severity of pain and evaluate response  - Implement non-pharmacological measures as appropriate and evaluate response  - Consider cultural and social influences on pain and pain management  - Notify physician/advanced practitioner if interventions unsuccessful or patient reports new pain  Outcome: Progressing     Problem: INFECTION - ADULT  Goal: Absence or prevention of progression during hospitalization  Description: INTERVENTIONS:  - Assess and monitor for signs and symptoms of infection  - Monitor lab/diagnostic results  - Monitor all insertion sites, i e  indwelling lines, tubes, and drains  - [place] appropriate cooling/warming therapies per order  - Administer medications as ordered  - Instruct and encourage patient and family to use good hand hygiene technique  - Identify and instruct in appropriate isolation precautions for identified infection/condition  Outcome: Progressing  Goal: Absence of fever/infection during neutropenic period  Description: INTERVENTIONS:  - Monitor WBC    Outcome: Progressing     Problem: SAFETY ADULT  Goal: Patient will remain free of falls  Description: INTERVENTIONS:  - Educate patient/family on patient safety including physical limitations  - Instruct patient to call for assistance with activity   - Keep Call bell within reach  - Keep bed low and locked with side rails adjusted as appropriate  - Keep care items and personal belongings within reach  - Initiate and maintain comfort rounds  - Make Fall Risk Sign visible to staff  Outcome: Progressing  Goal: Maintain or return to baseline ADL function  Description: INTERVENTIONS:  -  Assess patient's ability to carry out ADLs; assess patient's baseline for ADL function and identify physical deficits which impact ability to perform ADLs (bathing, care of mouth/teeth, toileting, grooming, dressing, etc )  - Assess/evaluate cause of self-care deficits   - Assess range of motion  - Assess patient's mobility; develop plan if impaired  - Assess patient's need for assistive devices and provide as appropriate  - Encourage maximum independence but intervene and supervise when necessary  - Provide patient education as appropriate  Outcome: Progressing  Goal: Maintains/Returns to pre admission functional level  Description: INTERVENTIONS:  - Perform BMAT or MOVE assessment daily    - Set and communicate daily mobility goal to care team and patient/family/caregiver  - Collaborate with rehabilitation services on mobility goals if consulted  - Out of bed for toileting  - Record patient progress and toleration of activity level   Outcome: Progressing     Problem: DISCHARGE PLANNING  Goal: Discharge to home or other facility with appropriate resources  Description: INTERVENTIONS:  - Identify barriers to discharge w/patient and caregiver  - Arrange for needed discharge resources and transportation as appropriate  - Identify discharge learning needs (meds, wound care, etc )  - Arrange for interpretive services to assist at discharge as needed  - Refer to Case Management Department for coordinating discharge planning if the patient needs post-hospital services based on physician/advanced practitioner order or complex needs related to functional status, cognitive ability, or social support system  Outcome: Progressing     Problem: Knowledge Deficit  Goal: Patient/family/caregiver demonstrates understanding of disease process, treatment plan, medications, and discharge instructions  Description: Complete learning assessment and assess knowledge base    Interventions:  - Provide teaching at level of understanding  - Provide teaching via preferred learning methods  Outcome: Progressing Problem: POSTPARTUM  Goal: Experiences normal postpartum course  Description: INTERVENTIONS:  - Monitor maternal vital signs  - Assess uterine involution and lochia  Outcome: Progressing  Goal: Appropriate maternal -  bonding  Description: INTERVENTIONS:  - Identify family support  - Assess for appropriate maternal/infant bonding   -Encourage maternal/infant bonding opportunities  - Referral to  or  as needed  Outcome: Progressing  Goal: Establishment of infant feeding pattern  Description: INTERVENTIONS:  - Assess breast/bottle feeding  - Refer to lactation as needed  Outcome: Progressing  Goal: Incision(s), wounds(s) or drain site(s) healing without S/S of infection  Description: INTERVENTIONS  - Assess and document dressing, incision, wound bed, drain sites and surrounding tissue  - Provide patient and family education    Outcome: Progressing

## 2022-09-04 NOTE — LACTATION NOTE
This note was copied from a baby's chart  Melina Petersen is scheduled for discharge to home today  She is doing better with latching her baby to the breast, but feeds at the breast continue to be short  Melina Petersen has been supplementing with formula/bottle  Stressed importance of pumping after every feed to protect/establish her milk supply  Feeding Plan:  1) introduce breast first for every feeding  2) to feed infant expressed breast milk after breast  3)  feed infant formula as needed until milk supply is established (you may do step 2 & 3 with paced bottle feeding)  4)  to pump after every feeding at the breast     Went over the discharge breastfeeding instructions including the feeding log  Emphasized 8 or more (12) feedings in a 24 hour period and what to expect for the number of diapers per day of life  Discussed s/s engorgement, blocked milk ducts, and mastitis  Discussed how to remedy at home and when to contact physician  Provided breastfeeding and your lifestyle, storage and preparation of breast milk, how to keep you breast pump clean, the 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

## 2022-09-04 NOTE — PLAN OF CARE
Problem: PAIN - ADULT  Goal: Verbalizes/displays adequate comfort level or baseline comfort level  Description: Interventions:  - Encourage patient to monitor pain and request assistance  - Assess pain using appropriate pain scale  - Administer analgesics based on type and severity of pain and evaluate response  - Implement non-pharmacological measures as appropriate and evaluate response  - Consider cultural and social influences on pain and pain management  - Notify physician/advanced practitioner if interventions unsuccessful or patient reports new pain  9/4/2022 1534 by Marylen Pound, RN  Outcome: Completed  9/4/2022 1245 by Marylen Pound, RN  Outcome: Progressing     Problem: INFECTION - ADULT  Goal: Absence or prevention of progression during hospitalization  Description: INTERVENTIONS:  - Assess and monitor for signs and symptoms of infection  - Monitor lab/diagnostic results  - Monitor all insertion sites, i e  indwelling lines, tubes, and drains  - [place] appropriate cooling/warming therapies per order  - Administer medications as ordered  - Instruct and encourage patient and family to use good hand hygiene technique  - Identify and instruct in appropriate isolation precautions for identified infection/condition  9/4/2022 1534 by Marylen Pound, RN  Outcome: Completed  9/4/2022 1245 by Marylen Pound, RN  Outcome: Progressing  Goal: Absence of fever/infection during neutropenic period  Description: INTERVENTIONS:  - Monitor WBC    9/4/2022 1534 by Marylen Pound, RN  Outcome: Completed  9/4/2022 1245 by Marylen Pound, RN  Outcome: Progressing     Problem: SAFETY ADULT  Goal: Patient will remain free of falls  Description: INTERVENTIONS:  - Educate patient/family on patient safety including physical limitations  - Instruct patient to call for assistance with activity   - Keep Call bell within reach  - Keep bed low and locked with side rails adjusted as appropriate  - Keep care items and personal belongings within reach  - Initiate and maintain comfort rounds  - Make Fall Risk Sign visible to staff  9/4/2022 1534 by Cody Fiore RN  Outcome: Completed  9/4/2022 1245 by Cody Fiore RN  Outcome: Progressing  Goal: Maintain or return to baseline ADL function  Description: INTERVENTIONS:  -  Assess patient's ability to carry out ADLs; assess patient's baseline for ADL function and identify physical deficits which impact ability to perform ADLs (bathing, care of mouth/teeth, toileting, grooming, dressing, etc )  - Assess/evaluate cause of self-care deficits   - Assess range of motion  - Assess patient's mobility; develop plan if impaired  - Assess patient's need for assistive devices and provide as appropriate  - Encourage maximum independence but intervene and supervise when necessary  - Provide patient education as appropriate  9/4/2022 1534 by Cody Fiore RN  Outcome: Completed  9/4/2022 1245 by Cody Fiore RN  Outcome: Progressing  Goal: Maintains/Returns to pre admission functional level  Description: INTERVENTIONS:  - Perform BMAT or MOVE assessment daily    - Set and communicate daily mobility goal to care team and patient/family/caregiver     - Collaborate with rehabilitation services on mobility goals if consulted  - Out of bed for toileting  - Record patient progress and toleration of activity level   9/4/2022 1534 by Cody Fiore RN  Outcome: Completed  9/4/2022 1245 by Cody Fiore RN  Outcome: Progressing     Problem: DISCHARGE PLANNING  Goal: Discharge to home or other facility with appropriate resources  Description: INTERVENTIONS:  - Identify barriers to discharge w/patient and caregiver  - Arrange for needed discharge resources and transportation as appropriate  - Identify discharge learning needs (meds, wound care, etc )  - Arrange for interpretive services to assist at discharge as needed  - Refer to Case Management Department for coordinating discharge planning if the patient needs post-hospital services based on physician/advanced practitioner order or complex needs related to functional status, cognitive ability, or social support system  2022 1534 by Rosalina Fabian RN  Outcome: Completed  2022 124 by Rosalina Fabian RN  Outcome: Progressing     Problem: Knowledge Deficit  Goal: Patient/family/caregiver demonstrates understanding of disease process, treatment plan, medications, and discharge instructions  Description: Complete learning assessment and assess knowledge base    Interventions:  - Provide teaching at level of understanding  - Provide teaching via preferred learning methods  2022 1534 by Rosalina Fabian RN  Outcome: Completed  2022 1245 by Rosalina Fabian RN  Outcome: Progressing     Problem: POSTPARTUM  Goal: Experiences normal postpartum course  Description: INTERVENTIONS:  - Monitor maternal vital signs  - Assess uterine involution and lochia  2022 1534 by Rosalina Fabian RN  Outcome: Completed  2022 1245 by Rosalina Fabian RN  Outcome: Progressing  Goal: Appropriate maternal -  bonding  Description: INTERVENTIONS:  - Identify family support  - Assess for appropriate maternal/infant bonding   -Encourage maternal/infant bonding opportunities  - Referral to  or  as needed  2022 1534 by Rosalina Fabian RN  Outcome: Completed  2022 124 by Rosalina Fabian RN  Outcome: Progressing  Goal: Establishment of infant feeding pattern  Description: INTERVENTIONS:  - Assess breast/bottle feeding  - Refer to lactation as needed  2022 1534 by Rosalina Fabian RN  Outcome: Completed  2022 124 by Rosalina Fabian RN  Outcome: Progressing  Goal: Incision(s), wounds(s) or drain site(s) healing without S/S of infection  Description: INTERVENTIONS  - Assess and document dressing, incision, wound bed, drain sites and surrounding tissue  - Provide patient and family education    9/4/2022 1534 by Sukumar Diaz RN  Outcome: Completed  9/4/2022 1245 by Sukumar Diaz RN  Outcome: Progressing

## 2022-09-04 NOTE — PLAN OF CARE
Problem: PAIN - ADULT  Goal: Verbalizes/displays adequate comfort level or baseline comfort level  Description: Interventions:  - Encourage patient to monitor pain and request assistance  - Assess pain using appropriate pain scale  - Administer analgesics based on type and severity of pain and evaluate response  - Implement non-pharmacological measures as appropriate and evaluate response  - Consider cultural and social influences on pain and pain management  - Notify physician/advanced practitioner if interventions unsuccessful or patient reports new pain  Outcome: Progressing     Problem: INFECTION - ADULT  Goal: Absence or prevention of progression during hospitalization  Description: INTERVENTIONS:  - Assess and monitor for signs and symptoms of infection  - Monitor lab/diagnostic results  - Monitor all insertion sites, i e  indwelling lines, tubes, and drains  - [place] appropriate cooling/warming therapies per order  - Administer medications as ordered  - Instruct and encourage patient and family to use good hand hygiene technique  - Identify and instruct in appropriate isolation precautions for identified infection/condition  Outcome: Progressing  Goal: Absence of fever/infection during neutropenic period  Description: INTERVENTIONS:  - Monitor WBC    Outcome: Progressing     Problem: SAFETY ADULT  Goal: Patient will remain free of falls  Description: INTERVENTIONS:  - Educate patient/family on patient safety including physical limitations  - Instruct patient to call for assistance with activity   - Keep Call bell within reach  - Keep bed low and locked with side rails adjusted as appropriate  - Keep care items and personal belongings within reach  - Initiate and maintain comfort rounds  - Make Fall Risk Sign visible to staff  Outcome: Progressing  Goal: Maintain or return to baseline ADL function  Description: INTERVENTIONS:  -  Assess patient's ability to carry out ADLs; assess patient's baseline for ADL function and identify physical deficits which impact ability to perform ADLs (bathing, care of mouth/teeth, toileting, grooming, dressing, etc )  - Assess/evaluate cause of self-care deficits   - Assess range of motion  - Assess patient's mobility; develop plan if impaired  - Assess patient's need for assistive devices and provide as appropriate  - Encourage maximum independence but intervene and supervise when necessary  - Provide patient education as appropriate  Outcome: Progressing  Goal: Maintains/Returns to pre admission functional level  Description: INTERVENTIONS:  - Perform BMAT or MOVE assessment daily    - Set and communicate daily mobility goal to care team and patient/family/caregiver  - Collaborate with rehabilitation services on mobility goals if consulted  - Out of bed for toileting  - Record patient progress and toleration of activity level   Outcome: Progressing     Problem: DISCHARGE PLANNING  Goal: Discharge to home or other facility with appropriate resources  Description: INTERVENTIONS:  - Identify barriers to discharge w/patient and caregiver  - Arrange for needed discharge resources and transportation as appropriate  - Identify discharge learning needs (meds, wound care, etc )  - Arrange for interpretive services to assist at discharge as needed  - Refer to Case Management Department for coordinating discharge planning if the patient needs post-hospital services based on physician/advanced practitioner order or complex needs related to functional status, cognitive ability, or social support system  Outcome: Progressing     Problem: Knowledge Deficit  Goal: Patient/family/caregiver demonstrates understanding of disease process, treatment plan, medications, and discharge instructions  Description: Complete learning assessment and assess knowledge base    Interventions:  - Provide teaching at level of understanding  - Provide teaching via preferred learning methods  Outcome: Progressing Problem: POSTPARTUM  Goal: Experiences normal postpartum course  Description: INTERVENTIONS:  - Monitor maternal vital signs  - Assess uterine involution and lochia  Outcome: Progressing  Goal: Appropriate maternal -  bonding  Description: INTERVENTIONS:  - Identify family support  - Assess for appropriate maternal/infant bonding   -Encourage maternal/infant bonding opportunities  - Referral to  or  as needed  Outcome: Progressing  Goal: Establishment of infant feeding pattern  Description: INTERVENTIONS:  - Assess breast/bottle feeding  - Refer to lactation as needed  Outcome: Progressing  Goal: Incision(s), wounds(s) or drain site(s) healing without S/S of infection  Description: INTERVENTIONS  - Assess and document dressing, incision, wound bed, drain sites and surrounding tissue  - Provide patient and family education    Outcome: Progressing

## 2022-09-21 ENCOUNTER — POSTPARTUM VISIT (OUTPATIENT)
Dept: OBGYN CLINIC | Facility: CLINIC | Age: 32
End: 2022-09-21

## 2022-09-21 VITALS
DIASTOLIC BLOOD PRESSURE: 82 MMHG | SYSTOLIC BLOOD PRESSURE: 132 MMHG | HEIGHT: 62 IN | WEIGHT: 151 LBS | BODY MASS INDEX: 27.79 KG/M2

## 2022-09-21 PROCEDURE — PNV: Performed by: OBSTETRICS & GYNECOLOGY

## 2022-09-22 NOTE — PROGRESS NOTES
Amy Callaway All Smith is a 27 y o  female who presents for a postpartum visit  She is 3 weeks postpartum following a low cervical transverse  section  I have fully reviewed the prenatal and intrapartum course  The delivery was at 44 gestational weeks  Outcome: repeat  section, low transverse incision  Anesthesia: spinal  Postpartum course has been stable  Baby's course has been stable  Baby is feeding by breast and bottle  Bleeding no bleeding  Bowel function is normal  Bladder function is normal  Patient is sexually active  Contraception method is condoms  Postpartum depression screening: negative  The following portions of the patient's history were reviewed and updated as appropriate: allergies, current medications, past family history, past medical history, past social history, past surgical history and problem list     Review of Systems  Pertinent items are noted in HPI       Objective     /82 (BP Location: Left arm, Patient Position: Sitting, Cuff Size: Adult)   Ht 5' 2" (1 575 m)   Wt 68 5 kg (151 lb)   LMP 2021   BMI 27 62 kg/m²    General:  alert and oriented, in no acute distress    Breasts:  negative   Lungs:    Heart:     Abdomen: soft, non-tender; bowel sounds normal; no masses,  no organomegaly and Incision clean/dry/intact    Vulva:    Vagina:    Cervix:     Corpus:    Adnexa:     Rectal Exam:      Assessment/Plan   22-year-old female  Postpartum  Status post primary  maternal request   Breast and bottle feeding  Plan   Continue breast feeding   Continue prenatal vitamin daily   Contraception discussed desired natural planning for contraception   Return to office in 3 months for annual exam

## 2022-09-28 ENCOUNTER — TELEPHONE (OUTPATIENT)
Dept: OBGYN CLINIC | Facility: CLINIC | Age: 32
End: 2022-09-28

## 2023-06-13 NOTE — TELEPHONE ENCOUNTER
Pt requesting refill on Zofran Plan: Briefly discussed accutane. Patient unable to get pregnant Continue Regimen: Epiduo Forte 0.3 %-2.5 % topical gel with pump \\nDays Supply: 30\\nSig: apply pea sized amount to acne at bedtime\\n\\nspironolactone 100 mg tablet \\nSig: Take 1 po qd Samples Given: Cera Ve pm Detail Level: Zone none Initiate Treatment: HylatopicPlus lotion \\nDays Supply: 30\\nSig: Apply to affected areas 2-3 times daily as needed for dryness

## 2023-07-29 ENCOUNTER — APPOINTMENT (OUTPATIENT)
Dept: LAB | Facility: CLINIC | Age: 33
End: 2023-07-29

## 2023-07-29 DIAGNOSIS — Z00.8 HEALTH EXAMINATION IN POPULATION SURVEY: ICD-10-CM

## 2023-07-29 LAB
CHOLEST SERPL-MCNC: 194 MG/DL
EST. AVERAGE GLUCOSE BLD GHB EST-MCNC: 117 MG/DL
HBA1C MFR BLD: 5.7 %
HDLC SERPL-MCNC: 77 MG/DL
LDLC SERPL CALC-MCNC: 96 MG/DL (ref 0–100)
NONHDLC SERPL-MCNC: 117 MG/DL
TRIGL SERPL-MCNC: 107 MG/DL

## 2023-07-29 PROCEDURE — 80061 LIPID PANEL: CPT

## 2023-07-29 PROCEDURE — 83036 HEMOGLOBIN GLYCOSYLATED A1C: CPT

## 2023-07-29 PROCEDURE — 36415 COLL VENOUS BLD VENIPUNCTURE: CPT

## 2023-11-09 ENCOUNTER — TELEPHONE (OUTPATIENT)
Dept: PSYCHIATRY | Facility: CLINIC | Age: 33
End: 2023-11-09

## 2023-11-09 NOTE — TELEPHONE ENCOUNTER
A message was left for the pt to return call. Pt will be scheduled with [facility] at that time.

## 2023-11-10 ENCOUNTER — TELEPHONE (OUTPATIENT)
Dept: PSYCHIATRY | Facility: CLINIC | Age: 33
End: 2023-11-10

## 2023-11-10 NOTE — TELEPHONE ENCOUNTER
Patient was returning call in regards to scheduling, writer transferred caller to intake for assistance

## 2023-11-10 NOTE — TELEPHONE ENCOUNTER
Behavioral Health Outpatient Intake Questions    Referred By   : DR. Gabbie Zabala    Please advise interviewee that they need to answer all questions truthfully to allow for best care, and any misrepresentations of information may affect their ability to be seen at this clinic   => Was this discussed? Yes     If Minor Child (under age 25)    Who is/are the legal guardian(s) of the child? Is there a custody agreement? No     If "YES"- Custody orders must be obtained prior to scheduling the first appointment  In addition, Consent to Treatment must be signed by all legal guardians prior to scheduling the first appointment    If "NO"- Consent to Treatment must be signed by all legal guardians prior to scheduling the first appointment    Behavioral Health Outpatient Intake History -     Presenting Problem (in patient's own words): anxiety, has started to become very anxious and it is starting to effect her work and home life. Are there any communication barriers for this patient? No                                               If yes, please describe barriers:     If there is a unique situation, please refer to [address] for final determination. Are you taking any psychiatric medications? No     If "YES" -What are they     If "YES" -Who prescribes? Has the Patient previously received outpatient Talk Therapy or Medication Management from [facility]  No        If "YES"- When, Where and with Whom? If "NO" -Has Patient received these services elsewhere? If "YES" -When, Where, and with Whom? Has the Patient abused alcohol or other substances in the last 6 months ? No  No concerns of substance abuse are reported. If "YES" -What substance, How much, How often? If illegal substance: Refer to [facility] (for DIANE) or [facility].    If Alcohol in excess of 10 drinks per week:  Refer to [facility] [facility] (for DIANE) or [facility] History-     Is this treatment court ordered? No   If "yes "send to : Talk Therapy : Send to [address] [place] [address] for final determination   Med Management: Send to Dr Colten Calderon for final determination     Has the Patient been convicted of a felony? No   If "Yes" send to -When, What? Talk Therapy : Send to [address] [place] [address] for final determination   Med Management: Send to Dr Colten Calderon for final determination     ACCEPTED as a patient Yes  If "Yes" Appointment Date: 12/11/23 @ 12 pm     Referred Elsewhere? No  If “Yes” - (Where? Ex: [facility], [address] [place] [address], etc.)       Name of [facility]#[id number]  Insurance Phone #  If ins is primary or secondary? If patient is a minor, parents information such as Name, D. O.B of guarantor.

## 2023-11-13 ENCOUNTER — TELEPHONE (OUTPATIENT)
Dept: PSYCHIATRY | Facility: CLINIC | Age: 33
End: 2023-11-13

## 2023-11-13 NOTE — TELEPHONE ENCOUNTER
Pt called to get her appt for 12/11/23 at 12:00 pm switched to a virtual visit due to patient convince, hard to come in-person due to taking care of baby at home. Pt has [place] [facility] and will connect with provider through [facility]. Writer switched appt and checked it in.

## 2023-11-15 ENCOUNTER — TELEMEDICINE (OUTPATIENT)
Dept: FAMILY MEDICINE CLINIC | Facility: CLINIC | Age: 33
End: 2023-11-15
Payer: COMMERCIAL

## 2023-11-15 ENCOUNTER — TELEPHONE (OUTPATIENT)
Dept: FAMILY MEDICINE CLINIC | Facility: CLINIC | Age: 33
End: 2023-11-15

## 2023-11-15 VITALS — HEIGHT: 63 IN | WEIGHT: 135 LBS | BODY MASS INDEX: 23.92 KG/M2

## 2023-11-15 DIAGNOSIS — F41.1 GENERALIZED ANXIETY DISORDER: Primary | ICD-10-CM

## 2023-11-15 PROBLEM — R03.0 ELEVATED BLOOD PRESSURE READING IN OFFICE WITHOUT DIAGNOSIS OF HYPERTENSION: Status: RESOLVED | Noted: 2022-07-15 | Resolved: 2023-11-15

## 2023-11-15 PROBLEM — Z3A.39 39 WEEKS GESTATION OF PREGNANCY: Status: RESOLVED | Noted: 2022-07-15 | Resolved: 2023-11-15

## 2023-11-15 PROBLEM — O99.810 ABNORMAL GLUCOSE TOLERANCE IN PREGNANCY: Status: RESOLVED | Noted: 2022-07-15 | Resolved: 2023-11-15

## 2023-11-15 PROBLEM — Z98.891 STATUS POST PRIMARY LOW TRANSVERSE CESAREAN SECTION: Status: RESOLVED | Noted: 2022-09-02 | Resolved: 2023-11-15

## 2023-11-15 PROCEDURE — 99213 OFFICE O/P EST LOW 20 MIN: CPT | Performed by: NURSE PRACTITIONER

## 2023-11-15 RX ORDER — CHOLECALCIFEROL (VITAMIN D3) 25 MCG
TABLET,CHEWABLE ORAL
COMMUNITY

## 2023-11-15 RX ORDER — AMOXICILLIN 500 MG
CAPSULE ORAL
COMMUNITY

## 2023-11-15 RX ORDER — BUTYROSPERMUM PARKII(SHEA BUTTER), SIMMONDSIA CHINENSIS (JOJOBA) SEED OIL, ALOE BARBADENSIS LEAF EXTRACT .01; 1; 3.5 G/100G; G/100G; G/100G
LIQUID TOPICAL
COMMUNITY

## 2023-11-15 RX ORDER — ESCITALOPRAM OXALATE 5 MG/1
5 TABLET ORAL DAILY
Qty: 30 TABLET | Refills: 1 | Status: SHIPPED | OUTPATIENT
Start: 2023-11-15

## 2023-11-15 NOTE — TELEPHONE ENCOUNTER
Left voicemail to schedule a 3 week follow up for patient.  She was seen by Deb Denton and can be scheduled with her again, either in office or virtual.

## 2023-11-15 NOTE — PROGRESS NOTES
Virtual Regular Visit    Verification of patient location:    Patient is located at Home in the following state in which I hold an active license [place]      Assessment/Plan:    Problem List Items Addressed This Visit          Other    Generalized anxiety disorder - Primary    Relevant Medications    escitalopram (LEXAPRO) 5 mg tablet    Other Relevant Orders    Comprehensive metabolic panel    CBC and differential    TSH, 3rd generation with Free T4 reflex     Patient reports anxiety since college. Patient reports increased anxiety for the past few months. Patient reports that she always feels on edge and stressed. Denies any depression or suicidal thoughts. Discussion on anxiety and treatment. Lexapro 5mg daily prescribed. Medication information and side effects reviewed. Lab work ordered. Patient instructed to follow-up in 3 weeks or sooner prn. Depression Screening and Follow-up Plan: Patient was screened for depression during today's encounter. They screened negative with a PHQ-2 score of 0. Reason for visit is   Chief Complaint   Patient presents with    Virtual Regular Visit    Virtual Regular Visit          Encounter provider DONTE Arceo    Provider located at [address] [place] [id number]      Recent Visits  No visits were found meeting these conditions. Showing recent visits within past 7 days and meeting all other requirements  Today's Visits  Date Type Provider Dept   11/15/23 Telemedicine DONTE Arceo [facility]   Showing today's visits and meeting all other requirements  Future Appointments  No visits were found meeting these conditions. Showing future appointments within next 150 days and meeting all other requirements       The patient was identified by name and date of birth.  Cesia Krysten Pimentel was informed that this is a telemedicine visit and that the visit is being conducted through the [facility] 22 Now platform. She agrees to proceed. .  My office door was closed. No one else was in the room. She acknowledged consent and understanding of privacy and security of the video platform. The patient has agreed to participate and understands they can discontinue the visit at any time. Patient is aware this is a billable service. Amy López is a 26 y.o. female  . Patient reports increased anxiety for the past few months. Patient reports anxiety since college. Patient reports that she feels stressed. Denies any depression. Denies any suicidal thoughts or homicidal thoughts. Patient reports that she always feels on edge. Past Medical History:   Diagnosis Date    39 weeks gestation of pregnancy 07/15/2022    Abnormal glucose tolerance in pregnancy 07/15/2022    Acne     Anemia     Elevated blood pressure reading in office without diagnosis of hypertension 07/15/2022    Initial /88 with manual repeat of 122/66 . Denies HA, visual changes or pain. No prior elevated readings. Has BP cuff at home and will monitor and report any elevated readings 140/90 or above to OB. Migraine     Status post primary low transverse  section 2022    Varicella        Past Surgical History:   Procedure Laterality Date    NO PAST SURGERIES      [place]  DELIVERY ONLY N/A 2022    Procedure:  SECTION ();   Surgeon: Trent Harris MD;  Location: [facility] ;  Service: Obstetrics    WISDOM TOOTH EXTRACTION         Current Outpatient Medications   Medication Sig Dispense Refill    acetaminophen (TYLENOL) 325 mg tablet Take 2 tablets (650 mg total) by mouth every 6 (six) hours  0    Cholecalciferol (D3 5000) 125 MCG (5000 UT) capsule       Cyanocobalamin (B-12) 1000 MCG CAPS       escitalopram (LEXAPRO) 5 mg tablet Take 1 tablet (5 mg total) by mouth daily 30 tablet 1    Ferrous Bisglycinate Chelate 28 MG CAPS       Omega-3 Fatty Acids (Fish Oil) 1200 MG CAPS        No current facility-administered medications for this visit. No Known Allergies    Review of Systems   Constitutional:  Negative for chills and fever. HENT:  Negative for congestion, ear pain and sore throat. Respiratory:  Negative for cough, chest tightness, shortness of breath and wheezing. Cardiovascular:  Negative for chest pain and palpitations. Gastrointestinal:  Negative for abdominal pain, diarrhea, nausea and vomiting. Genitourinary:  Negative for dysuria, frequency and hematuria. Skin:  Negative for rash. Neurological:  Negative for dizziness, syncope, light-headedness and headaches. Psychiatric/Behavioral:  Negative for suicidal ideas. As noted in HPI. Video Exam    Vitals:    11/15/23 1344   Weight: 61.2 kg (135 lb)   Height: 5' 2.5" (1.588 m)       Physical Exam  Constitutional:       General: She is not in acute distress. Appearance: She is not ill-appearing or diaphoretic. HENT:      Right Ear: External ear normal.      Left Ear: External ear normal.   Pulmonary:      Effort: Pulmonary effort is normal. No respiratory distress. Neurological:      Mental Status: She is alert and oriented to person, place, and time.    Psychiatric:         Mood and Affect: Mood normal.          Visit Time  Total Visit Duration: 15 minutes

## 2023-11-20 DIAGNOSIS — E53.8 VITAMIN B12 DEFICIENCY: ICD-10-CM

## 2023-11-20 DIAGNOSIS — D64.9 ANEMIA, UNSPECIFIED TYPE: Primary | ICD-10-CM

## 2023-11-20 DIAGNOSIS — E55.9 VITAMIN D DEFICIENCY: ICD-10-CM

## 2023-12-03 ENCOUNTER — APPOINTMENT (OUTPATIENT)
Dept: LAB | Facility: CLINIC | Age: 33
End: 2023-12-03
Payer: COMMERCIAL

## 2023-12-03 DIAGNOSIS — F41.1 GENERALIZED ANXIETY DISORDER: ICD-10-CM

## 2023-12-03 DIAGNOSIS — E53.8 VITAMIN B12 DEFICIENCY: ICD-10-CM

## 2023-12-03 DIAGNOSIS — E55.9 VITAMIN D DEFICIENCY: ICD-10-CM

## 2023-12-03 DIAGNOSIS — D64.9 ANEMIA, UNSPECIFIED TYPE: ICD-10-CM

## 2023-12-03 LAB
25(OH)D3 SERPL-MCNC: 35 NG/ML (ref 30–100)
ALBUMIN SERPL BCP-MCNC: 3.9 G/DL (ref 3.5–5)
ALP SERPL-CCNC: 51 U/L (ref 34–104)
ALT SERPL W P-5'-P-CCNC: 8 U/L (ref 7–52)
ANION GAP SERPL CALCULATED.3IONS-SCNC: 4 MMOL/L
AST SERPL W P-5'-P-CCNC: 11 U/L (ref 13–39)
BASOPHILS # BLD AUTO: 0.03 THOUSANDS/ÂΜL (ref 0–0.1)
BASOPHILS NFR BLD AUTO: 0 % (ref 0–1)
BILIRUB SERPL-MCNC: 0.34 MG/DL (ref 0.2–1)
BUN SERPL-MCNC: 9 MG/DL (ref 5–25)
CALCIUM SERPL-MCNC: 8.8 MG/DL (ref 8.4–10.2)
CHLORIDE SERPL-SCNC: 105 MMOL/L (ref 96–108)
CO2 SERPL-SCNC: 27 MMOL/L (ref 21–32)
CREAT SERPL-MCNC: 0.51 MG/DL (ref 0.6–1.3)
EOSINOPHIL # BLD AUTO: 0.11 THOUSAND/ÂΜL (ref 0–0.61)
EOSINOPHIL NFR BLD AUTO: 2 % (ref 0–6)
ERYTHROCYTE [DISTWIDTH] IN BLOOD BY AUTOMATED COUNT: 14.1 % (ref 11.6–15.1)
FERRITIN SERPL-MCNC: 13 NG/ML (ref 11–307)
GFR SERPL CREATININE-BSD FRML MDRD: 124 ML/MIN/1.73SQ M
GLUCOSE P FAST SERPL-MCNC: 87 MG/DL (ref 65–99)
HCT VFR BLD AUTO: 37.8 % (ref 34.8–46.1)
HGB BLD-MCNC: 11.9 G/DL (ref 11.5–15.4)
IMM GRANULOCYTES # BLD AUTO: 0.02 THOUSAND/UL (ref 0–0.2)
IMM GRANULOCYTES NFR BLD AUTO: 0 % (ref 0–2)
IRON SATN MFR SERPL: 21 % (ref 15–50)
IRON SERPL-MCNC: 68 UG/DL (ref 50–212)
LYMPHOCYTES # BLD AUTO: 1.98 THOUSANDS/ÂΜL (ref 0.6–4.47)
LYMPHOCYTES NFR BLD AUTO: 29 % (ref 14–44)
MCH RBC QN AUTO: 28 PG (ref 26.8–34.3)
MCHC RBC AUTO-ENTMCNC: 31.5 G/DL (ref 31.4–37.4)
MCV RBC AUTO: 89 FL (ref 82–98)
MONOCYTES # BLD AUTO: 0.3 THOUSAND/ÂΜL (ref 0.17–1.22)
MONOCYTES NFR BLD AUTO: 4 % (ref 4–12)
NEUTROPHILS # BLD AUTO: 4.4 THOUSANDS/ÂΜL (ref 1.85–7.62)
NEUTS SEG NFR BLD AUTO: 65 % (ref 43–75)
NRBC BLD AUTO-RTO: 0 /100 WBCS
PLATELET # BLD AUTO: 310 THOUSANDS/UL (ref 149–390)
PMV BLD AUTO: 9.9 FL (ref 8.9–12.7)
POTASSIUM SERPL-SCNC: 3.9 MMOL/L (ref 3.5–5.3)
PROT SERPL-MCNC: 6.7 G/DL (ref 6.4–8.4)
RBC # BLD AUTO: 4.25 MILLION/UL (ref 3.81–5.12)
SODIUM SERPL-SCNC: 136 MMOL/L (ref 135–147)
TIBC SERPL-MCNC: 320 UG/DL (ref 250–450)
TSH SERPL DL<=0.05 MIU/L-ACNC: 1.66 UIU/ML (ref 0.45–4.5)
UIBC SERPL-MCNC: 252 UG/DL (ref 155–355)
VIT B12 SERPL-MCNC: 843 PG/ML (ref 180–914)
WBC # BLD AUTO: 6.84 THOUSAND/UL (ref 4.31–10.16)

## 2023-12-03 PROCEDURE — 82607 VITAMIN B-12: CPT

## 2023-12-03 PROCEDURE — 84443 ASSAY THYROID STIM HORMONE: CPT

## 2023-12-03 PROCEDURE — 82306 VITAMIN D 25 HYDROXY: CPT

## 2023-12-03 PROCEDURE — 83550 IRON BINDING TEST: CPT

## 2023-12-03 PROCEDURE — 36415 COLL VENOUS BLD VENIPUNCTURE: CPT

## 2023-12-03 PROCEDURE — 80053 COMPREHEN METABOLIC PANEL: CPT

## 2023-12-03 PROCEDURE — 83540 ASSAY OF IRON: CPT

## 2023-12-03 PROCEDURE — 82728 ASSAY OF FERRITIN: CPT

## 2023-12-03 PROCEDURE — 85025 COMPLETE CBC W/AUTO DIFF WBC: CPT

## 2023-12-04 ENCOUNTER — TELEMEDICINE (OUTPATIENT)
Dept: FAMILY MEDICINE CLINIC | Facility: CLINIC | Age: 33
End: 2023-12-04
Payer: COMMERCIAL

## 2023-12-04 VITALS — WEIGHT: 135 LBS | BODY MASS INDEX: 23.92 KG/M2 | HEIGHT: 63 IN

## 2023-12-04 DIAGNOSIS — D64.9 ANEMIA, UNSPECIFIED TYPE: ICD-10-CM

## 2023-12-04 DIAGNOSIS — F41.1 GENERALIZED ANXIETY DISORDER: Primary | ICD-10-CM

## 2023-12-04 DIAGNOSIS — E55.9 VITAMIN D DEFICIENCY: ICD-10-CM

## 2023-12-04 PROCEDURE — 99213 OFFICE O/P EST LOW 20 MIN: CPT | Performed by: NURSE PRACTITIONER

## 2023-12-04 RX ORDER — ESCITALOPRAM OXALATE 10 MG/1
10 TABLET ORAL DAILY
Qty: 30 TABLET | Refills: 1 | Status: SHIPPED | OUTPATIENT
Start: 2023-12-04 | End: 2023-12-06 | Stop reason: SDUPTHER

## 2023-12-04 NOTE — PROGRESS NOTES
Virtual Regular Visit    Verification of patient location:    Patient is located at Home in the following state in which I hold an active license [place]      Assessment/Plan:    Problem List Items Addressed This Visit          Other    Generalized anxiety disorder - Primary    Relevant Medications    escitalopram (LEXAPRO) 10 mg tablet    Patient takes lexapro 5mg daily for anxiety and it helps. Patient reports that her symptoms have improved. Patient would like to increase the dose of the medication to see if it would help more. Denies any depression or suicidal thoughts. Increased lexapro to 10mg daily. Vitamin D deficiency  Vitamin D level is 35. Continue vitamin D supplement OTC. Anemia  Hemoglobin is 11.9. Ferritin is 13. Patient reports that she is going to increase her iron supplement OTC. Reviewed lab results with the patient. Patient instructed to follow-up in 6 weeks or sooner prn. Reason for visit is   Chief Complaint   Patient presents with    Virtual Regular Visit    Follow-up          Encounter provider DONTE Kaufman    Provider located at [address] [place] [phone]      Recent Visits  No visits were found meeting these conditions. Showing recent visits within past 7 days and meeting all other requirements  Today's Visits  Date Type Provider Dept   12/04/23 Telemedicine DONTE Kaufman [facility]   Showing today's visits and meeting all other requirements  Future Appointments  No visits were found meeting these conditions. Showing future appointments within next 150 days and meeting all other requirements       The patient was identified by name and date of birth. Cesia Fofana was informed that this is a telemedicine visit and that the visit is being conducted through the [address] [place] [address] platform. She agrees to proceed. .  My office door was closed.  No one else was in the room.  She acknowledged consent and understanding of privacy and security of the video platform. The patient has agreed to participate and understands they can discontinue the visit at any time. Patient is aware this is a billable service. Amy Michaud is a 26 y.o. female  . Patient is here for a follow-up for anxiety. Patient takes lexapro 5mg daily and it helps. Patient reports that her anxiety has improved. Patient reports that she is more relaxed. Patient would like to increase the dose of the medication. Denies any depression or suicidal thoughts. Patient takes an iron supplement for anemia. Patient also takes a vitamin D supplement for vitamin D deficiency. Past Medical History:   Diagnosis Date    39 weeks gestation of pregnancy 07/15/2022    Abnormal glucose tolerance in pregnancy 07/15/2022    Acne     Anemia     Elevated blood pressure reading in office without diagnosis of hypertension 07/15/2022    Initial /88 with manual repeat of 122/66 . Denies HA, visual changes or pain. No prior elevated readings. Has BP cuff at home and will monitor and report any elevated readings 140/90 or above to OB. Migraine     Status post primary low transverse  section 2022    Varicella        Past Surgical History:   Procedure Laterality Date    NO PAST SURGERIES      [place]  DELIVERY ONLY N/A 2022    Procedure:  SECTION ();   Surgeon: Bozena Joel MD;  Location: [facility] ;  Service: Obstetrics    WISDOM TOOTH EXTRACTION         Current Outpatient Medications   Medication Sig Dispense Refill    escitalopram (LEXAPRO) 10 mg tablet Take 1 tablet (10 mg total) by mouth daily 30 tablet 1    acetaminophen (TYLENOL) 325 mg tablet Take 2 tablets (650 mg total) by mouth every 6 (six) hours  0    Cholecalciferol (D3 5000) 125 MCG (5000 UT) capsule       Cyanocobalamin (B-12) 1000 MCG CAPS       Ferrous Bisglycinate Chelate 28 MG CAPS Omega-3 Fatty Acids (Fish Oil) 1200 MG CAPS        No current facility-administered medications for this visit. No Known Allergies    Review of Systems   Constitutional:  Negative for chills and fever. HENT:  Negative for congestion, ear pain, sinus pressure and sore throat. Respiratory:  Negative for cough, chest tightness, shortness of breath and wheezing. Cardiovascular:  Negative for chest pain and palpitations. Gastrointestinal:  Negative for abdominal pain, diarrhea, nausea and vomiting. Skin:  Negative for rash. Neurological:  Negative for dizziness, seizures, syncope, light-headedness and headaches. Psychiatric/Behavioral:  Negative for suicidal ideas. As noted in HPI. Video Exam    Vitals:    12/04/23 1604   Weight: 61.2 kg (135 lb)   Height: 5' 2.5" (1.588 m)       Physical Exam  Constitutional:       General: She is not in acute distress. Appearance: She is not ill-appearing or diaphoretic. HENT:      Right Ear: External ear normal.      Left Ear: External ear normal.   Pulmonary:      Effort: Pulmonary effort is normal. No respiratory distress. Neurological:      Mental Status: She is alert and oriented to person, place, and time.    Psychiatric:         Mood and Affect: Mood normal.          Visit Time  Total Visit Duration: 8 minutes

## 2023-12-06 DIAGNOSIS — F41.1 GENERALIZED ANXIETY DISORDER: ICD-10-CM

## 2023-12-06 RX ORDER — ESCITALOPRAM OXALATE 5 MG/1
5 TABLET ORAL DAILY
Qty: 90 TABLET | Refills: 1 | Status: SHIPPED | OUTPATIENT
Start: 2023-12-06

## 2023-12-11 ENCOUNTER — TELEMEDICINE (OUTPATIENT)
Dept: BEHAVIORAL/MENTAL HEALTH CLINIC | Facility: CLINIC | Age: 33
End: 2023-12-11
Payer: COMMERCIAL

## 2023-12-11 DIAGNOSIS — F41.1 GENERALIZED ANXIETY DISORDER: Primary | ICD-10-CM

## 2023-12-11 PROCEDURE — 90791 PSYCH DIAGNOSTIC EVALUATION: CPT | Performed by: SOCIAL WORKER
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
## 2024-01-03 ENCOUNTER — TELEMEDICINE (OUTPATIENT)
Dept: BEHAVIORAL/MENTAL HEALTH CLINIC | Facility: CLINIC | Age: 34
End: 2024-01-03
Payer: COMMERCIAL

## 2024-01-03 DIAGNOSIS — F41.1 GENERALIZED ANXIETY DISORDER: Primary | ICD-10-CM

## 2024-01-03 PROCEDURE — 90834 PSYTX W PT 45 MINUTES: CPT | Performed by: SOCIAL WORKER

## 2024-01-03 NOTE — BH TREATMENT PLAN
Outpatient Behavioral Health Psychotherapy Treatment Plan    Alondra Berman  1990     Date of Initial Psychotherapy Assessment: 12/11/23   Date of Current Treatment Plan: 01/03/24  Treatment Plan Target Date: 7/3/24  Treatment Plan Expiration Date: 7/3/24    Diagnosis:   1. Generalized anxiety disorder            Area(s) of Need:  I  lack confidence. This impacts my personal life and my career.      Long Term Goal 1 (in the client's own words): I want to practice reframing my negative thinking while practicing being kinder and non judgmental towards myself so that I can build deeper connections with others.     Stage of Change: Action    Target Date for completion: 7/3/24     Anticipated therapeutic modalities: Supportive Psychotherapy      People identified to complete this goal: Alondra Casanova      Objective 1: (identify the means of measuring success in meeting the objective): I will actively practice being nonjudgmental and kind towards myself.       Objective 2: (identify the means of measuring success in meeting the objective): I will utilize therapy sessions to increase my awareness of this process.       I am currently under the care of a [facility] psychiatric provider: no    My [facility] psychiatric provider is:     I am currently taking psychiatric medications: No    I feel that I will be ready for discharge from mental health care when I reach the following (measurable goal/objective): When I am more confident, happier with myself.     For children and adults who have a legal guardian:   Has there been any change to custody orders and/or guardianship status? No. If yes, attach updated documentation.    I have created my Crisis Plan and have been offered a copy of this plan    Behavioral Health Treatment Plan [facility]: Diagnosis and Treatment Plan explained to Alondrakaterina Canosskaterina Berman acknowledges an understanding of their diagnosis. Alondra Berman agrees to this treatment plan.    I have been offered a  copy of this Treatment Plan. yes

## 2024-01-03 NOTE — PSYCH
"Behavioral Health Psychotherapy Progress Note    Psychotherapy Provided: Individual Psychotherapy     1. Generalized anxiety disorder            Goals addressed in session: Goal 1     DATA: Alondra met with this worker for her first therapy session following Intake.  She provided additional background information re: her relationship with her , his family and what it was like to engage with them over the recent holiday season.  She reported that she engaged in therapy in the past and was diagnosed with PTSD.  During this session, this clinician used the following therapeutic modalities: Supportive Psychotherapy    Substance Abuse was not addressed during this session. If the client is diagnosed with a co-occurring substance use disorder, please indicate any changes in the frequency or amount of use: . Stage of change for addressing substance use diagnoses: No substance use/Not applicable    ASSESSMENT:  Alondra Berman presents with a Euthymic/ normal mood. She indicated how much she is already enjoying sessions with this worker and how excited she is to begin to make positive changes.  She will explore the concept of the window of tolerance and will practice reframing some of her negative thinking.       her affect is Normal range and intensity, which is congruent, with her mood and the content of the session. The client has made progress on their goals.     Alondra Berman presents with a none risk of suicide, none risk of self-harm, and none risk of harm to others.    For any risk assessment that surpasses a \"low\" rating, a safety plan must be developed.    A safety plan was indicated: no  If yes, describe in detail     PLAN: Between sessions, Alondra Berman will begin to notice her feelings while observing her thoughts with less judgement. At the next session, the therapist will use Supportive Psychotherapy to address the above in further detail.    Behavioral Health Treatment Plan and Discharge Planning: Alondra " Cullen is aware of and agrees to continue to work on their treatment plan. They have identified and are working toward their discharge goals. yes    Visit start and stop times:    01/03/24  Start Time: 1200  Stop Time: 1250  Total Visit Time: 50 minutes

## 2024-01-03 NOTE — BH TREATMENT PLAN
Outpatient Behavioral Health Psychotherapy Treatment Plan    Alondra Berman  1990     Date of Initial Psychotherapy Assessment: 12/11/23   Date of Current Treatment Plan: 01/03/24  Treatment Plan Target Date: 7/3/24  Treatment Plan Expiration Date: 7/3/24    Diagnosis:   1. Generalized anxiety disorder            Area(s) of Need: I engage in negative self talk,     Long Term Goal 1 (in the client's own words):     Stage of Change: Action    Target Date for completion: 7/3/24     Anticipated therapeutic modalities: Supportive Psychotherapy      People identified to complete this goal: Alondra Casanova      Objective 1: (identify the means of measuring success in meeting the objective):       Objective 2: (identify the means of measuring success in meeting the objective):       I am currently under the care of a [facility] psychiatric provider: no    My [facility] psychiatric provider is:     I am currently taking psychiatric medications: No    I feel that I will be ready for discharge from mental health care when I reach the following (measurable goal/objective):     For children and adults who have a legal guardian:   Has there been any change to custody orders and/or guardianship status? No. If yes, attach updated documentation.    I have created my Crisis Plan and have been offered a copy of this plan    Behavioral Health Treatment Plan  [facility]: Diagnosis and Treatment Plan explained to Alondrakatreina Berman acknowledges an understanding of their diagnosis. Alondra Berman agrees to this treatment plan.    I have been offered a copy of this Treatment Plan. yes

## 2024-01-03 NOTE — BH CRISIS PLAN
Client Name: Alondra Berman       Client YOB: 1990  : 1990    Treatment Team (include name and contact information):     Psychotherapist: Jocelyne García LCSW    Healthcare Provider  Sanford Matias MD   [address] [address]  [phone]    Type of Plan   * Child plans (children 14 yo and younger) must be completed and signed by the child's legal guardian   * Plans for all individuals 13 yo and above must be signed by the client.     Plan Type: adolescent/adult (14 and over) Initial      My Personal Strengths are (in the client's own words):empathy, organization, curiosity    The stressors and triggers that may put me at risk are:  Being treated poorly by others, angry patients, not having enough support.     Coping skills I can use to keep myself calm and safe: observe negative thoughts, be kind to myself, take deep breaths, practice gratitude    Coping skills/supports I can use to maintain abstinence from substance use:   Not Applicable    The people that provide me with help and support: (Include name, contact, and how they can help)   Support person #1: Dejon    * Phone number: in cell phone    * How can they help me? Listening,      Support person #2:Jocelyne    * Phone number: in cell phone    * How can they help me? reinforcing the skills I am learning       In the past, the following has helped me in times of crisis:          If it is an emergency and you need immediate help, call     If there is a possibility of danger to yourself or others, call the following crisis hotline resources:     Adult Crisis Numbers  Suicide Prevention Hotline - Dial   [facility]: [phone]  [facility]: [phone]  [facility]: [phone]  [facility]: [phone]  [place]: [phone]  [facility]: [phone]  [facility]: [phone]  [facility]: [phone] (daytime).       [phone] (after  hours, weekends, holidays)     Child/Adolescent Crisis Numbers   [facility]: [phone]   [facility]: [phone]   [place]: [phone]   [place]: [phone]    Please note: Some [facility] do not have a separate number for Child/Adolescent specific crisis. If your county is not listed under Child/Adolescent, please call the adult number for your county     National Talk to Text Line   All Ages - [phone]    In the event your feelings become unmanageable, and you cannot reach your support system, you will call 911 immediately or go to the nearest hospital emergency room.

## 2024-01-15 ENCOUNTER — TELEMEDICINE (OUTPATIENT)
Dept: BEHAVIORAL/MENTAL HEALTH CLINIC | Facility: CLINIC | Age: 34
End: 2024-01-15
Payer: COMMERCIAL

## 2024-01-15 DIAGNOSIS — F41.1 GENERALIZED ANXIETY DISORDER: Primary | ICD-10-CM

## 2024-01-15 PROCEDURE — 90834 PSYTX W PT 45 MINUTES: CPT | Performed by: SOCIAL WORKER

## 2024-01-15 NOTE — PSYCH
"Behavioral Health Psychotherapy Progress Note    Psychotherapy Provided: Individual Psychotherapy     1. Generalized anxiety disorder              Goals addressed in session: Goal 1     DATA: Alondra spoke with this worker today about her efforts to view herself in a more positive light while noticing her thoughts and engaging in grounding exercises.   She discussed factors that impede this process including patients' requests to see \"a real doctor.\"    During this session, this clinician used the following therapeutic modalities: Supportive Psychotherapy    Substance Abuse was not addressed during this session. If the client is diagnosed with a co-occurring substance use disorder, please indicate any changes in the frequency or amount of use: . Stage of change for addressing substance use diagnoses: No substance use/Not applicable    ASSESSMENT:  Alondra Berman presents with a Euthymic/ normal mood. She was provided with information on the window of tolerance today to review and is very invested in increasing her self compassion.  Her next session will be used to address ways she can decreasing her \"comparing\" and letting go of the concern about whether or not people like her.       her affect is Normal range and intensity, which is congruent, with her mood and the content of the session. The client has made progress on their goals.     Alondra Berman presents with a none risk of suicide, none risk of self-harm, and none risk of harm to others.    For any risk assessment that surpasses a \"low\" rating, a safety plan must be developed.    A safety plan was indicated: no  If yes, describe in detail     PLAN: Between sessions, Alondra Berman will begin to notice her feelings while observing her thoughts with less judgement. At the next session, the therapist will use Supportive Psychotherapy to address the above in further detail.    Behavioral Health Treatment Plan and Discharge Planning: Alondra Berman is aware of and agrees to " continue to work on their treatment plan. They have identified and are working toward their discharge goals. yes    Visit start and stop times:    01/15/24  Start Time: 1200  Stop Time: 1250  Total Visit Time: 50 minutes

## 2024-01-17 ENCOUNTER — OFFICE VISIT (OUTPATIENT)
Dept: OBGYN CLINIC | Facility: CLINIC | Age: 34
End: 2024-01-17
Payer: COMMERCIAL

## 2024-01-17 ENCOUNTER — HOSPITAL ENCOUNTER (OUTPATIENT)
Dept: RADIOLOGY | Facility: HOSPITAL | Age: 34
Discharge: HOME | End: 2024-01-17
Payer: COMMERCIAL

## 2024-01-17 VITALS — OXYGEN SATURATION: 99 % | HEART RATE: 128 BPM | HEIGHT: 63 IN | BODY MASS INDEX: 24.3 KG/M2

## 2024-01-17 DIAGNOSIS — M79.645 FINGER PAIN, LEFT: Primary | ICD-10-CM

## 2024-01-17 DIAGNOSIS — M79.645 FINGER PAIN, LEFT: ICD-10-CM

## 2024-01-17 DIAGNOSIS — M65.4 DE QUERVAIN'S TENOSYNOVITIS, LEFT: ICD-10-CM

## 2024-01-17 PROCEDURE — 73140 X-RAY EXAM OF FINGER(S): CPT

## 2024-01-17 PROCEDURE — 99203 OFFICE O/P NEW LOW 30 MIN: CPT | Performed by: PHYSICIAN ASSISTANT

## 2024-01-17 NOTE — PROGRESS NOTES
"ASSESSMENT/PLAN:    Assessment:   de Quervain stenosis tenosynovitis  left   - asymptomatic at this time    Plan:   Comfort cool as needed for activity    Follow Up:  6 weeks with Dr. Riojas    To Do Next Visit:       General Discussions:     De Quervain Tenosynovitis: The anatomy and physiology of de Quervain's tenosynovitis was discussed with the patient today in the office.  Edema and increased contact pressure within the first dorsal extensor compartment at the radial styloid can cause pain, crepitation, and limitation of function.  Treatment options include resting thumb spica splints to decrease edema, oral anti-inflammatory medications, home or formal therapy exercises, up to 2 steroid injections within the first dorsal extensor compartment, or surgical release.  While majority of patients do respond to conservative treatment, up to 20% may require surgical release.         _____________________________________________________  CHIEF COMPLAINT:  Chief Complaint   Patient presents with    Left Thumb - Pain     ongoing  1 weeks ago felt like it \"popped in and out\" had swelling, happened again yesterday         SUBJECTIVE:  Cesia Berman is a 34 y.o. female who presents with Pain  Mild  Intermittant  Sharp and Dull of the left proximal thumb.  This started  1 week(s) ago: Insidious.    Radiation: None  Previous Treatments: None   Associated symptoms: None  Handedness: right  Work status: Endocrine PA    PAST MEDICAL HISTORY:  Past Medical History:   Diagnosis Date    39 weeks gestation of pregnancy 07/15/2022    Abnormal glucose tolerance in pregnancy 07/15/2022    Acne     Anemia     Elevated blood pressure reading in office without diagnosis of hypertension 07/15/2022    Initial /88 with manual repeat of 122/66 . Denies HA, visual changes or pain. No prior elevated readings. Has BP cuff at home and will monitor and report any elevated readings 140/90 or above to OB.     Migraine     Status post " primary low transverse  section 2022    Varicella        PAST SURGICAL HISTORY:  Past Surgical History:   Procedure Laterality Date    NO PAST SURGERIES      DC  DELIVERY ONLY N/A 2022    Procedure:  SECTION ();  Surgeon: Betsy Navarro MD;  Location: [facility] ;  Service: Obstetrics    WISDOM TOOTH EXTRACTION         FAMILY HISTORY:  Family History   Problem Relation Age of Onset    Glaucoma Mother     Hyperlipidemia Father     Diabetes type II Father         pre diabetes    Hypertension Father     Heart disease Maternal Grandmother     Heart disease Maternal Grandfather     No Known Problems Sister     Heart disease Paternal Grandmother         valvular heart surgery    No Known Problems Paternal Grandfather        SOCIAL HISTORY:  Social History     Tobacco Use    Smoking status: Never    Smokeless tobacco: Never   Vaping Use    Vaping status: Never Used   Substance Use Topics    Alcohol use: Not Currently     Comment: Occasional     Drug use: Never     Comment: it drugs:   none- As per Jessica        MEDICATIONS:    Current Outpatient Medications:     acetaminophen (TYLENOL) 325 mg tablet, Take 2 tablets (650 mg total) by mouth every 6 (six) hours, Disp: , Rfl: 0    Cholecalciferol (D3 5000) 125 MCG (5000 UT) capsule, , Disp: , Rfl:     Cyanocobalamin (B-12) 1000 MCG CAPS, , Disp: , Rfl:     escitalopram (LEXAPRO) 5 mg tablet, Take 1 tablet (5 mg total) by mouth daily, Disp: 90 tablet, Rfl: 1    Ferrous Bisglycinate Chelate 28 MG CAPS, , Disp: , Rfl:     Omega-3 Fatty Acids (Fish Oil) 1200 MG CAPS, , Disp: , Rfl:     ALLERGIES:  No Known Allergies    REVIEW OF SYSTEMS:  Pertinent items are noted in HPI.  A comprehensive review of systems was negative.    LABS:  HgA1c:   Lab Results   Component Value Date    HGBA1C 5.7 (H) 2023     BMP:   Lab Results   Component Value Date    CALCIUM 8.8 2023    K 3.9 2023    CO2 27 2023     2023    BUN 9  "12/03/2023    CREATININE 0.51 (L) 12/03/2023         _____________________________________________________  PHYSICAL EXAMINATION:  Vital signs: Pulse (!) 128   Ht 5' 2.5\" (1.588 m)   SpO2 99%   BMI 24.30 kg/m²   General: well developed and well nourished, alert, oriented times 3, and appears comfortable  Psychiatric: Normal  HEENT: Trachea Midline, No torticollis  Cardiovascular: Regular rate and rhythm  Pulmonary: No audible wheezing   Abdomen: No guarding  Extremities: No lymphedema  Skin: No masses, erythema, lacerations, fluctation, ulcerations  Neurovascular: Sensation Intact to the Median, Ulnar, Radial Nerve, Motor Intact to the Median, Ulnar, Radial Nerve, and Pulses Intact    MUSCULOSKELETAL EXAMINATION:  Left thumb - No swelling, erythema, or ecchymosis.  No point tenderness now, but she points to the 1st dorsal compartment as to where the pain used to be.  Neg eichhoff test (pt indicates this motion would have been painful earlier.)  No nodules or triggering.  No CMC grind or shuck.  Full, pain-free ROM of the IP, MPC, and CMC with no laxity.  NVI distally.        _____________________________________________________  STUDIES REVIEWED:  3 views of the left thumb were done in the office today.  No acute displaced fracture.  Radiologist reading pending.      PROCEDURES PERFORMED:  Procedures  No Procedures performed today    "

## 2024-01-29 ENCOUNTER — TELEMEDICINE (OUTPATIENT)
Dept: BEHAVIORAL/MENTAL HEALTH CLINIC | Facility: CLINIC | Age: 34
End: 2024-01-29
Payer: COMMERCIAL

## 2024-01-29 DIAGNOSIS — F41.1 GENERALIZED ANXIETY DISORDER: Primary | ICD-10-CM

## 2024-01-29 PROCEDURE — 90834 PSYTX W PT 45 MINUTES: CPT | Performed by: SOCIAL WORKER

## 2024-01-31 ENCOUNTER — TELEPHONE (OUTPATIENT)
Dept: PSYCHIATRY | Facility: CLINIC | Age: 34
End: 2024-01-31

## 2024-01-31 NOTE — TELEPHONE ENCOUNTER
Cesia Berman  requested a call back to discuss a billing issue with her insurance,.    They can be reached at P# [phone].       Thank you.

## 2024-02-12 ENCOUNTER — TELEMEDICINE (OUTPATIENT)
Dept: BEHAVIORAL/MENTAL HEALTH CLINIC | Facility: CLINIC | Age: 34
End: 2024-02-12
Payer: COMMERCIAL

## 2024-02-12 DIAGNOSIS — F41.1 GENERALIZED ANXIETY DISORDER: Primary | ICD-10-CM

## 2024-02-12 PROCEDURE — 90834 PSYTX W PT 45 MINUTES: CPT | Performed by: SOCIAL WORKER

## 2024-02-12 NOTE — PSYCH
"Behavioral Health Psychotherapy Progress Note    Psychotherapy Provided: Individual Psychotherapy     1. Generalized anxiety disorder              Goals addressed in session: Goal 1     DATA: Alondra spoke with this worker today about her work to \"insert a pause\" between her feelings and negative thoughts.  She gave examples of times that have been difficult in doing this successfully.     During this session, this clinician used the following therapeutic modalities: Supportive Psychotherapy    Substance Abuse was not addressed during this session. If the client is diagnosed with a co-occurring substance use disorder, please indicate any changes in the frequency or amount of use: . Stage of change for addressing substance use diagnoses: No substance use/Not applicable    ASSESSMENT:  Alondra Berman presents with a Euthymic/ normal mood. Alondra continues to both enjoy and benefit from therapy sessions and is very receptive to ways to challenge her thinking.  She takes notes and agrees to practice challenging her reactions.  her affect is Normal range and intensity, which is congruent, with her mood and the content of the session. The client has made progress on their goals.     Alondra Berman presents with a none risk of suicide, none risk of self-harm, and none risk of harm to others.    For any risk assessment that surpasses a \"low\" rating, a safety plan must be developed.    A safety plan was indicated: no  If yes, describe in detail     PLAN: Between sessions, Alondra Berman will continue to notice her feelings while observing her thoughts with less judgement. She will \"insert a pause.\"  At the next session, the therapist will use Supportive Psychotherapy to address the above in further detail.    Behavioral Health Treatment Plan and Discharge Planning: Alondra Berman is aware of and agrees to continue to work on their treatment plan. They have identified and are working toward their discharge goals. yes    Visit start and " stop times:    2/12/24  Start Time: 1500  Stop Time: 1550  Total Visit Time: 50 minutes

## 2024-02-26 ENCOUNTER — TELEMEDICINE (OUTPATIENT)
Dept: BEHAVIORAL/MENTAL HEALTH CLINIC | Facility: CLINIC | Age: 34
End: 2024-02-26
Payer: COMMERCIAL

## 2024-02-26 DIAGNOSIS — F41.1 GENERALIZED ANXIETY DISORDER: Primary | ICD-10-CM

## 2024-02-26 PROCEDURE — 90834 PSYTX W PT 45 MINUTES: CPT | Performed by: SOCIAL WORKER

## 2024-02-26 NOTE — PSYCH
"Behavioral Health Psychotherapy Progress Note    Psychotherapy Provided: Individual Psychotherapy     1. Generalized anxiety disorder                Goals addressed in session: Goal 1     DATA: Alondra spoke with this worker today about her feelings re: her work to decrease her negative thoughts.  She admitted that thinking positive things about herself continues to be difficult however she was receptive to recognizing there is more than \"right, wrong.\"  She also reported that while she feels better, she struggles to see the changes she is making on a day to day basis.   During this session, this clinician used the following therapeutic modalities: Supportive Psychotherapy    Substance Abuse was not addressed during this session. If the client is diagnosed with a co-occurring substance use disorder, please indicate any changes in the frequency or amount of use: . Stage of change for addressing substance use diagnoses: No substance use/Not applicable    ASSESSMENT:  Alondra Berman presents with a Euthymic/ normal mood. Alondra continues to both enjoy and benefit from therapy sessions and is very receptive to ways to challenge her thinking.  She takes notes and agrees to practice challenging her reactions.  her affect is Normal range and intensity, which is congruent, with her mood and the content of the session. The client has made progress on their goals.     Alondra Berman presents with a none risk of suicide, none risk of self-harm, and none risk of harm to others.    For any risk assessment that surpasses a \"low\" rating, a safety plan must be developed.    A safety plan was indicated: no  If yes, describe in detail     PLAN: Between sessions, Alondra Berman will continue to notice her feelings while observing her thoughts with less judgement. She will \"insert a pause.\"  At the next session, the therapist will use Supportive Psychotherapy to address the above in further detail.    Behavioral Health Treatment Plan and " Discharge Planning: Alondra Berman is aware of and agrees to continue to work on their treatment plan. They have identified and are working toward their discharge goals. yes    Visit start and stop times:    2/26/24  Start Time: 1200  Stop Time: 1250  Total Visit Time: 50 minutes

## 2024-03-11 ENCOUNTER — TELEMEDICINE (OUTPATIENT)
Dept: BEHAVIORAL/MENTAL HEALTH CLINIC | Facility: CLINIC | Age: 34
End: 2024-03-11
Payer: COMMERCIAL

## 2024-03-11 DIAGNOSIS — F41.1 GENERALIZED ANXIETY DISORDER: Primary | ICD-10-CM

## 2024-03-11 PROCEDURE — 90834 PSYTX W PT 45 MINUTES: CPT | Performed by: SOCIAL WORKER

## 2024-03-11 NOTE — PSYCH
"Behavioral Health Psychotherapy Progress Note    Psychotherapy Provided: Individual Psychotherapy     1. Generalized anxiety disorder            Goals addressed in session: Goal 1     DATA: Alondra spoke with this worker today about her feelings re: not being invited to spend time with her cousins as her sister was invited.  She expressed how lonely this feels while also admitting that they are not people that she would choose to spend time with.  Alondra vocalized ways that she has improved her confidence at work and this has resulted in less anxiety there.    During this session, this clinician used the following therapeutic modalities: Supportive Psychotherapy    Substance Abuse was not addressed during this session. If the client is diagnosed with a co-occurring substance use disorder, please indicate any changes in the frequency or amount of use: . Stage of change for addressing substance use diagnoses: No substance use/Not applicable    ASSESSMENT:  Alondra Berman presents with a Euthymic/ normal mood. Alondra continues to both enjoy and benefit from therapy sessions and is very receptive to ways to challenge her thinking.  She takes notes and continues to practice challenging her reactions.  her affect is Normal range and intensity, which is congruent, with her mood and the content of the session. The client has made progress on their goals.     Alondra Berman presents with a none risk of suicide, none risk of self-harm, and none risk of harm to others.    For any risk assessment that surpasses a \"low\" rating, a safety plan must be developed.    A safety plan was indicated: no  If yes, describe in detail     PLAN: Between sessions, Alondra Berman will continue to notice her feelings while observing her thoughts with less judgement. She will \"insert a pause.\"  At the next session, the therapist will use Supportive Psychotherapy to address the above in further detail.    Behavioral Health Treatment Plan and Discharge " Planning: Alondra Berman is aware of and agrees to continue to work on their treatment plan. They have identified and are working toward their discharge goals. yes    Visit start and stop times:    3/11/24  Start Time: 1200  Stop Time: 1243  Total Visit Time: 43 minutes

## 2024-03-20 ENCOUNTER — TELEMEDICINE (OUTPATIENT)
Dept: BEHAVIORAL/MENTAL HEALTH CLINIC | Facility: CLINIC | Age: 34
End: 2024-03-20

## 2024-03-20 DIAGNOSIS — F41.1 GENERALIZED ANXIETY DISORDER: Primary | ICD-10-CM

## 2024-03-20 NOTE — PSYCH
"Behavioral Health Psychotherapy Progress Note    Psychotherapy Provided: Individual Psychotherapy     1. Generalized anxiety disorder              Goals addressed in session: Goal 1     DATA: Alondra spoke with this worker today about her feelings re: the work she is doing in therapy and ways that she has increased her self confidence.  She reported that she is excited to be learning new skills, ways to challenge her thinking.     During this session, this clinician used the following therapeutic modalities: Supportive Psychotherapy    Substance Abuse was not addressed during this session. If the client is diagnosed with a co-occurring substance use disorder, please indicate any changes in the frequency or amount of use: . Stage of change for addressing substance use diagnoses: No substance use/Not applicable    ASSESSMENT:  Alondra Berman presents with a Euthymic/ normal mood. Alondra continues to both enjoy and benefit from therapy sessions and is very receptive to ways to challenge her thinking.  She takes notes and continues to practice challenging her reactions.  her affect is Normal range and intensity, which is congruent, with her mood and the content of the session. The client has made progress on their goals.     Alondra Berman presents with a none risk of suicide, none risk of self-harm, and none risk of harm to others.    For any risk assessment that surpasses a \"low\" rating, a safety plan must be developed.    A safety plan was indicated: no  If yes, describe in detail     PLAN: Between sessions, Alondra Berman will continue to notice her feelings while observing her thoughts with less judgement. She will \"insert a pause.\"  At the next session, the therapist will use Supportive Psychotherapy to address the above in further detail.    Behavioral Health Treatment Plan and Discharge Planning: Alondra Berman is aware of and agrees to continue to work on their treatment plan. They have identified and are working toward " their discharge goals. yes    Visit start and stop times:    3/20/24  Start Time: 1400  Stop Time: 1445  Total Visit Time: 45 minutes

## 2024-04-08 ENCOUNTER — TELEMEDICINE (OUTPATIENT)
Dept: BEHAVIORAL/MENTAL HEALTH CLINIC | Facility: CLINIC | Age: 34
End: 2024-04-08

## 2024-04-08 DIAGNOSIS — F41.1 GENERALIZED ANXIETY DISORDER: Primary | ICD-10-CM

## 2024-04-09 NOTE — PSYCH
"Behavioral Health Psychotherapy Progress Note    Psychotherapy Provided: Individual Psychotherapy     1. Generalized anxiety disorder                Goals addressed in session: Goal 1     DATA: Alondra spoke with this worker today about her feelings re: the work she has been  doing in therapy and ways that she has increased her sense of self.  She reported that she has been enjoying work and provided examples of ways that she has been \"liking herself\" more.  Ways to become more aware of the thoughts that negatively impact the window of tolerance were discussed in detail and a handout was shared.  She verbalized that her mom has told her (and she agrees) that she has \"lost her warmth,\" and feels \"spacey\" fatigued and that she has difficulty relaxing.  The Lexapro, 5 mgs. Has greatly assisted in decreasing her anxiety.  During this session, this clinician used the following therapeutic modalities: Supportive Psychotherapy    Substance Abuse was not addressed during this session. If the client is diagnosed with a co-occurring substance use disorder, please indicate any changes in the frequency or amount of use: . Stage of change for addressing substance use diagnoses: No substance use/Not applicable    ASSESSMENT:  Alondra Berman presents with a Euthymic/ normal mood. Alondra continues to both enjoy and benefit from therapy sessions and is very receptive to ways to challenge her thinking.  She takes notes and continues to practice challenging her reactions. She was open to feedback on the ways that her history of people-pleasing may have contributed to her loss of sense of self.   her affect is Normal range and intensity, which is congruent, with her mood and the content of the session. The client has made progress on their goals.     Alondra Berman presents with a none risk of suicide, none risk of self-harm, and none risk of harm to others.    For any risk assessment that surpasses a \"low\" rating, a safety plan must be " "developed.    A safety plan was indicated: no  If yes, describe in detail     PLAN: Between sessions, Alondra Berman will continue to notice her feelings while observing her thoughts with less judgement. She will \"insert a pause.\"  At the next session, the therapist will use Supportive Psychotherapy to address the above in further detail.    Behavioral Health Treatment Plan and Discharge Planning: Alondra Berman is aware of and agrees to continue to work on their treatment plan. They have identified and are working toward their discharge goals. yes    Visit start and stop times:    4/8/24  Start Time: 1300  Stop Time: 1350  Total Visit Time: 50 minutes  "

## 2024-04-22 ENCOUNTER — TELEMEDICINE (OUTPATIENT)
Dept: BEHAVIORAL/MENTAL HEALTH CLINIC | Facility: CLINIC | Age: 34
End: 2024-04-22
Payer: COMMERCIAL

## 2024-04-22 DIAGNOSIS — F41.1 GENERALIZED ANXIETY DISORDER: Primary | ICD-10-CM

## 2024-04-22 PROCEDURE — 90832 PSYTX W PT 30 MINUTES: CPT | Performed by: SOCIAL WORKER

## 2024-04-22 NOTE — PSYCH
"Behavioral Health Psychotherapy Progress Note    Psychotherapy Provided: Individual Psychotherapy     1. Generalized anxiety disorder            Goals addressed in session: Goal 1     DATA: Alondra spoke with this worker today about her feelings re: the positive impact that therapy has on her sense of self, ability to tolerate distress.  She expressed feeling as if it is time for her to practice these skills and is comfortable scheduling appts on an \"as needed basis. \"   During this session, this clinician used the following therapeutic modalities: Supportive Psychotherapy    Substance Abuse was not addressed during this session. If the client is diagnosed with a co-occurring substance use disorder, please indicate any changes in the frequency or amount of use: . Stage of change for addressing substance use diagnoses: No substance use/Not applicable    ASSESSMENT:  Alondra Berman presents with a Euthymic/ normal mood. Alondra is able to recognize the changes that she has made since starting therapy and feels confident in her ability to implement them into her daily life.     her affect is Normal range and intensity, which is congruent, with her mood and the content of the session. The client has made progress on their goals.     Alondra Berman presents with a none risk of suicide, none risk of self-harm, and none risk of harm to others.    For any risk assessment that surpasses a \"low\" rating, a safety plan must be developed.    A safety plan was indicated: no  If yes, describe in detail     PLAN: Between sessions, Alondra Berman will continue to notice her feelings while observing her thoughts with less judgement. She will \"insert a pause.\"  She will reach out to this worker for future appts.     Behavioral Health Treatment Plan and Discharge Planning: Alondra Berman is aware of and agrees to continue to work on their treatment plan. They have identified and are working toward their discharge goals. yes    Visit start and stop " times:    4/22/24  Start Time: 1200  Stop Time: 1220  Total Visit Time: 20 minutes

## 2024-05-29 ENCOUNTER — TELEPHONE (OUTPATIENT)
Age: 34
End: 2024-05-29

## 2024-05-29 NOTE — TELEPHONE ENCOUNTER
Patient has phy appt for 8/12 Request PCP to order labs. Will go to [facility] lab Please advise

## 2024-05-30 DIAGNOSIS — Z79.899 OTHER LONG TERM (CURRENT) DRUG THERAPY: ICD-10-CM

## 2024-05-30 DIAGNOSIS — E78.2 MIXED HYPERLIPIDEMIA: Primary | ICD-10-CM

## 2024-05-30 DIAGNOSIS — E55.9 VITAMIN D DEFICIENCY: ICD-10-CM

## 2024-05-30 DIAGNOSIS — Z00.00 WELL ADULT EXAM: ICD-10-CM

## 2024-05-30 DIAGNOSIS — E53.8 B12 DEFICIENCY: ICD-10-CM

## 2024-06-15 DIAGNOSIS — Z00.6 ENCOUNTER FOR EXAMINATION FOR NORMAL COMPARISON OR CONTROL IN CLINICAL RESEARCH PROGRAM: ICD-10-CM

## 2024-08-09 RX ORDER — CRISABOROLE 20 MG/G
1 OINTMENT TOPICAL 2 TIMES DAILY
COMMUNITY

## 2024-08-09 RX ORDER — CLOBETASOL PROPIONATE 0.05 MG/G
GEL TOPICAL
COMMUNITY

## 2024-08-09 RX ORDER — ECONAZOLE NITRATE 10 MG/G
CREAM TOPICAL
COMMUNITY

## 2024-08-09 RX ORDER — VALACYCLOVIR HYDROCHLORIDE 500 MG/1
TABLET, FILM COATED ORAL
COMMUNITY

## 2024-08-09 RX ORDER — TRIAMCINOLONE ACETONIDE 1 MG/G
OINTMENT TOPICAL
COMMUNITY

## 2024-08-09 RX ORDER — ONDANSETRON 4 MG/1
TABLET, ORALLY DISINTEGRATING ORAL
COMMUNITY
End: 2024-08-12

## 2024-08-09 RX ORDER — SPIRONOLACTONE 50 MG/1
1 TABLET, FILM COATED ORAL DAILY
COMMUNITY

## 2024-08-09 RX ORDER — HYDROCORTISONE 25 MG/G
OINTMENT TOPICAL
COMMUNITY

## 2024-08-09 RX ORDER — TACROLIMUS 1 MG/G
OINTMENT TOPICAL
COMMUNITY

## 2024-08-09 RX ORDER — AZITHROMYCIN 250 MG/1
TABLET, FILM COATED ORAL
COMMUNITY
End: 2024-08-12

## 2024-08-09 RX ORDER — TACROLIMUS 0.3 MG/G
OINTMENT TOPICAL
COMMUNITY

## 2024-08-12 ENCOUNTER — OFFICE VISIT (OUTPATIENT)
Dept: FAMILY MEDICINE CLINIC | Facility: CLINIC | Age: 34
End: 2024-08-12
Payer: COMMERCIAL

## 2024-08-12 VITALS
BODY MASS INDEX: 24.1 KG/M2 | DIASTOLIC BLOOD PRESSURE: 74 MMHG | RESPIRATION RATE: 16 BRPM | WEIGHT: 136 LBS | SYSTOLIC BLOOD PRESSURE: 122 MMHG | HEIGHT: 63 IN | OXYGEN SATURATION: 98 % | HEART RATE: 80 BPM

## 2024-08-12 DIAGNOSIS — Z00.00 WELL ADULT EXAM: Primary | ICD-10-CM

## 2024-08-12 DIAGNOSIS — E61.1 IRON DEFICIENCY: ICD-10-CM

## 2024-08-12 DIAGNOSIS — E55.9 VITAMIN D DEFICIENCY: ICD-10-CM

## 2024-08-12 DIAGNOSIS — F41.1 GENERALIZED ANXIETY DISORDER: ICD-10-CM

## 2024-08-12 PROCEDURE — 99395 PREV VISIT EST AGE 18-39: CPT | Performed by: FAMILY MEDICINE

## 2024-08-12 RX ORDER — ESCITALOPRAM OXALATE 5 MG/1
5 TABLET ORAL DAILY
Qty: 90 TABLET | Refills: 1 | Status: SHIPPED | OUTPATIENT
Start: 2024-08-12

## 2024-08-12 NOTE — PROGRESS NOTES
"Ambulatory Visit  Name: Cesia Berman      : 1990      MRN: [id number]  Encounter Provider: Sanford Matias MD  Encounter Date: 2024   Encounter department: [facility]      Assessment & Plan  1. Iron Deficiency.  Her ferritin level is 13, which is low, and she reports feeling tired. She has self-increased her iron tablet to 65 mg twice a day without experiencing constipation or abdominal pain. An iron panel will be added to her lab work to monitor her levels. If her iron levels do not improve, iron infusions may be considered.    2. Vitamin D Deficiency.  Her vitamin D level is 35, which is within the normal range, but she prefers it to be greater than 45. She is currently taking vitamin D 5000 IU daily. She is advised to continue this regimen and consider increasing the dose if future levels are still not optimal.    3. Health Maintenance.  Her vitamin B12 level is satisfactory at approximately 800. Her thyroid function is stable with a TSH of 0.6. Her comprehensive metabolic panel, including sodium, potassium, and creatinine, is within normal limits. Given her age of 34, a mammogram is not necessary at this time. A Helix test will be ordered for her upcoming blood work to check for BRCA, Rios syndrome, and familial hypercholesterolemia. She is encouraged to complete the \"Caring Starts with You\" program and questionnaire.        Assessment & Plan  Iron deficiency    Orders:    Iron Panel (Includes Ferritin, Iron Sat%, Iron, and TIBC)    Well adult exam         Vitamin D deficiency         Generalized anxiety disorder    Orders:    escitalopram (LEXAPRO) 5 mg tablet; Take 1 tablet (5 mg total) by mouth daily         History of Present Illness     History of Present Illness  The patient is a 52-year-old female who presents for evaluation of multiple medical concerns.    She has not yet completed her lab tests due to technical difficulties in accessing the orders on her phone. She " "requests an iron panel to be added to her lab work, as her previous ferritin level was 6. She has increased her iron supplement dosage to 65 mg twice daily, which she tolerates well when taken with food. She also takes vitamin D at a dose of 5000 IU. She reports no issues with her finger pain or De Quervain's syndrome.     Review of Systems   Constitutional:  Negative for fever and unexpected weight change.   HENT:  Negative for nosebleeds and trouble swallowing.    Eyes:  Negative for visual disturbance.   Respiratory:  Negative for chest tightness and shortness of breath.    Cardiovascular:  Negative for chest pain, palpitations and leg swelling.   Gastrointestinal:  Negative for abdominal pain, constipation, diarrhea and nausea.   Endocrine: Negative for cold intolerance.   Genitourinary:  Negative for dysuria and urgency.   Musculoskeletal:  Negative for joint swelling and myalgias.   Skin:  Negative for rash.   Neurological:  Negative for tremors, seizures and syncope.   Hematological:  Does not bruise/bleed easily.   Psychiatric/Behavioral:  Negative for hallucinations and suicidal ideas.      Objective     /74   Pulse 80   Resp 16   Ht 5' 2.5\" (1.588 m)   Wt 61.7 kg (136 lb)   SpO2 98%   BMI 24.48 kg/m²     Physical Exam    Physical Exam  Vitals and nursing note reviewed.   Constitutional:       Appearance: She is well-developed.   HENT:      Head: Normocephalic and atraumatic.      Right Ear: External ear normal.      Left Ear: External ear normal.      Nose: Nose normal.   Eyes:      Conjunctiva/sclera: Conjunctivae normal.      Pupils: Pupils are equal, round, and reactive to light.   Cardiovascular:      Rate and Rhythm: Normal rate and regular rhythm.      Heart sounds: Normal heart sounds. No murmur heard.  Pulmonary:      Effort: Pulmonary effort is normal.      Breath sounds: Normal breath sounds. No wheezing.   Abdominal:      General: Bowel sounds are normal.      Palpations: Abdomen is " soft.   Musculoskeletal:         General: No tenderness. Normal range of motion.      Cervical back: Normal range of motion and neck supple.   Lymphadenopathy:      Cervical: No cervical adenopathy.   Skin:     General: Skin is warm and dry.      Capillary Refill: Capillary refill takes less than 2 seconds.   Neurological:      Mental Status: She is alert and oriented to person, place, and time.   Psychiatric:         Behavior: Behavior normal.         Thought Content: Thought content normal.         Judgment: Judgment normal.

## 2024-08-17 ENCOUNTER — APPOINTMENT (OUTPATIENT)
Dept: LAB | Facility: CLINIC | Age: 34
End: 2024-08-17
Payer: COMMERCIAL

## 2024-08-17 DIAGNOSIS — Z00.6 ENCOUNTER FOR EXAMINATION FOR NORMAL COMPARISON OR CONTROL IN CLINICAL RESEARCH PROGRAM: ICD-10-CM

## 2024-08-17 LAB
25(OH)D3 SERPL-MCNC: 37.5 NG/ML (ref 30–100)
ALBUMIN SERPL BCG-MCNC: 4.2 G/DL (ref 3.5–5)
ALP SERPL-CCNC: 47 U/L (ref 34–104)
ALT SERPL W P-5'-P-CCNC: 9 U/L (ref 7–52)
ANION GAP SERPL CALCULATED.3IONS-SCNC: 4 MMOL/L (ref 4–13)
AST SERPL W P-5'-P-CCNC: 13 U/L (ref 13–39)
BASOPHILS # BLD AUTO: 0.03 THOUSANDS/ÂΜL (ref 0–0.1)
BASOPHILS NFR BLD AUTO: 0 % (ref 0–1)
BILIRUB SERPL-MCNC: 0.48 MG/DL (ref 0.2–1)
BUN SERPL-MCNC: 9 MG/DL (ref 5–25)
CALCIUM SERPL-MCNC: 9.2 MG/DL (ref 8.4–10.2)
CHLORIDE SERPL-SCNC: 103 MMOL/L (ref 96–108)
CHOLEST SERPL-MCNC: 186 MG/DL
CO2 SERPL-SCNC: 29 MMOL/L (ref 21–32)
CREAT SERPL-MCNC: 0.56 MG/DL (ref 0.6–1.3)
EOSINOPHIL # BLD AUTO: 0.09 THOUSAND/ÂΜL (ref 0–0.61)
EOSINOPHIL NFR BLD AUTO: 1 % (ref 0–6)
ERYTHROCYTE [DISTWIDTH] IN BLOOD BY AUTOMATED COUNT: 13.6 % (ref 11.6–15.1)
EST. AVERAGE GLUCOSE BLD GHB EST-MCNC: 105 MG/DL
FERRITIN SERPL-MCNC: 30 NG/ML (ref 11–307)
GFR SERPL CREATININE-BSD FRML MDRD: 120 ML/MIN/1.73SQ M
GLUCOSE P FAST SERPL-MCNC: 90 MG/DL (ref 65–99)
HBA1C MFR BLD: 5.3 %
HCT VFR BLD AUTO: 36.9 % (ref 34.8–46.1)
HDLC SERPL-MCNC: 94 MG/DL
HGB BLD-MCNC: 12 G/DL (ref 11.5–15.4)
IMM GRANULOCYTES # BLD AUTO: 0.02 THOUSAND/UL (ref 0–0.2)
IMM GRANULOCYTES NFR BLD AUTO: 0 % (ref 0–2)
IRON SATN MFR SERPL: 37 % (ref 15–50)
IRON SERPL-MCNC: 119 UG/DL (ref 50–212)
LDLC SERPL CALC-MCNC: 80 MG/DL (ref 0–100)
LYMPHOCYTES # BLD AUTO: 1.67 THOUSANDS/ÂΜL (ref 0.6–4.47)
LYMPHOCYTES NFR BLD AUTO: 24 % (ref 14–44)
MCH RBC QN AUTO: 29.2 PG (ref 26.8–34.3)
MCHC RBC AUTO-ENTMCNC: 32.5 G/DL (ref 31.4–37.4)
MCV RBC AUTO: 90 FL (ref 82–98)
MONOCYTES # BLD AUTO: 0.4 THOUSAND/ÂΜL (ref 0.17–1.22)
MONOCYTES NFR BLD AUTO: 6 % (ref 4–12)
NEUTROPHILS # BLD AUTO: 4.81 THOUSANDS/ÂΜL (ref 1.85–7.62)
NEUTS SEG NFR BLD AUTO: 69 % (ref 43–75)
NRBC BLD AUTO-RTO: 0 /100 WBCS
PLATELET # BLD AUTO: 259 THOUSANDS/UL (ref 149–390)
PMV BLD AUTO: 10.2 FL (ref 8.9–12.7)
POTASSIUM SERPL-SCNC: 4.3 MMOL/L (ref 3.5–5.3)
PROT SERPL-MCNC: 6.8 G/DL (ref 6.4–8.4)
RBC # BLD AUTO: 4.11 MILLION/UL (ref 3.81–5.12)
SODIUM SERPL-SCNC: 136 MMOL/L (ref 135–147)
TIBC SERPL-MCNC: 325 UG/DL (ref 250–450)
TRIGL SERPL-MCNC: 62 MG/DL
TSH SERPL DL<=0.05 MIU/L-ACNC: 2.42 UIU/ML (ref 0.45–4.5)
UIBC SERPL-MCNC: 206 UG/DL (ref 155–355)
VIT B12 SERPL-MCNC: 303 PG/ML (ref 180–914)
WBC # BLD AUTO: 7.02 THOUSAND/UL (ref 4.31–10.16)

## 2024-08-17 PROCEDURE — 82728 ASSAY OF FERRITIN: CPT | Performed by: FAMILY MEDICINE

## 2024-08-17 PROCEDURE — 83540 ASSAY OF IRON: CPT | Performed by: FAMILY MEDICINE

## 2024-08-17 PROCEDURE — 83550 IRON BINDING TEST: CPT | Performed by: FAMILY MEDICINE

## 2024-08-29 LAB
APOB+LDLR+PCSK9 GENE MUT ANL BLD/T: NOT DETECTED
BRCA1+BRCA2 DEL+DUP + FULL MUT ANL BLD/T: NOT DETECTED
MLH1+MSH2+MSH6+PMS2 GN DEL+DUP+FUL M: NOT DETECTED

## 2024-09-18 DIAGNOSIS — F41.1 GENERALIZED ANXIETY DISORDER: ICD-10-CM

## 2024-09-18 RX ORDER — ESCITALOPRAM OXALATE 5 MG/1
5 TABLET ORAL DAILY
Qty: 90 TABLET | Refills: 1 | Status: SHIPPED | OUTPATIENT
Start: 2024-09-18

## 2024-10-30 ENCOUNTER — TELEMEDICINE (OUTPATIENT)
Dept: BEHAVIORAL/MENTAL HEALTH CLINIC | Facility: CLINIC | Age: 34
End: 2024-10-30
Payer: COMMERCIAL

## 2024-10-30 DIAGNOSIS — F41.1 GENERALIZED ANXIETY DISORDER: Primary | ICD-10-CM

## 2024-10-30 PROCEDURE — 90834 PSYTX W PT 45 MINUTES: CPT | Performed by: SOCIAL WORKER

## 2024-10-30 NOTE — BH TREATMENT PLAN
Outpatient Behavioral Health Psychotherapy Treatment Plan    Alondra Bal  1990     Date of Initial Psychotherapy Assessment: 12/11/23   Date of Current Treatment Plan: 10/30/24  Treatment Plan Target Date: 4/30/25  Treatment Plan Expiration Date: 4/30/25    Diagnosis:   1. Generalized anxiety disorder              Area(s) of Need:  I spiral when I overthink about my sister.      Long Term Goal 1 (in the client's own words): I want to establish emotional distance from this so I can keep my peace.     Stage of Change: Action    Target Date for completion: 4/30/25     Anticipated therapeutic modalities: Supportive Psychotherapy      People identified to complete this goal: Alondra Casanova      Objective 1: (identify the means of measuring success in meeting the objective): I will actively practice being aware of how my thoughts negatively impact my mood.       Objective 2: (identify the means of measuring success in meeting the objective): I will utilize therapy sessions to increase my awareness of this process.       I am currently under the care of a [facility] psychiatric provider: no    My [facility] psychiatric provider is:     I am currently taking psychiatric medications: No    I feel that I will be ready for discharge from mental health care when I reach the following (measurable goal/objective): When I am more confident, happier with myself.     For children and adults who have a legal guardian:   Has there been any change to custody orders and/or guardianship status? No. If yes, attach updated documentation.    I have created my Crisis Plan and have been offered a copy of this plan    Behavioral Health Treatment Plan [facility]: Diagnosis and Treatment Plan explained to Alondra Canosskaterina Berman acknowledges an understanding of their diagnosis. Alondra Berman agrees to this treatment plan.    I have been offered a copy of this Treatment Plan. yes

## 2024-10-30 NOTE — PSYCH
"Behavioral Health Psychotherapy Progress Note    Psychotherapy Provided: Individual Psychotherapy     No diagnosis found.      Goals addressed in session: Goal 1     DATA: Alondra spoke with this worker today about her feelings re: the negative impact that seeing an online post from her sister immediately had on her.  She shared details of the negative thought spiral that she found herself in following this and reached out to this worker in order to return to therapy.    During this session, this clinician used the following therapeutic modalities: Supportive Psychotherapy    Substance Abuse was not addressed during this session. If the client is diagnosed with a co-occurring substance use disorder, please indicate any changes in the frequency or amount of use: . Stage of change for addressing substance use diagnoses: No substance use/Not applicable    ASSESSMENT:  Alondra Berman presents with a Euthymic/ normal mood. Alondra is able to recognize how well she has been doing while also being aware that her sister is a trigger to her.  She was very receptive to this worker's feedback and agreed to work on maintaining her peace.     her affect is Normal range and intensity, which is congruent, with her mood and the content of the session. The client has made progress on their goals.     Alondra Berman presents with a none risk of suicide, none risk of self-harm, and none risk of harm to others.    For any risk assessment that surpasses a \"low\" rating, a safety plan must be developed.    A safety plan was indicated: no  If yes, describe in detail     PLAN: Between sessions, Alondra Berman will continue to notice her feelings while observing her thoughts with less judgement. She will return to monthly sessions at this time. Treatment Plan Tracking    # 1Treatment Plan not completed within required time limits due to:  having not been seen for the past 10 months .         Behavioral Health Treatment Plan and Discharge Planning: " Alondra Berman is aware of and agrees to continue to work on their treatment plan. They have identified and are working toward their discharge goals. yes    Visit start and stop times:        10/30/24

## 2024-11-08 DIAGNOSIS — E61.1 IRON DEFICIENCY: Primary | ICD-10-CM

## 2024-11-24 ENCOUNTER — APPOINTMENT (OUTPATIENT)
Dept: LAB | Facility: CLINIC | Age: 34
End: 2024-11-24
Payer: COMMERCIAL

## 2024-11-24 ENCOUNTER — RESULTS FOLLOW-UP (OUTPATIENT)
Dept: FAMILY MEDICINE CLINIC | Facility: CLINIC | Age: 34
End: 2024-11-24

## 2024-11-24 LAB
BASOPHILS # BLD AUTO: 0.04 THOUSANDS/ΜL (ref 0–0.1)
BASOPHILS NFR BLD AUTO: 1 % (ref 0–1)
EOSINOPHIL # BLD AUTO: 0.14 THOUSAND/ΜL (ref 0–0.61)
EOSINOPHIL NFR BLD AUTO: 2 % (ref 0–6)
ERYTHROCYTE [DISTWIDTH] IN BLOOD BY AUTOMATED COUNT: 13.2 % (ref 11.6–15.1)
FERRITIN SERPL-MCNC: 31 NG/ML (ref 11–307)
HCT VFR BLD AUTO: 37.8 % (ref 34.8–46.1)
HGB BLD-MCNC: 12.3 G/DL (ref 11.5–15.4)
IMM GRANULOCYTES # BLD AUTO: 0.02 THOUSAND/UL (ref 0–0.2)
IMM GRANULOCYTES NFR BLD AUTO: 0 % (ref 0–2)
IRON SATN MFR SERPL: 26 % (ref 15–50)
IRON SERPL-MCNC: 91 UG/DL (ref 50–212)
LYMPHOCYTES # BLD AUTO: 1.25 THOUSANDS/ΜL (ref 0.6–4.47)
LYMPHOCYTES NFR BLD AUTO: 17 % (ref 14–44)
MCH RBC QN AUTO: 29.1 PG (ref 26.8–34.3)
MCHC RBC AUTO-ENTMCNC: 32.5 G/DL (ref 31.4–37.4)
MCV RBC AUTO: 90 FL (ref 82–98)
MONOCYTES # BLD AUTO: 0.38 THOUSAND/ΜL (ref 0.17–1.22)
MONOCYTES NFR BLD AUTO: 5 % (ref 4–12)
NEUTROPHILS # BLD AUTO: 5.6 THOUSANDS/ΜL (ref 1.85–7.62)
NEUTS SEG NFR BLD AUTO: 75 % (ref 43–75)
NRBC BLD AUTO-RTO: 0 /100 WBCS
PLATELET # BLD AUTO: 280 THOUSANDS/UL (ref 149–390)
PMV BLD AUTO: 9.7 FL (ref 8.9–12.7)
RBC # BLD AUTO: 4.22 MILLION/UL (ref 3.81–5.12)
TIBC SERPL-MCNC: 352 UG/DL (ref 250–450)
UIBC SERPL-MCNC: 261 UG/DL (ref 155–355)
WBC # BLD AUTO: 7.43 THOUSAND/UL (ref 4.31–10.16)

## 2024-12-02 ENCOUNTER — TELEMEDICINE (OUTPATIENT)
Dept: BEHAVIORAL/MENTAL HEALTH CLINIC | Facility: CLINIC | Age: 34
End: 2024-12-02
Payer: COMMERCIAL

## 2024-12-02 DIAGNOSIS — F41.1 GENERALIZED ANXIETY DISORDER: Primary | ICD-10-CM

## 2024-12-02 PROCEDURE — 90832 PSYTX W PT 30 MINUTES: CPT | Performed by: SOCIAL WORKER

## 2024-12-02 NOTE — PSYCH
"Behavioral Health Psychotherapy Progress Note    Psychotherapy Provided: Individual Psychotherapy     No diagnosis found.      Goals addressed in session: Goal 1     DATA: Alondra spoke with this worker today about her feelings re: the time that she spent with family during the recent holiday and the absence of her sister for the past 3 years.  She verbalized sadness that her son has no relationship with his aunts, but a desire to \"feel her feelings and allowing herself to then 'move on.'\"  Alondra also expressed frustration with her father's negative comments while recognizing her ability to \"let him own them.\"   During this session, this clinician used the following therapeutic modalities: Supportive Psychotherapy    Substance Abuse was not addressed during this session. If the client is diagnosed with a co-occurring substance use disorder, please indicate any changes in the frequency or amount of use: . Stage of change for addressing substance use diagnoses: No substance use/Not applicable    ASSESSMENT:  Alondra Berman presents with a Euthymic/ normal mood. Alondra is able to recognize how well she has been doing while also being aware of her triggers.  She was very receptive to this worker's feedback and agreed to work on \"being a light.\" .     her affect is Normal range and intensity, which is congruent, with her mood and the content of the session. The client has made progress on their goals.     Alondra Berman presents with a none risk of suicide, none risk of self-harm, and none risk of harm to others.    For any risk assessment that surpasses a \"low\" rating, a safety plan must be developed.    A safety plan was indicated: no  If yes, describe in detail     PLAN: Between sessions, Alondra Berman will continue to notice her feelings while observing her thoughts with less judgement. She will return to monthly sessions at this time.       Behavioral Health Treatment Plan and Discharge Planning: Alondra Berman is aware of " and agrees to continue to work on their treatment plan. They have identified and are working toward their discharge goals. yes    Visit start and stop times:        12/02/24     Virtual Regular Visit    Verification of patient location:    Patient is located at Home in the following state in which I hold an active license [place]      Assessment/Plan:    Problem List Items Addressed This Visit          Behavioral Health    Generalized anxiety disorder - Primary       Goals addressed in session: Goal 1          Reason for visit is   Chief Complaint   Patient presents with    Virtual Regular Visit          Encounter provider Jocelyne García      Recent Visits  No visits were found meeting these conditions.  Showing recent visits within past 7 days and meeting all other requirements  Today's Visits  Date Type Provider Dept   12/02/24 Telemedicine Jocelyne García [facility] Therapist [place]   Showing today's visits and meeting all other requirements  Future Appointments  No visits were found meeting these conditions.  Showing future appointments within next 150 days and meeting all other requirements       The patient was identified by name and date of birth. Cesia Berman was informed that this is a telemedicine visit and that the visit is being conducted throughthe Epic Embedded platform. She agrees to proceed..  My office door was closed. No one else was in the room.  She acknowledged consent and understanding of privacy and security of the video platform. The patient has agreed to participate and understands they can discontinue the visit at any time.    Patient is aware this is a billable service.     Subjective  Cesia Berman is a 34 y.o. female  .      HPI     Past Medical History:   Diagnosis Date    39 weeks gestation of pregnancy 07/15/2022    Abnormal glucose tolerance in pregnancy 07/15/2022    Acne     Anemia     Elevated blood pressure reading in office without diagnosis of hypertension  07/15/2022    Initial /88 with manual repeat of 122/66 . Denies HA, visual changes or pain. No prior elevated readings. Has BP cuff at home and will monitor and report any elevated readings 140/90 or above to OB.     Migraine     Status post primary low transverse  section 2022    Varicella        Past Surgical History:   Procedure Laterality Date    NO PAST SURGERIES      [place]  DELIVERY ONLY N/A 2022    Procedure:  SECTION ();  Surgeon: Betsy Navarro MD;  Location: [facility] ;  Service: Obstetrics    WISDOM TOOTH EXTRACTION         Current Outpatient Medications   Medication Sig Dispense Refill    acetaminophen (TYLENOL) 325 mg tablet Take 2 tablets (650 mg total) by mouth every 6 (six) hours  0    Cholecalciferol (D3 5000) 125 MCG (5000 UT) capsule       clobetasol (TEMOVATE) 0.05 % GEL APPLY TO AFFECTED AREA AS DIRECTED      Crisaborole (Eucrisa) 2 % OINT Apply 1 application. topically 2 (two) times a day      Cyanocobalamin (B-12) 1000 MCG CAPS       econazole nitrate 1 % cream APPLY EXTERNALLY TO AFFECTED AREA TWICE DAILY X 10-14 DAYS      escitalopram (LEXAPRO) 5 mg tablet Take 1 tablet (5 mg total) by mouth daily 90 tablet 1    Ferrous Bisglycinate Chelate 28 MG CAPS       hydrocortisone 2.5 % ointment APPLY TO THE AFFECTED AREA(S) BY TOPICAL ROUTE TWICE DAILY FOR 1-2 WEEKS ONLY.      Omega-3 Fatty Acids (Fish Oil) 1200 MG CAPS       spironolactone (ALDACTONE) 50 mg tablet Take 1 tablet by mouth daily      tacrolimus (PROTOPIC) 0.03 % ointment APPLY A THIN LAYER TO THE AFFECTED AREA (FACE/EYES/NOSE/MOUTH) BY TOPICAL ROUTE 2 TIMES PER DAY ; RUB IN GENTLY AND COMPLETELY      tacrolimus (PROTOPIC) 0.1 % ointment APPLY TO EYELIDS TWICE A DAY      triamcinolone (KENALOG) 0.1 % ointment APPLY A THIN LAYER TO THE AFFECTED AREA (BODY) ON BODY BY TOPICAL ROUTE 2 TIMES PER DAY x 3 WEEKS. DO NOT USE ON FACE, UNDERARMS OR GROIN.      valACYclovir (VALTREX) 500 mg tablet TAKE 1  TABLET BY ORAL ROUTE EVERY DAY INCREASE TO 1 TABLET EVERY 12 HOURS FOR 3 DAYS WITH OUTBREAK       No current facility-administered medications for this visit.        No Known Allergies

## 2025-01-13 ENCOUNTER — TELEMEDICINE (OUTPATIENT)
Dept: BEHAVIORAL/MENTAL HEALTH CLINIC | Facility: CLINIC | Age: 35
End: 2025-01-13
Payer: COMMERCIAL

## 2025-01-13 DIAGNOSIS — F41.1 GENERALIZED ANXIETY DISORDER: Primary | ICD-10-CM

## 2025-01-13 PROCEDURE — 90834 PSYTX W PT 45 MINUTES: CPT | Performed by: SOCIAL WORKER

## 2025-01-13 NOTE — PSYCH
"Behavioral Health Psychotherapy Progress Note    Psychotherapy Provided: Individual Psychotherapy     1. Generalized anxiety disorder              Goals addressed in session: Goal 1     DATA: Alondra spoke with this worker today about her feelings re: her time with a friend group all of  descent who are either doctors or  to a doctor.  Mell shared that her efforts to build a friendship have not been reciprocated and this leads her to being judgemental of her potential shortcomings.   During this session, this clinician used the following therapeutic modalities: Supportive Psychotherapy    Substance Abuse was not addressed during this session. If the client is diagnosed with a co-occurring substance use disorder, please indicate any changes in the frequency or amount of use: . Stage of change for addressing substance use diagnoses: No substance use/Not applicable    ASSESSMENT:  Alondra Berman presents with a Euthymic/ normal mood. Alondra remains extremely committed to growing and decreasing how frequently she \"overthinks.\"  She agreed to practice mindful moments and requested meeting monthly.     her affect is Normal range and intensity, which is congruent, with her mood and the content of the session. The client has made progress on their goals.     Alondra Berman presents with a none risk of suicide, none risk of self-harm, and none risk of harm to others.    For any risk assessment that surpasses a \"low\" rating, a safety plan must be developed.    A safety plan was indicated: no  If yes, describe in detail     PLAN: Between sessions, Alondra Berman will continue to notice her feelings while observing her thoughts with less judgement. She will return to monthly sessions at this time.       Behavioral Health Treatment Plan and Discharge Planning: Alondra Berman is aware of and agrees to continue to work on their treatment plan. They have identified and are working toward their discharge goals. yes    Visit start " and stop times:        01/13/25  Start Time: 1300  Stop Time: 1350  Total Visit Time: 50 minutes  Virtual Regular Visit    Verification of patient location:    Patient is located at Home in the following state in which I hold an active license [place]      Assessment/Plan:    Problem List Items Addressed This Visit        Behavioral Health    Generalized anxiety disorder - Primary           Goals addressed in session: Goal 1          Reason for visit is No chief complaint on file.           Encounter provider Jocelyne García      Recent Visits  No visits were found meeting these conditions.  Showing recent visits within past 7 days and meeting all other requirements  Today's Visits  Date Type Provider Dept   01/13/25 Telemedicine Jocelyne García [facility] Therapist [place]   Showing today's visits and meeting all other requirements  Future Appointments  No visits were found meeting these conditions.  Showing future appointments within next 150 days and meeting all other requirements       The patient was identified by name and date of birth. Cesia Berman was informed that this is a telemedicine visit and that the visit is being conducted throughthe Epic Embedded platform. She agrees to proceed..  My office door was closed. No one else was in the room.  She acknowledged consent and understanding of privacy and security of the video platform. The patient has agreed to participate and understands they can discontinue the visit at any time.    Patient is aware this is a billable service.     Subjective  Cesia Berman is a 34 y.o. female  .      HPI     Past Medical History:   Diagnosis Date   • 39 weeks gestation of pregnancy 07/15/2022   • Abnormal glucose tolerance in pregnancy 07/15/2022   • Acne    • Anemia    • Elevated blood pressure reading in office without diagnosis of hypertension 07/15/2022    Initial /88 with manual repeat of 122/66 . Denies HA, visual changes or pain. No prior elevated  readings. Has BP cuff at home and will monitor and report any elevated readings 140/90 or above to OB.    • Migraine    • Status post primary low transverse  section 2022   • Varicella        Past Surgical History:   Procedure Laterality Date   • NO PAST SURGERIES     • [place]  DELIVERY ONLY N/A 2022    Procedure:  SECTION ();  Surgeon: Betsy Navarro MD;  Location: [facility] ;  Service: Obstetrics   • WISDOM TOOTH EXTRACTION         Current Outpatient Medications   Medication Sig Dispense Refill   • acetaminophen (TYLENOL) 325 mg tablet Take 2 tablets (650 mg total) by mouth every 6 (six) hours  0   • Cholecalciferol (D3 5000) 125 MCG (5000 UT) capsule      • clobetasol (TEMOVATE) 0.05 % GEL APPLY TO AFFECTED AREA AS DIRECTED     • Crisaborole (Eucrisa) 2 % OINT Apply 1 application. topically 2 (two) times a day     • Cyanocobalamin (B-12) 1000 MCG CAPS      • econazole nitrate 1 % cream APPLY EXTERNALLY TO AFFECTED AREA TWICE DAILY X 10-14 DAYS     • escitalopram (LEXAPRO) 5 mg tablet Take 1 tablet (5 mg total) by mouth daily 90 tablet 1   • Ferrous Bisglycinate Chelate 28 MG CAPS      • hydrocortisone 2.5 % ointment APPLY TO THE AFFECTED AREA(S) BY TOPICAL ROUTE TWICE DAILY FOR 1-2 WEEKS ONLY.     • Omega-3 Fatty Acids (Fish Oil) 1200 MG CAPS      • spironolactone (ALDACTONE) 50 mg tablet Take 1 tablet by mouth daily     • tacrolimus (PROTOPIC) 0.03 % ointment APPLY A THIN LAYER TO THE AFFECTED AREA (FACE/EYES/NOSE/MOUTH) BY TOPICAL ROUTE 2 TIMES PER DAY ; RUB IN GENTLY AND COMPLETELY     • tacrolimus (PROTOPIC) 0.1 % ointment APPLY TO EYELIDS TWICE A DAY     • triamcinolone (KENALOG) 0.1 % ointment APPLY A THIN LAYER TO THE AFFECTED AREA (BODY) ON BODY BY TOPICAL ROUTE 2 TIMES PER DAY x 3 WEEKS. DO NOT USE ON FACE, UNDERARMS OR GROIN.     • valACYclovir (VALTREX) 500 mg tablet TAKE 1 TABLET BY ORAL ROUTE EVERY DAY INCREASE TO 1 TABLET EVERY 12 HOURS FOR 3 DAYS WITH OUTBREAK        No current facility-administered medications for this visit.        No Known Allergies

## 2025-04-22 ENCOUNTER — OFFICE VISIT (OUTPATIENT)
Dept: ENDOCRINOLOGY | Facility: CLINIC | Age: 35
End: 2025-04-22
Payer: COMMERCIAL

## 2025-04-22 VITALS
WEIGHT: 140 LBS | HEART RATE: 78 BPM | DIASTOLIC BLOOD PRESSURE: 72 MMHG | BODY MASS INDEX: 25.76 KG/M2 | HEIGHT: 62 IN | SYSTOLIC BLOOD PRESSURE: 122 MMHG

## 2025-04-22 DIAGNOSIS — E61.1 IRON DEFICIENCY: ICD-10-CM

## 2025-04-22 DIAGNOSIS — E55.9 VITAMIN D DEFICIENCY: ICD-10-CM

## 2025-04-22 DIAGNOSIS — R53.82 CHRONIC FATIGUE: Primary | ICD-10-CM

## 2025-04-22 PROCEDURE — 99244 OFF/OP CNSLTJ NEW/EST MOD 40: CPT | Performed by: INTERNAL MEDICINE

## 2025-04-22 NOTE — ASSESSMENT & PLAN NOTE
Check CBC and iron panel.  I suspect she will need iron infusions which we can order once we have results from her laboratory testing.  Orders:    Iron Panel (Includes Ferritin, Iron Sat%, Iron, and TIBC); Future    CBC and differential; Future    Celiac Disease Comprehensive Panel; Future

## 2025-04-22 NOTE — PROGRESS NOTES
Name: Cesia Berman      : 1990      MRN: [id number]  Encounter Provider: Sven Su MD  Encounter Date: 2025   Encounter department: [facility] DIABETES [facility] ENDOCRINOLOGY [place]  :  Assessment & Plan  Vitamin D deficiency  Continue vitamin D supplementation.       Chronic fatigue  Her fatigue may be attributed to deficiencies in vitamin D and iron, which are known to cause such symptoms. Additionally, sleep disturbances and potential malabsorption issues could be contributing factors. A celiac panel will be ordered to investigate the possibility of celiac disease. A complete blood count (CBC) and iron panel will also be conducted to assess her current status. If her symptoms persist despite addressing the iron deficiency, a sleep evaluation may be considered.  Orders:    Iron Panel (Includes Ferritin, Iron Sat%, Iron, and TIBC); Future    CBC and differential; Future    Celiac Disease Comprehensive Panel; Future    Iron deficiency  Check CBC and iron panel.  I suspect she will need iron infusions which we can order once we have results from her laboratory testing.  Orders:    Iron Panel (Includes Ferritin, Iron Sat%, Iron, and TIBC); Future    CBC and differential; Future    Celiac Disease Comprehensive Panel; Future            History of Present Illness   HPI  History of Present Illness  The patient is a 35-year-old female who presents for evaluation of fatigue.    She reports experiencing fluctuations in her energy levels, with a noticeable improvement on days when she adheres to her supplement regimen. However, she acknowledges occasional lapses in this routine, which result in significant fatigue. To maintain functionality, she relies on the consumption of two cups of coffee daily. Her sleep pattern is generally satisfactory, although she experiences interruptions due to her child and pets. She typically retires between 8 PM and 10 PM, depending on the day of the week, and  wakes up feeling refreshed. Despite this, she often feels fatigued by the afternoon, necessitating an additional cup of coffee around 1 PM or 2 PM. This pattern persists even with consistent supplementation. She is currently menstruating and recalls feeling particularly fatigued in the days preceding her cycle, despite maintaining her supplement intake. She does not engage in leg tapping but admits to tossing and turning during sleep. She is currently on a daily dose of 10,000 IU of vitamin D and takes oral B12 supplements. She has no history of head trauma or car accidents. She occasionally snores, as reported by her , but does not experience morning headaches. She also reports no excessive daytime sleepiness while driving, reading, or watching television. She suspects a potential malabsorption issue, given her low iron and vitamin D levels despite high-dose supplementation. She also reports a sensitivity to dairy products and questions the possibility of an underlying gastrointestinal condition.    Her menstrual cycles are regular, although she has observed an increase in both the duration and heaviness of her periods over the past few months.    ALLERGIES  The patient has a DAIRY intolerance or sensitivity.    MEDICATIONS  Current: Vitamin D 10,000 IU daily, B12 oral tablets        Review of Systems   Constitutional:  Positive for fatigue. Negative for chills and fever.   Respiratory:  Negative for shortness of breath.    Cardiovascular:  Negative for chest pain.   Gastrointestinal:  Negative for constipation, diarrhea, nausea and vomiting.   All other systems reviewed and are negative.    Current Outpatient Medications on File Prior to Visit   Medication Sig Dispense Refill    acetaminophen (TYLENOL) 325 mg tablet Take 2 tablets (650 mg total) by mouth every 6 (six) hours  0    Cholecalciferol (D3 5000) 125 MCG (5000 UT) capsule       clobetasol (TEMOVATE) 0.05 % GEL APPLY TO AFFECTED AREA AS DIRECTED       Crisaborole (Eucrisa) 2 % OINT Apply 1 application. topically 2 (two) times a day      Cyanocobalamin (B-12) 1000 MCG CAPS       econazole nitrate 1 % cream APPLY EXTERNALLY TO AFFECTED AREA TWICE DAILY X 10-14 DAYS      escitalopram (LEXAPRO) 5 mg tablet Take 1 tablet (5 mg total) by mouth daily 90 tablet 1    Ferrous Bisglycinate Chelate 28 MG CAPS       hydrocortisone 2.5 % ointment APPLY TO THE AFFECTED AREA(S) BY TOPICAL ROUTE TWICE DAILY FOR 1-2 WEEKS ONLY.      Omega-3 Fatty Acids (Fish Oil) 1200 MG CAPS       spironolactone (ALDACTONE) 50 mg tablet Take 1 tablet by mouth daily      tacrolimus (PROTOPIC) 0.03 % ointment APPLY A THIN LAYER TO THE AFFECTED AREA (FACE/EYES/NOSE/MOUTH) BY TOPICAL ROUTE 2 TIMES PER DAY ; RUB IN GENTLY AND COMPLETELY      tacrolimus (PROTOPIC) 0.1 % ointment APPLY TO EYELIDS TWICE A DAY      triamcinolone (KENALOG) 0.1 % ointment APPLY A THIN LAYER TO THE AFFECTED AREA (BODY) ON BODY BY TOPICAL ROUTE 2 TIMES PER DAY x 3 WEEKS. DO NOT USE ON FACE, UNDERARMS OR GROIN.      valACYclovir (VALTREX) 500 mg tablet TAKE 1 TABLET BY ORAL ROUTE EVERY DAY INCREASE TO 1 TABLET EVERY 12 HOURS FOR 3 DAYS WITH OUTBREAK       No current facility-administered medications on file prior to visit.         Objective   There were no vitals taken for this visit.     Physical Exam  Vitals and nursing note reviewed.   Constitutional:       Appearance: Normal appearance.   HENT:      Head: Normocephalic and atraumatic.   Eyes:      General: No scleral icterus.        Right eye: No discharge.         Left eye: No discharge.   Pulmonary:      Effort: Pulmonary effort is normal.   Musculoskeletal:         General: Normal range of motion.      Cervical back: Normal range of motion.   Skin:     Coloration: Skin is not jaundiced.   Neurological:      General: No focal deficit present.      Mental Status: She is alert and oriented to person, place, and time.   Psychiatric:         Mood and Affect: Mood normal.          Behavior: Behavior normal.

## 2025-04-29 ENCOUNTER — APPOINTMENT (OUTPATIENT)
Dept: LAB | Facility: CLINIC | Age: 35
End: 2025-04-29
Payer: COMMERCIAL

## 2025-04-29 DIAGNOSIS — E61.1 IRON DEFICIENCY: ICD-10-CM

## 2025-04-29 DIAGNOSIS — R53.82 CHRONIC FATIGUE: ICD-10-CM

## 2025-04-29 LAB
BASOPHILS # BLD AUTO: 0.04 THOUSANDS/ÂΜL (ref 0–0.1)
BASOPHILS NFR BLD AUTO: 1 % (ref 0–1)
EOSINOPHIL # BLD AUTO: 0.18 THOUSAND/ÂΜL (ref 0–0.61)
EOSINOPHIL NFR BLD AUTO: 3 % (ref 0–6)
ERYTHROCYTE [DISTWIDTH] IN BLOOD BY AUTOMATED COUNT: 12.8 % (ref 11.6–15.1)
FERRITIN SERPL-MCNC: 21 NG/ML (ref 30–307)
HCT VFR BLD AUTO: 38.8 % (ref 34.8–46.1)
HGB BLD-MCNC: 12.6 G/DL (ref 11.5–15.4)
IGA SERPL-MCNC: 139 MG/DL (ref 66–433)
IMM GRANULOCYTES # BLD AUTO: 0.02 THOUSAND/UL (ref 0–0.2)
IMM GRANULOCYTES NFR BLD AUTO: 0 % (ref 0–2)
IRON SATN MFR SERPL: 10 % (ref 15–50)
IRON SERPL-MCNC: 38 UG/DL (ref 50–212)
LYMPHOCYTES # BLD AUTO: 1.46 THOUSANDS/ÂΜL (ref 0.6–4.47)
LYMPHOCYTES NFR BLD AUTO: 23 % (ref 14–44)
MCH RBC QN AUTO: 29.9 PG (ref 26.8–34.3)
MCHC RBC AUTO-ENTMCNC: 32.5 G/DL (ref 31.4–37.4)
MCV RBC AUTO: 92 FL (ref 82–98)
MONOCYTES # BLD AUTO: 0.31 THOUSAND/ÂΜL (ref 0.17–1.22)
MONOCYTES NFR BLD AUTO: 5 % (ref 4–12)
NEUTROPHILS # BLD AUTO: 4.4 THOUSANDS/ÂΜL (ref 1.85–7.62)
NEUTS SEG NFR BLD AUTO: 68 % (ref 43–75)
NRBC BLD AUTO-RTO: 0 /100 WBCS
PLATELET # BLD AUTO: 303 THOUSANDS/UL (ref 149–390)
PMV BLD AUTO: 10.5 FL (ref 8.9–12.7)
RBC # BLD AUTO: 4.21 MILLION/UL (ref 3.81–5.12)
TIBC SERPL-MCNC: 366.8 UG/DL (ref 250–450)
TRANSFERRIN SERPL-MCNC: 262 MG/DL (ref 203–362)
UIBC SERPL-MCNC: 329 UG/DL (ref 155–355)
WBC # BLD AUTO: 6.41 THOUSAND/UL (ref 4.31–10.16)

## 2025-04-29 PROCEDURE — 85025 COMPLETE CBC W/AUTO DIFF WBC: CPT

## 2025-04-29 PROCEDURE — 86364 TISS TRNSGLTMNASE EA IG CLAS: CPT

## 2025-04-29 PROCEDURE — 83540 ASSAY OF IRON: CPT

## 2025-04-29 PROCEDURE — 83550 IRON BINDING TEST: CPT

## 2025-04-29 PROCEDURE — 82784 ASSAY IGA/IGD/IGG/IGM EACH: CPT

## 2025-04-29 PROCEDURE — 82728 ASSAY OF FERRITIN: CPT

## 2025-04-29 PROCEDURE — 36415 COLL VENOUS BLD VENIPUNCTURE: CPT

## 2025-04-30 ENCOUNTER — TELEMEDICINE (OUTPATIENT)
Dept: BEHAVIORAL/MENTAL HEALTH CLINIC | Facility: CLINIC | Age: 35
End: 2025-04-30
Payer: COMMERCIAL

## 2025-04-30 ENCOUNTER — RESULTS FOLLOW-UP (OUTPATIENT)
Dept: OTHER | Facility: HOSPITAL | Age: 35
End: 2025-04-30

## 2025-04-30 DIAGNOSIS — E61.1 IRON DEFICIENCY: Primary | ICD-10-CM

## 2025-04-30 DIAGNOSIS — F41.1 GENERALIZED ANXIETY DISORDER: Primary | ICD-10-CM

## 2025-04-30 PROCEDURE — 90832 PSYTX W PT 30 MINUTES: CPT | Performed by: SOCIAL WORKER

## 2025-04-30 RX ORDER — SODIUM CHLORIDE 9 MG/ML
20 INJECTION, SOLUTION INTRAVENOUS ONCE
OUTPATIENT
Start: 2025-05-12

## 2025-04-30 NOTE — PSYCH
"Behavioral Health Psychotherapy Progress Note    Psychotherapy Provided: Individual Psychotherapy     1. Generalized anxiety disorder                Goals addressed in session: Goal 1     DATA: Alondra spoke with this worker today about her conflicted feelings re: expanding their family.  She shared how physically ill her  becomes when she expresses her thoughts about having another child while also admitting that she herself is uncertain about doing so.  However, Alondra also verbalized her belief that \"society 'expects' that people have multiple children. \"    During this session, this clinician used the following therapeutic modalities: Supportive Psychotherapy    Substance Abuse was not addressed during this session. If the client is diagnosed with a co-occurring substance use disorder, please indicate any changes in the frequency or amount of use: . Stage of change for addressing substance use diagnoses: No substance use/Not applicable    ASSESSMENT:  Alondra Berman presents with a Anxious mood. Alondra remains extremely committed to growing and decreasing how frequently she \"overthinks.\"  She agreed to continue to both allow her thoughts and feelings to be present while also allowing them to pass.  her affect is Normal range and intensity, which is congruent, with her mood and the content of the session. The client has made progress on their goals.     Alondra Berman presents with a none risk of suicide, none risk of self-harm, and none risk of harm to others.    For any risk assessment that surpasses a \"low\" rating, a safety plan must be developed.    A safety plan was indicated: no  If yes, describe in detail     PLAN: Between sessions, Alondra Berman will continue to notice her feelings while observing her thoughts with less judgement. She will return to monthly sessions at this time.       Behavioral Health Treatment Plan and Discharge Planning: Alondra Berman is aware of and agrees to continue to work on their " treatment plan. They have identified and are working toward their discharge goals. yes    Visit start and stop times:        04/30/25  Start Time: 0800  Stop Time: 0830  Total Visit Time: 30 minutes  Virtual Regular Visit    Verification of patient location:    Patient is located at Home in the following state in which I hold an active license [place]      Assessment/Plan:    Problem List Items Addressed This Visit        Behavioral Health    Generalized anxiety disorder - Primary             Goals addressed in session: Goal 1          Reason for visit is No chief complaint on file.           Encounter provider Jocelyne García      Recent Visits  No visits were found meeting these conditions.  Showing recent visits within past 7 days and meeting all other requirements  Today's Visits  Date Type Provider Dept   04/30/25 Telemedicine Jocelyne García [facility] Therapist [place]   Showing today's visits and meeting all other requirements  Future Appointments  No visits were found meeting these conditions.  Showing future appointments within next 150 days and meeting all other requirements       The patient was identified by name and date of birth. Cesia Berman was informed that this is a telemedicine visit and that the visit is being conducted throughthe Epic Embedded platform. She agrees to proceed..  My office door was closed. No one else was in the room.  She acknowledged consent and understanding of privacy and security of the video platform. The patient has agreed to participate and understands they can discontinue the visit at any time.    Patient is aware this is a billable service.     Subjective  Cesia Berman is a 35 y.o. female  .      HPI     Past Medical History:   Diagnosis Date   • 39 weeks gestation of pregnancy 07/15/2022   • Abnormal glucose tolerance in pregnancy 07/15/2022   • Acne    • Anemia    • Elevated blood pressure reading in office without diagnosis of hypertension 07/15/2022     Initial /88 with manual repeat of 122/66 . Denies HA, visual changes or pain. No prior elevated readings. Has BP cuff at home and will monitor and report any elevated readings 140/90 or above to OB.    • Migraine    • Status post primary low transverse  section 2022   • Varicella        Past Surgical History:   Procedure Laterality Date   • NO PAST SURGERIES     • AL  DELIVERY ONLY N/A 2022    Procedure:  SECTION ();  Surgeon: Betsy Navarro MD;  Location: [facility] ;  Service: Obstetrics   • WISDOM TOOTH EXTRACTION         Current Outpatient Medications   Medication Sig Dispense Refill   • acetaminophen (TYLENOL) 325 mg tablet Take 2 tablets (650 mg total) by mouth every 6 (six) hours  0   • Cholecalciferol (D3 5000) 125 MCG (5000 UT) capsule      • clobetasol (TEMOVATE) 0.05 % GEL APPLY TO AFFECTED AREA AS DIRECTED     • Crisaborole (Eucrisa) 2 % OINT Apply 1 application. topically 2 (two) times a day     • Cyanocobalamin (B-12) 1000 MCG CAPS      • econazole nitrate 1 % cream APPLY EXTERNALLY TO AFFECTED AREA TWICE DAILY X 10-14 DAYS     • escitalopram (LEXAPRO) 5 mg tablet Take 1 tablet (5 mg total) by mouth daily 90 tablet 1   • Ferrous Bisglycinate Chelate 28 MG CAPS      • hydrocortisone 2.5 % ointment APPLY TO THE AFFECTED AREA(S) BY TOPICAL ROUTE TWICE DAILY FOR 1-2 WEEKS ONLY.     • Omega-3 Fatty Acids (Fish Oil) 1200 MG CAPS      • spironolactone (ALDACTONE) 50 mg tablet Take 1 tablet by mouth daily     • tacrolimus (PROTOPIC) 0.03 % ointment APPLY A THIN LAYER TO THE AFFECTED AREA (FACE/EYES/NOSE/MOUTH) BY TOPICAL ROUTE 2 TIMES PER DAY ; RUB IN GENTLY AND COMPLETELY     • tacrolimus (PROTOPIC) 0.1 % ointment APPLY TO EYELIDS TWICE A DAY     • triamcinolone (KENALOG) 0.1 % ointment APPLY A THIN LAYER TO THE AFFECTED AREA (BODY) ON BODY BY TOPICAL ROUTE 2 TIMES PER DAY x 3 WEEKS. DO NOT USE ON FACE, UNDERARMS OR GROIN.     • valACYclovir (VALTREX) 500 mg tablet  TAKE 1 TABLET BY ORAL ROUTE EVERY DAY INCREASE TO 1 TABLET EVERY 12 HOURS FOR 3 DAYS WITH OUTBREAK       No current facility-administered medications for this visit.        No Known Allergies

## 2025-04-30 NOTE — BH TREATMENT PLAN
"Outpatient Behavioral Health Psychotherapy Treatment Plan    Alondra Cullen  1990     Date of Initial Psychotherapy Assessment: 12/11/23   Date of Current Treatment Plan: 04/30/25  Treatment Plan Target Date: 4/30/25  Treatment Plan Expiration Date: 4/30/25    Diagnosis:   1. Generalized anxiety disorder                Area(s) of Need:  I spiral when I overthink.      Long Term Goal 1 (in the client's own words): I want to establish emotional distance from my thoughts so that I can \"keep my peace.\"      Stage of Change: Action    Target Date for completion: 4/30/25     Anticipated therapeutic modalities: Supportive Psychotherapy      People identified to complete this goal: Alondra Casanova      Objective 1: (identify the means of measuring success in meeting the objective): I will actively practice being aware of how my thoughts negatively impact my mood.       Objective 2: (identify the means of measuring success in meeting the objective): I will utilize therapy sessions to increase my awareness of this process.       I am currently under the care of a [facility] psychiatric provider: no    My [facility] psychiatric provider is:     I am currently taking psychiatric medications: No    I feel that I will be ready for discharge from mental health care when I reach the following (measurable goal/objective): When I am more confident, happier with myself.     For children and adults who have a legal guardian:   Has there been any change to custody orders and/or guardianship status? No. If yes, attach updated documentation.    I have created my Crisis Plan and have been offered a copy of this plan    Behavioral Health Treatment Plan [facility]: Diagnosis and Treatment Plan explained to Alondrakaterina Canossica Cullen acknowledges an understanding of their diagnosis. Alondra Bal agrees to this treatment plan.    I have been offered a copy of this Treatment Plan. yes        "

## 2025-04-30 NOTE — BH CRISIS PLAN
Client Name: Alondra Berman       Client YOB: 1990  : 1990    Treatment Team (include name and contact information):     Psychotherapist: Jocelyne García LCSW    Healthcare Provider  Sanford Matias MD   [address] [address]  [phone]    Type of Plan   * Child plans (children 14 yo and younger) must be completed and signed by the child's legal guardian   * Plans for all individuals 13 yo and above must be signed by the client.     Plan Type: adolescent/adult (14 and over) Initial      My Personal Strengths are (in the client's own words):empathy, organization, curiosity    The stressors and triggers that may put me at risk are:  Being treated poorly by others, angry patients, not having enough support.     Coping skills I can use to keep myself calm and safe: observe negative thoughts, be kind to myself, take deep breaths, practice gratitude    Coping skills/supports I can use to maintain abstinence from substance use:   Not Applicable    The people that provide me with help and support: (Include name, contact, and how they can help)   Support person #1: Dejon    * Phone number: in cell phone    * How can they help me? Listening,      Support person #2:Jocelyne    * Phone number: in cell phone    * How can they help me? reinforcing the skills I am learning       In the past, the following has helped me in times of crisis:          If it is an emergency and you need immediate help, call     If there is a possibility of danger to yourself or others, call the following crisis hotline resources:     Adult Crisis Numbers  Suicide Prevention Hotline - Dial   [facility]: [phone]  [facility]: [phone]  [facility]: [phone]  [facility]: [phone]  [place]: [phone]  [facility]: [phone]  [facility]: [phone]  [place] [facility]: [phone] (daytime).       [phone] (after  hours, weekends, holidays)     Child/Adolescent Crisis Numbers   [facility]: [phone]   [facility]: [phone]   [place]: [phone]   [place]: [phone]    Please note: Some [facility] do not have a separate number for Child/Adolescent specific crisis. If your county is not listed under Child/Adolescent, please call the adult number for your county     National Talk to Text Line   All Ages - [phone]    In the event your feelings become unmanageable, and you cannot reach your support system, you will call 911 immediately or go to the nearest hospital emergency room.

## 2025-04-30 NOTE — RESULT ENCOUNTER NOTE
The ferritin level has decreased despite being on oral iron supplements for at least 6 months.  We will put in order for iron infusion.

## 2025-05-01 NOTE — TELEPHONE ENCOUNTER
Called [facility]    Scheduled Iron Infusion (these are 2 hr appt)      Date  Wednesday 5-21-25   12:30 pm     Date Wednesday 5-28-25  1:30 pm     Date Wednesday 6-4-25  Time 1:30 pm     Advise PT of these Time, date and if needs to change ok to call [facility] [phone]

## 2025-05-01 NOTE — TELEPHONE ENCOUNTER
----- Message from Sven Su MD sent at 4/30/2025  5:30 PM EDT -----  The ferritin level has decreased despite being on oral iron supplements for at least 6 months.  We will put in order for iron infusion.

## 2025-05-02 LAB — TTG IGA SER IA-ACNC: <0.4 U/ML (ref ?–10)

## 2025-05-19 ENCOUNTER — TELEPHONE (OUTPATIENT)
Dept: INFUSION CENTER | Facility: CLINIC | Age: 35
End: 2025-05-19

## 2025-05-19 NOTE — TELEPHONE ENCOUNTER
New patient call done, no answer but message left regarding date and time of appt, location of center as well as visitor policy.

## 2025-05-20 DIAGNOSIS — E61.1 IRON DEFICIENCY: Primary | ICD-10-CM

## 2025-05-20 RX ORDER — SODIUM CHLORIDE 9 MG/ML
20 INJECTION, SOLUTION INTRAVENOUS ONCE
Status: CANCELLED | OUTPATIENT
Start: 2025-05-21

## 2025-05-21 ENCOUNTER — EVALUATION (OUTPATIENT)
Dept: PHYSICAL THERAPY | Facility: CLINIC | Age: 35
End: 2025-05-21
Payer: COMMERCIAL

## 2025-05-21 ENCOUNTER — HOSPITAL ENCOUNTER (OUTPATIENT)
Dept: INFUSION CENTER | Facility: CLINIC | Age: 35
Discharge: HOME | End: 2025-05-21
Attending: INTERNAL MEDICINE
Payer: COMMERCIAL

## 2025-05-21 VITALS
OXYGEN SATURATION: 98 % | DIASTOLIC BLOOD PRESSURE: 77 MMHG | RESPIRATION RATE: 18 BRPM | HEART RATE: 84 BPM | TEMPERATURE: 97 F | SYSTOLIC BLOOD PRESSURE: 111 MMHG

## 2025-05-21 DIAGNOSIS — G89.29 CHRONIC PAIN OF RIGHT KNEE: Primary | ICD-10-CM

## 2025-05-21 DIAGNOSIS — M25.561 CHRONIC PAIN OF RIGHT KNEE: Primary | ICD-10-CM

## 2025-05-21 DIAGNOSIS — E61.1 IRON DEFICIENCY: Primary | ICD-10-CM

## 2025-05-21 PROCEDURE — 97161 PT EVAL LOW COMPLEX 20 MIN: CPT | Performed by: PHYSICAL THERAPIST

## 2025-05-21 PROCEDURE — 97112 NEUROMUSCULAR REEDUCATION: CPT | Performed by: PHYSICAL THERAPIST

## 2025-05-21 RX ORDER — SODIUM CHLORIDE 9 MG/ML
20 INJECTION, SOLUTION INTRAVENOUS ONCE
Status: COMPLETED | OUTPATIENT
Start: 2025-05-21 | End: 2025-05-21

## 2025-05-21 RX ORDER — SODIUM CHLORIDE 9 MG/ML
20 INJECTION, SOLUTION INTRAVENOUS ONCE
OUTPATIENT
Start: 2025-05-30

## 2025-05-21 RX ADMIN — IRON SUCROSE 200 MG: 20 INJECTION, SOLUTION INTRAVENOUS at 12:45

## 2025-05-21 RX ADMIN — SODIUM CHLORIDE 20 ML/HR: 0.9 INJECTION, SOLUTION INTRAVENOUS at 12:47

## 2025-05-21 NOTE — PROGRESS NOTES
Received for venofer. No complaints offered. Tolerated infusion without issue. To return 6/4 at 130pm. AVS declined.

## 2025-05-21 NOTE — PROGRESS NOTES
PT Evaluation     Today's date: 2025  Patient name: Cesia Berman  : 1990  MRN: [id number]  Referring provider: Sanford Matias MD  Dx:   Encounter Diagnosis     ICD-10-CM    1. Chronic pain of right knee  M25.561     G89.29                      Assessment  Impairments: activity intolerance, lacks appropriate home exercise program and pain with function  Symptom irritability: moderate    Assessment details: Cesia Berman is a pleasant 35 y.o. female who presents with chronic R knee pain and chronic neck pain. Neck symptoms were not evaluated today; this will be evaluated at the next visit. Physical exam is consistent with chronic patellar tendinopathy. This diagnosis is primarily based off the subjective history, as there was no provocation of symptoms with the physical exam. Repeated movements testing of the knee was not performed, as there was no comparable sign established with the physical exam. Primary movement impairment diagnosis of L quad tendon loading insufficiency. I discussed exam findings and how this correlates with treatment. I prescribed an HEP to begin progressive tendon loading. I discussed expected symptomatic response. Patient demonstrates appropriate understanding of HEP and POC. I do not believe that an imaging study of the knee is warranted at this time was there are no signs of ligamentous instability, meniscus pathology or fracture.   No further referral appears necessary at this time based upon examination results. Pt. will benefit from skilled PT services to help return patient to status at [facility].             Understanding of Dx/Px/POC: good     Prognosis: good    Goals  Impairment based goals  Patient will achieve 5/5 knee extension MMT.   Patient will achieve 5/5 knee flexion MMT.   Patient will report 0/10 pain at worst.     Function based goals  Patient will be independent in comprehensive HEP upon discharge.  Patient will achieve goal FOTO score upon  "discharge.  Patient will tolerate 2 mile walk without limitation from pain.   Patient will return to exercise without limitation from pain.     Plan  Patient would benefit from: skilled physical therapy    Planned therapy interventions: activity modification, manual therapy, motor coordination training, neuromuscular re-education, patient education, self care, therapeutic activities, therapeutic exercise, graded activity, home exercise program, graded exercise, functional ROM exercises and strengthening    Frequency: 1-2x week  Duration in weeks: 12  Plan of Care expiration date: 8/13/2025  Treatment plan discussed with: patient        Subjective    HPI:   R knee pain began in 2012; provoked by squats/lunges/anterior knee translation. Walking greater than 1-2 miles will provoke symptoms depending on the intensity of the walk. Intermittent episodes of symptoms. When symptoms become provoked she will experience pain for \"weeks to months\". Location of pain is anterior knee during lunge and posterior knee/calf during deep squat position. She has a 2 year old and is struggling with getting up/down from the floor and squatting to lift things from the floor. Rest consistently makes the knee feel better.     She notes that when she had her son the anesthesiologist had \"difficulty\" with getting the injection in correctly, does not know if this is related.     R side neck \"stiffness\" that is intermittent.     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      Pain: 0/10 current; 1/10 worst   Goals: return to exercise, squat without pain    Objective    Functional Tests:  Gait: WNL; no significant compensation     STS/Squat: WNL; no pain    Lunge: WNL; no pain    Step up: WNL; no pain    Knee Passive Range of Motion:   Knee Flexion:     R 0 deg; min discomfort with end range OP  Knee Extension:    R full    Hip Passive Range of Motion: WFL    Knee Clinical Tests:   Anterior Drawer: firm end " feel  Posterior Drawer: firm end feel  Varus stress @ 30 deg: firm end feel  Valgus Stress @ 30 deg: firm end feel  Jackie: neg    Palpation: neg TTP tibial tuberosity, medial joint line, lateral joint line, pes anserine bursa    Muscle Length:   Ely's: neg  90-90 Hamstring: neg    Manual Muscle Testing:   Hip flexion:     R 4/5  Knee extension @ 0 deg:  R 4/5  Knee flexion:     R 4/5; min posterior knee pain  Knee extension @ 90 deg:   R 4/5    Repeated Movements Testing:   Not tested as there was no comparable sign found in physical exam               Insurance ReEval POC expires Auth Status Total   Visits  Start date  Expiration date PT/OT + Visit Limit? Co-Insurance [facility]  8/13/25 N/a N/a N/a N/a N/a No and $20 Co-pay                                                      Pertinent Findings:      POC End Date: 8/13/25                                                                                          Test / Measure  5/21/2025     FOTO (Predicted 75) 61   Knee extension MMT 4/5   Knee flexion MMT 4/5   VAS at worst 1/10       Precautions: n/a      Manuals 5/21                                        Repeated movements exam             Cervical exam             Neuro Re-Ed                                                                                           Education HEP and POC            Ther Ex             LAQ Btb; HEP            Bridge HEP            SLR HEP            Knee ext @ 90 deg ISO HEP            Mini squat             Knee extensor eccentric             Heels elevated squat             Heels elevated split squat progression                                       Ther Activity                                       Gait Training                                       Modalities

## 2025-06-04 ENCOUNTER — OFFICE VISIT (OUTPATIENT)
Dept: PHYSICAL THERAPY | Facility: CLINIC | Age: 35
End: 2025-06-04
Payer: COMMERCIAL

## 2025-06-04 ENCOUNTER — HOSPITAL ENCOUNTER (OUTPATIENT)
Dept: INFUSION CENTER | Facility: CLINIC | Age: 35
Discharge: HOME | End: 2025-06-04
Attending: INTERNAL MEDICINE
Payer: COMMERCIAL

## 2025-06-04 VITALS
SYSTOLIC BLOOD PRESSURE: 104 MMHG | DIASTOLIC BLOOD PRESSURE: 65 MMHG | TEMPERATURE: 98.4 F | HEART RATE: 76 BPM | OXYGEN SATURATION: 99 % | RESPIRATION RATE: 17 BRPM

## 2025-06-04 DIAGNOSIS — M25.561 CHRONIC PAIN OF RIGHT KNEE: Primary | ICD-10-CM

## 2025-06-04 DIAGNOSIS — E61.1 IRON DEFICIENCY: Primary | ICD-10-CM

## 2025-06-04 DIAGNOSIS — G89.29 CHRONIC PAIN OF RIGHT KNEE: Primary | ICD-10-CM

## 2025-06-04 PROCEDURE — 97110 THERAPEUTIC EXERCISES: CPT | Performed by: PHYSICAL THERAPIST

## 2025-06-04 PROCEDURE — 97164 PT RE-EVAL EST PLAN CARE: CPT | Performed by: PHYSICAL THERAPIST

## 2025-06-04 PROCEDURE — 96365 THER/PROPH/DIAG IV INF INIT: CPT

## 2025-06-04 PROCEDURE — 97140 MANUAL THERAPY 1/> REGIONS: CPT | Performed by: PHYSICAL THERAPIST

## 2025-06-04 RX ORDER — SODIUM CHLORIDE 9 MG/ML
20 INJECTION, SOLUTION INTRAVENOUS ONCE
Status: CANCELLED | OUTPATIENT
Start: 2025-06-11

## 2025-06-04 RX ORDER — SODIUM CHLORIDE 9 MG/ML
20 INJECTION, SOLUTION INTRAVENOUS ONCE
Status: COMPLETED | OUTPATIENT
Start: 2025-06-04 | End: 2025-06-04

## 2025-06-04 RX ADMIN — SODIUM CHLORIDE 20 ML/HR: 9 INJECTION, SOLUTION INTRAVENOUS at 13:38

## 2025-06-04 RX ADMIN — IRON SUCROSE 200 MG: 20 INJECTION, SOLUTION INTRAVENOUS at 13:39

## 2025-06-04 NOTE — HOME EXERCISE EDUCATION
Program_ID:[id number]   Access Code: [email or web address]  URL: [email or web address]  Date: 06-  Prepared By: Emmanuel Perez    Program Notes      Exercises      - Seated Thoracic Lumbar Extension - 3 x daily -  x weekly -  sets - 10 reps      - Mid-Lower Cervical Extension SNAG with Strap - 3 x daily -  x weekly -  sets - 10 reps      - Seated Cervical Retraction and Extension - 3 x daily -  x weekly -  sets - 10 reps

## 2025-06-04 NOTE — PROGRESS NOTES
PT Re-Evaluation    Today's date: 2025  Patient name: Cesia Berman  : 1990  MRN: [id number]  Referring provider: Sanford Matias MD  Dx:   Encounter Diagnosis     ICD-10-CM    1. Chronic pain of right knee  M25.561     G89.29                      Subjective: Has been compliant with HEP. No pain. Did more walking on vacatoin, increase in knee pain; then rested and better.     HPI: 10 year hx; neck stiffness/pain especially when looking to the R side neck pain always. Limited R ROT. Not affected by time or activity. She reports that she feels it will be tighter when she is stressed. She will feel some relief in tightness with some stretching and soft tissue massage.    Pain: 2/10 current; 8/10 worst   Goals: decrease stiffness      Objective: See treatment diary below    Upper Quarter Screen  Myotomes:  Strength WNL B/L    Dermatomes:   Sensation WNL B/L    Reflexes:   R Biceps: 2+  L Biceps: 2+  R Brachioradialis: 2+  L Brachioradialis: 2+    Cervical Active Range of Motion  Movement Loss Symptoms Los Mod Min Nil Symptoms   Flexion    x Produce  No Worse   Extension   x  No Effect  No Effect   Retraction   x  Produce  No Worse   R Rotation   x  Produce  No Worse   L Rotation    x Produce  No Worse     R Sidebend    x No Effect  No Effect   L Sidebend   x  Produce  No Worse   Other          Palpation:   Elevated R 1st rib  Mild TTP R upper trap/LS musculature  Hypomobility upper thoracic spine CPA      Assessment: Patient presents for re-evaluation for chronic neck pain/stiffness. Upper quarter neuro screen is negative. Primary movements impairment diagnosis of CTJ and upper thoracic spine extension deficit. Patient demonstrates positive response to manual therapy and exercises to reduce neck pain/stiffness with provocative movements. I prescribed an HEP to include exercises charted below. In addition to this we reviewed appropriate patellar tendon loading. I advised her to increase the intensity of  loading by pushing to the point of producing pain to the intensity of 3-4/10. I advised her that pain should not last longer than 20 min post exercise. I also discussed expected timeline of healing (approx 8 weeks). Patient was receptive to this discussion. Assess response to neck exercises next week.       Plan: Continue per plan of care.      Insurance ReEval POC expires Auth Status Total   Visits  Start date  Expiration date PT/OT + Visit Limit? Co-Insurance [facility]  8/13/25 N/a N/a N/a N/a N/a No and $20 Co-pay                                                      Pertinent Findings:      POC End Date: 8/13/25                                                                                          Test / Measure  5/21/2025 6/4/2025   FOTO (Predicted 75) 61    Knee extension MMT 4/5    Knee flexion MMT 4/5    VAS at worst 1/10    R ROT AROM  Min limitation; P/NW   L SB AROM  Min limitation; P/NW       Precautions: n/a      Manuals 5/21 6/4           Upper thoracic spine EXT SNAG  TB; 5 min                        Repeated movements exam             Cervical exam             Neuro Re-Ed                                                                                           Education HEP and POC HEP updates           Ther Ex             LAQ Btb; HEP            Bridge HEP            SLR HEP            Knee ext @ 90 deg ISO HEP Increase intensity; HEP           Seated thoracic spine EXT  10x; HEP           RET + EXT  10x; HEP           Cervical EXT SNAG  10x; HEP           Mini squat             Knee extensor eccentric             Heels elevated squat             Heels elevated split squat progression                                       Ther Activity                                       Gait Training                                       Modalities

## 2025-06-04 NOTE — HOME EXERCISE EDUCATION
Program_ID:[id number]   Access Code: [email or web address]  URL: [email or web address]  Date: 06-  Prepared By: Emmanuel Perez    Program Notes      Exercises      - Seated Thoracic Lumbar Extension - 3 x daily -  x weekly -  sets - 10 reps      - Mid-Lower Cervical Extension SNAG with Strap - 3 x daily -  x weekly -  sets - 10 reps      - Seated Cervical Retraction and Extension - 3 x daily -  x weekly -  sets - 10 reps

## 2025-06-11 ENCOUNTER — HOSPITAL ENCOUNTER (OUTPATIENT)
Dept: INFUSION CENTER | Facility: CLINIC | Age: 35
Discharge: HOME | End: 2025-06-11
Attending: INTERNAL MEDICINE
Payer: COMMERCIAL

## 2025-06-11 ENCOUNTER — APPOINTMENT (OUTPATIENT)
Dept: PHYSICAL THERAPY | Facility: CLINIC | Age: 35
End: 2025-06-11
Payer: COMMERCIAL

## 2025-06-11 DIAGNOSIS — E61.1 IRON DEFICIENCY: Primary | ICD-10-CM

## 2025-06-11 PROCEDURE — 96365 THER/PROPH/DIAG IV INF INIT: CPT

## 2025-06-11 RX ORDER — SODIUM CHLORIDE 9 MG/ML
20 INJECTION, SOLUTION INTRAVENOUS ONCE
Status: COMPLETED | OUTPATIENT
Start: 2025-06-11 | End: 2025-06-11

## 2025-06-11 RX ORDER — SODIUM CHLORIDE 9 MG/ML
20 INJECTION, SOLUTION INTRAVENOUS ONCE
OUTPATIENT
Start: 2025-06-18

## 2025-06-11 RX ADMIN — SODIUM CHLORIDE 20 ML/HR: 0.9 INJECTION, SOLUTION INTRAVENOUS at 15:22

## 2025-06-11 RX ADMIN — IRON SUCROSE 200 MG: 20 INJECTION, SOLUTION INTRAVENOUS at 15:27

## 2025-06-11 NOTE — PROGRESS NOTES
Tolerated infusion without incident: No adverse reactions noted: Verified follow up appt with patient ( 06/25/25 ): AVS offered and declined

## 2025-06-25 ENCOUNTER — HOSPITAL ENCOUNTER (OUTPATIENT)
Dept: INFUSION CENTER | Facility: CLINIC | Age: 35
Discharge: HOME | End: 2025-06-25
Attending: INTERNAL MEDICINE
Payer: COMMERCIAL

## 2025-06-25 VITALS
SYSTOLIC BLOOD PRESSURE: 113 MMHG | DIASTOLIC BLOOD PRESSURE: 78 MMHG | TEMPERATURE: 98 F | HEART RATE: 83 BPM | RESPIRATION RATE: 16 BRPM

## 2025-06-25 DIAGNOSIS — E61.1 IRON DEFICIENCY: Primary | ICD-10-CM

## 2025-06-25 PROCEDURE — 96365 THER/PROPH/DIAG IV INF INIT: CPT

## 2025-06-25 RX ORDER — SODIUM CHLORIDE 9 MG/ML
20 INJECTION, SOLUTION INTRAVENOUS ONCE
Status: COMPLETED | OUTPATIENT
Start: 2025-06-25 | End: 2025-06-25

## 2025-06-25 RX ORDER — SODIUM CHLORIDE 9 MG/ML
20 INJECTION, SOLUTION INTRAVENOUS ONCE
Status: CANCELLED | OUTPATIENT
Start: 2025-07-02

## 2025-06-25 RX ADMIN — IRON SUCROSE 200 MG: 20 INJECTION, SOLUTION INTRAVENOUS at 13:45

## 2025-06-25 RX ADMIN — SODIUM CHLORIDE 20 ML/HR: 9 INJECTION, SOLUTION INTRAVENOUS at 13:41

## 2025-06-25 NOTE — PROGRESS NOTES
Patient here for Venofer, tolerated infusion without any incidents. Patient is aware of next appointment 7/2 at 1400, declined AVS.

## 2025-07-02 ENCOUNTER — HOSPITAL ENCOUNTER (OUTPATIENT)
Dept: INFUSION CENTER | Facility: CLINIC | Age: 35
Discharge: HOME | End: 2025-07-02
Attending: INTERNAL MEDICINE
Payer: COMMERCIAL

## 2025-07-02 VITALS
HEART RATE: 89 BPM | TEMPERATURE: 97.6 F | DIASTOLIC BLOOD PRESSURE: 72 MMHG | RESPIRATION RATE: 16 BRPM | OXYGEN SATURATION: 97 % | SYSTOLIC BLOOD PRESSURE: 113 MMHG

## 2025-07-02 DIAGNOSIS — E61.1 IRON DEFICIENCY: Primary | ICD-10-CM

## 2025-07-02 PROCEDURE — 96365 THER/PROPH/DIAG IV INF INIT: CPT

## 2025-07-02 RX ORDER — SODIUM CHLORIDE 9 MG/ML
20 INJECTION, SOLUTION INTRAVENOUS ONCE
Status: CANCELLED | OUTPATIENT
Start: 2025-07-10

## 2025-07-02 RX ORDER — SODIUM CHLORIDE 9 MG/ML
20 INJECTION, SOLUTION INTRAVENOUS ONCE
Status: COMPLETED | OUTPATIENT
Start: 2025-07-02 | End: 2025-07-02

## 2025-07-02 RX ADMIN — IRON SUCROSE 200 MG: 20 INJECTION, SOLUTION INTRAVENOUS at 14:10

## 2025-07-02 RX ADMIN — SODIUM CHLORIDE 20 ML/HR: 0.9 INJECTION, SOLUTION INTRAVENOUS at 14:08

## 2025-07-02 NOTE — PROGRESS NOTES
Pt. Tolerated Venofer infusion without incident, AVS declined.  Next appt confirmed for 7/10 @ 1500.

## 2025-07-10 ENCOUNTER — HOSPITAL ENCOUNTER (OUTPATIENT)
Dept: INFUSION CENTER | Facility: CLINIC | Age: 35
Discharge: HOME | End: 2025-07-10
Attending: INTERNAL MEDICINE
Payer: COMMERCIAL

## 2025-07-10 VITALS
RESPIRATION RATE: 18 BRPM | TEMPERATURE: 97.9 F | OXYGEN SATURATION: 99 % | SYSTOLIC BLOOD PRESSURE: 116 MMHG | HEART RATE: 80 BPM | DIASTOLIC BLOOD PRESSURE: 72 MMHG

## 2025-07-10 DIAGNOSIS — E61.1 IRON DEFICIENCY: Primary | ICD-10-CM

## 2025-07-10 PROCEDURE — 96365 THER/PROPH/DIAG IV INF INIT: CPT

## 2025-07-10 RX ORDER — SODIUM CHLORIDE 9 MG/ML
20 INJECTION, SOLUTION INTRAVENOUS ONCE
Status: COMPLETED | OUTPATIENT
Start: 2025-07-10 | End: 2025-07-10

## 2025-07-10 RX ORDER — SODIUM CHLORIDE 9 MG/ML
20 INJECTION, SOLUTION INTRAVENOUS ONCE
Status: CANCELLED | OUTPATIENT
Start: 2025-07-10

## 2025-07-10 RX ADMIN — IRON SUCROSE 200 MG: 20 INJECTION, SOLUTION INTRAVENOUS at 15:22

## 2025-07-10 RX ADMIN — SODIUM CHLORIDE 20 ML/HR: 0.9 INJECTION, SOLUTION INTRAVENOUS at 15:18

## 2025-07-10 NOTE — PROGRESS NOTES
Patient tolerated infusion without complications. Patient aware of no more appointment/orders at the infusion center, declined AVS.

## 2025-07-11 DIAGNOSIS — Z00.00 WELL ADULT EXAM: ICD-10-CM

## 2025-07-11 DIAGNOSIS — F41.1 GENERALIZED ANXIETY DISORDER: ICD-10-CM

## 2025-07-11 DIAGNOSIS — E78.2 MIXED HYPERLIPIDEMIA: Primary | ICD-10-CM

## 2025-07-11 DIAGNOSIS — E53.8 B12 DEFICIENCY: ICD-10-CM

## 2025-07-11 DIAGNOSIS — E61.1 IRON DEFICIENCY: ICD-10-CM

## 2025-07-26 ENCOUNTER — APPOINTMENT (OUTPATIENT)
Dept: LAB | Facility: CLINIC | Age: 35
End: 2025-07-26
Attending: FAMILY MEDICINE
Payer: COMMERCIAL

## 2025-07-26 ENCOUNTER — APPOINTMENT (OUTPATIENT)
Dept: LAB | Facility: CLINIC | Age: 35
End: 2025-07-26
Attending: PREVENTIVE MEDICINE
Payer: COMMERCIAL

## 2025-07-26 DIAGNOSIS — R53.82 CHRONIC FATIGUE: Primary | ICD-10-CM

## 2025-07-26 DIAGNOSIS — Z00.8 HEALTH EXAMINATION IN POPULATION SURVEY: ICD-10-CM

## 2025-07-26 DIAGNOSIS — E61.1 IRON DEFICIENCY: ICD-10-CM

## 2025-07-26 LAB
ALBUMIN SERPL BCG-MCNC: 4.4 G/DL (ref 3.5–5)
ALP SERPL-CCNC: 45 U/L (ref 34–104)
ALT SERPL W P-5'-P-CCNC: 10 U/L (ref 7–52)
ANION GAP SERPL CALCULATED.3IONS-SCNC: 6 MMOL/L (ref 4–13)
AST SERPL W P-5'-P-CCNC: 13 U/L (ref 13–39)
BASOPHILS # BLD AUTO: 0.02 THOUSANDS/ÂΜL (ref 0–0.1)
BASOPHILS NFR BLD AUTO: 0 % (ref 0–1)
BILIRUB SERPL-MCNC: 0.39 MG/DL (ref 0.2–1)
BUN SERPL-MCNC: 11 MG/DL (ref 5–25)
CALCIUM SERPL-MCNC: 9.4 MG/DL (ref 8.4–10.2)
CHLORIDE SERPL-SCNC: 101 MMOL/L (ref 96–108)
CHOLEST SERPL-MCNC: 182 MG/DL (ref ?–200)
CO2 SERPL-SCNC: 29 MMOL/L (ref 21–32)
CREAT SERPL-MCNC: 0.57 MG/DL (ref 0.6–1.3)
EOSINOPHIL # BLD AUTO: 0.09 THOUSAND/ÂΜL (ref 0–0.61)
EOSINOPHIL NFR BLD AUTO: 1 % (ref 0–6)
ERYTHROCYTE [DISTWIDTH] IN BLOOD BY AUTOMATED COUNT: 13.6 % (ref 11.6–15.1)
FERRITIN SERPL-MCNC: 299 NG/ML (ref 30–307)
GFR SERPL CREATININE-BSD FRML MDRD: 118 ML/MIN/1.73SQ M
GLUCOSE P FAST SERPL-MCNC: 94 MG/DL (ref 65–99)
HCT VFR BLD AUTO: 39 % (ref 34.8–46.1)
HDLC SERPL-MCNC: 85 MG/DL
HGB BLD-MCNC: 12.7 G/DL (ref 11.5–15.4)
IGA SERPL-MCNC: 125 MG/DL (ref 66–433)
IMM GRANULOCYTES # BLD AUTO: 0.02 THOUSAND/UL (ref 0–0.2)
IMM GRANULOCYTES NFR BLD AUTO: 0 % (ref 0–2)
IRON SATN MFR SERPL: 37 % (ref 15–50)
IRON SERPL-MCNC: 103 UG/DL (ref 50–212)
LDLC SERPL CALC-MCNC: 84 MG/DL (ref 0–100)
LYMPHOCYTES # BLD AUTO: 1.35 THOUSANDS/ÂΜL (ref 0.6–4.47)
LYMPHOCYTES NFR BLD AUTO: 21 % (ref 14–44)
MAGNESIUM SERPL-MCNC: 1.8 MG/DL (ref 1.9–2.7)
MCH RBC QN AUTO: 29.5 PG (ref 26.8–34.3)
MCHC RBC AUTO-ENTMCNC: 32.6 G/DL (ref 31.4–37.4)
MCV RBC AUTO: 91 FL (ref 82–98)
MONOCYTES # BLD AUTO: 0.36 THOUSAND/ÂΜL (ref 0.17–1.22)
MONOCYTES NFR BLD AUTO: 6 % (ref 4–12)
NEUTROPHILS # BLD AUTO: 4.47 THOUSANDS/ÂΜL (ref 1.85–7.62)
NEUTS SEG NFR BLD AUTO: 72 % (ref 43–75)
NRBC BLD AUTO-RTO: 0 /100 WBCS
PLATELET # BLD AUTO: 259 THOUSANDS/UL (ref 149–390)
PMV BLD AUTO: 9.9 FL (ref 8.9–12.7)
POTASSIUM SERPL-SCNC: 4.1 MMOL/L (ref 3.5–5.3)
PROT SERPL-MCNC: 6.9 G/DL (ref 6.4–8.4)
RBC # BLD AUTO: 4.3 MILLION/UL (ref 3.81–5.12)
SODIUM SERPL-SCNC: 136 MMOL/L (ref 135–147)
TIBC SERPL-MCNC: 278.6 UG/DL (ref 250–450)
TRANSFERRIN SERPL-MCNC: 199 MG/DL (ref 203–362)
TRIGL SERPL-MCNC: 67 MG/DL (ref ?–150)
UIBC SERPL-MCNC: 176 UG/DL (ref 155–355)
VIT B12 SERPL-MCNC: 424 PG/ML (ref 180–914)
WBC # BLD AUTO: 6.31 THOUSAND/UL (ref 4.31–10.16)

## 2025-07-26 PROCEDURE — 83036 HEMOGLOBIN GLYCOSYLATED A1C: CPT

## 2025-07-26 PROCEDURE — 86364 TISS TRNSGLTMNASE EA IG CLAS: CPT

## 2025-07-26 PROCEDURE — 86258 DGP ANTIBODY EACH IG CLASS: CPT

## 2025-07-26 PROCEDURE — 82784 ASSAY IGA/IGD/IGG/IGM EACH: CPT

## 2025-07-27 LAB
EST. AVERAGE GLUCOSE BLD GHB EST-MCNC: 111 MG/DL
HBA1C MFR BLD: 5.5 %

## 2025-07-30 LAB
GLIADIN IGG SER IA-ACNC: <1.4 U/ML (ref ?–10)
GLIADIN PEPTIDE IGA SER IA-ACNC: 0.4 U/ML (ref ?–10)
TTG IGA SER IA-ACNC: 0.4 U/ML (ref ?–10)
TTG IGG SER IA-ACNC: <1.7 U/ML (ref ?–10)

## 2025-08-20 ENCOUNTER — OFFICE VISIT (OUTPATIENT)
Dept: OBGYN CLINIC | Facility: CLINIC | Age: 35
End: 2025-08-20
Payer: COMMERCIAL

## 2025-08-20 VITALS
WEIGHT: 140 LBS | DIASTOLIC BLOOD PRESSURE: 60 MMHG | HEIGHT: 62 IN | BODY MASS INDEX: 25.76 KG/M2 | SYSTOLIC BLOOD PRESSURE: 112 MMHG

## 2025-08-20 DIAGNOSIS — B00.9 HERPES: ICD-10-CM

## 2025-08-20 DIAGNOSIS — Z12.4 SCREENING FOR CERVICAL CANCER: ICD-10-CM

## 2025-08-20 DIAGNOSIS — Z01.419 WOMEN'S ANNUAL ROUTINE GYNECOLOGICAL EXAMINATION: Primary | ICD-10-CM

## 2025-08-20 DIAGNOSIS — N89.8 VAGINAL DRYNESS: ICD-10-CM

## 2025-08-20 PROCEDURE — S0612 ANNUAL GYNECOLOGICAL EXAMINA: HCPCS | Performed by: OBSTETRICS & GYNECOLOGY

## 2025-08-20 PROCEDURE — G0476 HPV COMBO ASSAY CA SCREEN: HCPCS | Performed by: OBSTETRICS & GYNECOLOGY

## 2025-08-20 RX ORDER — ESTRADIOL 0.1 MG/G
0.5 CREAM VAGINAL 3 TIMES WEEKLY
Qty: 42.5 G | Refills: 0 | Status: SHIPPED | OUTPATIENT
Start: 2025-08-20

## 2025-08-20 RX ORDER — ONDANSETRON 4 MG/1
TABLET, FILM COATED ORAL
COMMUNITY
Start: 2025-05-19

## 2025-08-20 RX ORDER — VALACYCLOVIR HYDROCHLORIDE 500 MG/1
500 TABLET, FILM COATED ORAL 2 TIMES DAILY
Qty: 6 TABLET | Refills: 2 | Status: SHIPPED | OUTPATIENT
Start: 2025-08-20 | End: 2025-08-23

## 2025-08-20 RX ORDER — VALACYCLOVIR HYDROCHLORIDE 1 G/1
TABLET, FILM COATED ORAL
COMMUNITY
Start: 2025-07-02

## 2025-08-27 LAB
LAB AP GYN PRIMARY INTERPRETATION: NORMAL
Lab: NORMAL

## (undated) DEVICE — SUT PLAIN 2-0 CTX 27 IN 872H

## (undated) DEVICE — SUT MONOCRYL 3-0 UNDYED KS CS-1 Y523H

## (undated) DEVICE — CHLORAPREP HI-LITE 26ML ORANGE

## (undated) DEVICE — SKIN MARKER DUAL TIP WITH RULER CAP, FLEXIBLE RULER AND LABELS: Brand: DEVON

## (undated) DEVICE — SUT VICRYL 0 CT-1 36 IN J946H

## (undated) DEVICE — 3M™ STERI-STRIP™ REINFORCED ADHESIVE SKIN CLOSURES, R1547, 1/2 IN X 4 IN (12 MM X 100 MM), 6 STRIPS/ENVELOPE: Brand: 3M™ STERI-STRIP™

## (undated) DEVICE — ABDOMINAL PAD: Brand: DERMACEA

## (undated) DEVICE — TELFA NON-ADHERENT ABSORBENT DRESSING: Brand: TELFA

## (undated) DEVICE — TELFA ADHESIVE ISLAND DRESSING: Brand: TELFA

## (undated) DEVICE — PACK C-SECTION PBDS

## (undated) DEVICE — Device

## (undated) DEVICE — GAUZE SPONGES,16 PLY: Brand: CURITY

## (undated) DEVICE — GLOVE SRG BIOGEL ECLIPSE 6.5